# Patient Record
Sex: FEMALE | Race: WHITE | NOT HISPANIC OR LATINO | Employment: OTHER | URBAN - METROPOLITAN AREA
[De-identification: names, ages, dates, MRNs, and addresses within clinical notes are randomized per-mention and may not be internally consistent; named-entity substitution may affect disease eponyms.]

---

## 2017-01-05 ENCOUNTER — HISTORICAL (OUTPATIENT)
Dept: CARDIOLOGY | Facility: CLINIC | Age: 52
End: 2017-01-05

## 2017-02-04 ENCOUNTER — HISTORICAL (OUTPATIENT)
Dept: SLEEP MEDICINE | Facility: HOSPITAL | Age: 52
End: 2017-02-04

## 2017-02-20 ENCOUNTER — HISTORICAL (OUTPATIENT)
Dept: SURGERY | Facility: HOSPITAL | Age: 52
End: 2017-02-20

## 2017-03-15 ENCOUNTER — HISTORICAL (OUTPATIENT)
Dept: ADMINISTRATIVE | Facility: HOSPITAL | Age: 52
End: 2017-03-15

## 2017-03-20 ENCOUNTER — HISTORICAL (OUTPATIENT)
Dept: ADMINISTRATIVE | Facility: HOSPITAL | Age: 52
End: 2017-03-20

## 2017-03-27 ENCOUNTER — HOSPITAL ENCOUNTER (OUTPATIENT)
Dept: MEDSURG UNIT | Facility: HOSPITAL | Age: 52
End: 2017-03-28
Attending: OTOLARYNGOLOGY | Admitting: OTOLARYNGOLOGY

## 2017-04-26 ENCOUNTER — HISTORICAL (OUTPATIENT)
Dept: ADMINISTRATIVE | Facility: HOSPITAL | Age: 52
End: 2017-04-26

## 2017-05-31 ENCOUNTER — HISTORICAL (OUTPATIENT)
Dept: ADMINISTRATIVE | Facility: HOSPITAL | Age: 52
End: 2017-05-31

## 2017-06-16 ENCOUNTER — HISTORICAL (OUTPATIENT)
Dept: ADMINISTRATIVE | Facility: HOSPITAL | Age: 52
End: 2017-06-16

## 2017-06-16 LAB
BUN SERPL-MCNC: 15 MG/DL (ref 7–25)
CALCIUM SERPL-MCNC: 9.8 MG/DL (ref 8.4–10.3)
CHLORIDE SERPL-SCNC: 101 MMOL/L (ref 96–110)
CO2 SERPL-SCNC: 29 MMOL/L (ref 24–32)
CREAT SERPL-MCNC: 0.55 MG/DL (ref 0.7–1.1)
GLUCOSE SERPL-MCNC: 99 MG/DL (ref 65–99)
POTASSIUM SERPL-SCNC: 4.1 MMOL/L (ref 3.6–5.2)
SODIUM SERPL-SCNC: 140 MMOL/L (ref 135–146)
T4 FREE SERPL-MCNC: 0.8 NG/DL (ref 0.6–1.15)
TSH SERPL-ACNC: 5.4 MIU/L (ref 0.5–5)

## 2017-08-02 ENCOUNTER — HISTORICAL (OUTPATIENT)
Dept: ENDOSCOPY | Facility: HOSPITAL | Age: 52
End: 2017-08-02

## 2017-09-01 ENCOUNTER — HISTORICAL (OUTPATIENT)
Dept: INTERNAL MEDICINE | Facility: CLINIC | Age: 52
End: 2017-09-01

## 2017-09-01 LAB
T4 FREE SERPL-MCNC: 0.85 NG/DL (ref 0.76–1.46)
TSH SERPL-ACNC: 4.57 MIU/L (ref 0.36–3.74)

## 2017-11-01 ENCOUNTER — HISTORICAL (OUTPATIENT)
Dept: RADIOLOGY | Facility: HOSPITAL | Age: 52
End: 2017-11-01

## 2018-01-02 ENCOUNTER — HISTORICAL (OUTPATIENT)
Dept: INTERNAL MEDICINE | Facility: CLINIC | Age: 53
End: 2018-01-02

## 2018-01-02 LAB
T4 FREE SERPL-MCNC: 0.97 NG/DL (ref 0.76–1.46)
TSH SERPL-ACNC: 3.92 MIU/L (ref 0.36–3.74)

## 2018-03-14 ENCOUNTER — HISTORICAL (OUTPATIENT)
Dept: RADIOLOGY | Facility: HOSPITAL | Age: 53
End: 2018-03-14

## 2018-04-20 ENCOUNTER — HISTORICAL (OUTPATIENT)
Dept: ADMINISTRATIVE | Facility: HOSPITAL | Age: 53
End: 2018-04-20

## 2018-04-20 LAB
ALBUMIN SERPL-MCNC: 3.9 GM/DL (ref 3.4–5)
ALBUMIN/GLOB SERPL: 1 RATIO (ref 1–2)
ALP SERPL-CCNC: 86 UNIT/L (ref 45–117)
ALT SERPL-CCNC: 34 UNIT/L (ref 12–78)
AST SERPL-CCNC: 37 UNIT/L (ref 15–37)
BILIRUB SERPL-MCNC: 0.5 MG/DL (ref 0.2–1)
BILIRUBIN DIRECT+TOT PNL SERPL-MCNC: 0.2 MG/DL
BILIRUBIN DIRECT+TOT PNL SERPL-MCNC: 0.3 MG/DL
BUN SERPL-MCNC: 10 MG/DL (ref 7–18)
CALCIUM SERPL-MCNC: 9 MG/DL (ref 8.5–10.1)
CHLORIDE SERPL-SCNC: 105 MMOL/L (ref 98–107)
CHOLEST SERPL-MCNC: 322 MG/DL
CHOLEST/HDLC SERPL: 5 {RATIO} (ref 0–4.4)
CO2 SERPL-SCNC: 30 MMOL/L (ref 21–32)
CREAT SERPL-MCNC: 0.6 MG/DL (ref 0.6–1.3)
GLOBULIN SER-MCNC: 3.7 GM/ML (ref 2.3–3.5)
GLUCOSE SERPL-MCNC: 106 MG/DL (ref 74–106)
HDLC SERPL-MCNC: 65 MG/DL
LDLC SERPL CALC-MCNC: ABNORMAL MG/DL (ref 0–130)
POTASSIUM SERPL-SCNC: 4.6 MMOL/L (ref 3.5–5.1)
PROT SERPL-MCNC: 7.6 GM/DL (ref 6.4–8.2)
SODIUM SERPL-SCNC: 138 MMOL/L (ref 136–145)
TRIGL SERPL-MCNC: 640 MG/DL
VLDLC SERPL CALC-MCNC: ABNORMAL MG/DL

## 2018-05-08 ENCOUNTER — HISTORICAL (OUTPATIENT)
Dept: RADIOLOGY | Facility: HOSPITAL | Age: 53
End: 2018-05-08

## 2018-07-02 ENCOUNTER — HISTORICAL (OUTPATIENT)
Dept: ADMINISTRATIVE | Facility: HOSPITAL | Age: 53
End: 2018-07-02

## 2018-07-02 LAB
T4 FREE SERPL-MCNC: 0.83 NG/DL (ref 0.76–1.46)
TSH SERPL-ACNC: 4.88 MIU/L (ref 0.36–3.74)

## 2018-09-12 ENCOUNTER — HISTORICAL (OUTPATIENT)
Dept: RADIOLOGY | Facility: HOSPITAL | Age: 53
End: 2018-09-12

## 2018-12-07 ENCOUNTER — HISTORICAL (OUTPATIENT)
Dept: CARDIOLOGY | Facility: CLINIC | Age: 53
End: 2018-12-07

## 2018-12-07 LAB
ALBUMIN SERPL-MCNC: 3.5 GM/DL (ref 3.4–5)
ALBUMIN/GLOB SERPL: 1 RATIO (ref 1–2)
ALP SERPL-CCNC: 80 UNIT/L (ref 45–117)
ALT SERPL-CCNC: 37 UNIT/L (ref 12–78)
AST SERPL-CCNC: 30 UNIT/L (ref 15–37)
BILIRUB SERPL-MCNC: 0.3 MG/DL (ref 0.2–1)
BILIRUBIN DIRECT+TOT PNL SERPL-MCNC: 0.1 MG/DL
BILIRUBIN DIRECT+TOT PNL SERPL-MCNC: 0.2 MG/DL
BUN SERPL-MCNC: 10 MG/DL (ref 7–18)
CALCIUM SERPL-MCNC: 8.6 MG/DL (ref 8.5–10.1)
CHLORIDE SERPL-SCNC: 104 MMOL/L (ref 98–107)
CHOLEST SERPL-MCNC: 312 MG/DL
CHOLEST/HDLC SERPL: 3.9 {RATIO} (ref 0–4.4)
CO2 SERPL-SCNC: 30 MMOL/L (ref 21–32)
CREAT SERPL-MCNC: 0.8 MG/DL (ref 0.6–1.3)
GLOBULIN SER-MCNC: 3.9 GM/ML (ref 2.3–3.5)
GLUCOSE SERPL-MCNC: 99 MG/DL (ref 74–106)
HDLC SERPL-MCNC: 80 MG/DL
LDLC SERPL CALC-MCNC: ABNORMAL MG/DL (ref 0–130)
POTASSIUM SERPL-SCNC: 4.7 MMOL/L (ref 3.5–5.1)
PROT SERPL-MCNC: 7.4 GM/DL (ref 6.4–8.2)
SODIUM SERPL-SCNC: 141 MMOL/L (ref 136–145)
TRIGL SERPL-MCNC: 419 MG/DL
VLDLC SERPL CALC-MCNC: ABNORMAL MG/DL

## 2019-01-09 ENCOUNTER — HISTORICAL (OUTPATIENT)
Dept: ADMINISTRATIVE | Facility: HOSPITAL | Age: 54
End: 2019-01-09

## 2019-01-09 LAB
ABS NEUT (OLG): 5.23 X10(3)/MCL (ref 2.1–9.2)
ALBUMIN SERPL-MCNC: 3.9 GM/DL (ref 3.4–5)
ALBUMIN/GLOB SERPL: 1 RATIO (ref 1–2)
ALP SERPL-CCNC: 86 UNIT/L (ref 45–117)
ALT SERPL-CCNC: 32 UNIT/L (ref 12–78)
AST SERPL-CCNC: 25 UNIT/L (ref 15–37)
BASOPHILS # BLD AUTO: 0.06 X10(3)/MCL
BASOPHILS NFR BLD AUTO: 1 %
BILIRUB SERPL-MCNC: 0.3 MG/DL (ref 0.2–1)
BILIRUBIN DIRECT+TOT PNL SERPL-MCNC: <0.1 MG/DL
BILIRUBIN DIRECT+TOT PNL SERPL-MCNC: >0.2 MG/DL
BUN SERPL-MCNC: 9 MG/DL (ref 7–18)
CALCIUM SERPL-MCNC: 9.1 MG/DL (ref 8.5–10.1)
CHLORIDE SERPL-SCNC: 104 MMOL/L (ref 98–107)
CO2 SERPL-SCNC: 31 MMOL/L (ref 21–32)
CREAT SERPL-MCNC: 0.6 MG/DL (ref 0.6–1.3)
EOSINOPHIL # BLD AUTO: 0.23 X10(3)/MCL
EOSINOPHIL NFR BLD AUTO: 3 %
ERYTHROCYTE [DISTWIDTH] IN BLOOD BY AUTOMATED COUNT: 12.7 % (ref 11.5–14.5)
GLOBULIN SER-MCNC: 4.3 GM/ML (ref 2.3–3.5)
GLUCOSE SERPL-MCNC: 103 MG/DL (ref 74–106)
HCT VFR BLD AUTO: 42.9 % (ref 35–46)
HGB BLD-MCNC: 13.9 GM/DL (ref 12–16)
IMM GRANULOCYTES # BLD AUTO: 0.04 10*3/UL
IMM GRANULOCYTES NFR BLD AUTO: 0 %
LYMPHOCYTES # BLD AUTO: 2.49 X10(3)/MCL
LYMPHOCYTES NFR BLD AUTO: 29 % (ref 13–40)
MCH RBC QN AUTO: 31.7 PG (ref 26–34)
MCHC RBC AUTO-ENTMCNC: 32.4 GM/DL (ref 31–37)
MCV RBC AUTO: 97.9 FL (ref 80–100)
MONOCYTES # BLD AUTO: 0.55 X10(3)/MCL
MONOCYTES NFR BLD AUTO: 6 % (ref 4–12)
NEUTROPHILS # BLD AUTO: 5.23 X10(3)/MCL
NEUTROPHILS NFR BLD AUTO: 61 X10(3)/MCL
PLATELET # BLD AUTO: 272 X10(3)/MCL (ref 130–400)
PMV BLD AUTO: 10 FL (ref 7.4–10.4)
POTASSIUM SERPL-SCNC: 4.1 MMOL/L (ref 3.5–5.1)
PROT SERPL-MCNC: 8.2 GM/DL (ref 6.4–8.2)
RBC # BLD AUTO: 4.38 X10(6)/MCL (ref 4–5.2)
SODIUM SERPL-SCNC: 142 MMOL/L (ref 136–145)
WBC # SPEC AUTO: 8.6 X10(3)/MCL (ref 4.5–11)

## 2019-03-25 ENCOUNTER — HISTORICAL (OUTPATIENT)
Dept: RADIOLOGY | Facility: HOSPITAL | Age: 54
End: 2019-03-25

## 2019-05-23 ENCOUNTER — HISTORICAL (OUTPATIENT)
Dept: RADIOLOGY | Facility: HOSPITAL | Age: 54
End: 2019-05-23

## 2019-06-14 ENCOUNTER — HISTORICAL (OUTPATIENT)
Dept: RADIOLOGY | Facility: HOSPITAL | Age: 54
End: 2019-06-14

## 2019-11-21 ENCOUNTER — HISTORICAL (OUTPATIENT)
Dept: RADIOLOGY | Facility: HOSPITAL | Age: 54
End: 2019-11-21

## 2019-11-21 LAB
ALBUMIN SERPL-MCNC: 3.8 GM/DL (ref 3.4–5)
ALBUMIN/GLOB SERPL: 1.1 RATIO (ref 1.1–2)
ALP SERPL-CCNC: 71 UNIT/L (ref 45–117)
ALT SERPL-CCNC: 40 UNIT/L (ref 12–78)
AST SERPL-CCNC: 37 UNIT/L (ref 15–37)
BILIRUB SERPL-MCNC: 0.4 MG/DL (ref 0.2–1)
BILIRUBIN DIRECT+TOT PNL SERPL-MCNC: <0.1 MG/DL (ref 0–0.2)
BILIRUBIN DIRECT+TOT PNL SERPL-MCNC: ABNORMAL MG/DL
BUN SERPL-MCNC: 12 MG/DL (ref 7–18)
CALCIUM SERPL-MCNC: 9.2 MG/DL (ref 8.5–10.1)
CHLORIDE SERPL-SCNC: 109 MMOL/L (ref 98–107)
CHOLEST SERPL-MCNC: 305 MG/DL
CHOLEST/HDLC SERPL: 3.8 {RATIO} (ref 0–4.4)
CO2 SERPL-SCNC: 31 MMOL/L (ref 21–32)
CREAT SERPL-MCNC: 0.7 MG/DL (ref 0.6–1.3)
GLOBULIN SER-MCNC: 3.6 GM/ML (ref 2.3–3.5)
GLUCOSE SERPL-MCNC: 112 MG/DL (ref 74–106)
HDLC SERPL-MCNC: 80 MG/DL (ref 40–59)
LDLC SERPL CALC-MCNC: 164 MG/DL
POTASSIUM SERPL-SCNC: 5 MMOL/L (ref 3.5–5.1)
PROT SERPL-MCNC: 7.4 GM/DL (ref 6.4–8.2)
SODIUM SERPL-SCNC: 143 MMOL/L (ref 136–145)
TRIGL SERPL-MCNC: 306 MG/DL
VLDLC SERPL CALC-MCNC: 61 MG/DL

## 2020-01-15 ENCOUNTER — HISTORICAL (OUTPATIENT)
Dept: INTERNAL MEDICINE | Facility: CLINIC | Age: 55
End: 2020-01-15

## 2020-01-15 LAB
ABS NEUT (OLG): 4.04 X10(3)/MCL (ref 2.1–9.2)
ALBUMIN SERPL-MCNC: 3.4 GM/DL (ref 3.4–5)
ALBUMIN/GLOB SERPL: 0.9 RATIO (ref 1.1–2)
ALP SERPL-CCNC: 75 UNIT/L (ref 45–117)
ALT SERPL-CCNC: 46 UNIT/L (ref 12–78)
AST SERPL-CCNC: 38 UNIT/L (ref 15–37)
BASOPHILS # BLD AUTO: 0 X10(3)/MCL (ref 0–0.2)
BASOPHILS NFR BLD AUTO: 1 %
BILIRUB SERPL-MCNC: 0.3 MG/DL (ref 0.2–1)
BILIRUBIN DIRECT+TOT PNL SERPL-MCNC: <0.1 MG/DL (ref 0–0.2)
BILIRUBIN DIRECT+TOT PNL SERPL-MCNC: >0.2 MG/DL
BUN SERPL-MCNC: 7 MG/DL (ref 7–18)
CALCIUM SERPL-MCNC: 8.7 MG/DL (ref 8.5–10.1)
CHLORIDE SERPL-SCNC: 105 MMOL/L (ref 98–107)
CHOLEST SERPL-MCNC: 301 MG/DL
CHOLEST/HDLC SERPL: 3.4 {RATIO} (ref 0–4.4)
CO2 SERPL-SCNC: 30 MMOL/L (ref 21–32)
CREAT SERPL-MCNC: 0.6 MG/DL (ref 0.6–1.3)
EOSINOPHIL # BLD AUTO: 0.2 X10(3)/MCL (ref 0–0.9)
EOSINOPHIL NFR BLD AUTO: 3 %
ERYTHROCYTE [DISTWIDTH] IN BLOOD BY AUTOMATED COUNT: 13.3 % (ref 11.5–14.5)
GLOBULIN SER-MCNC: 3.7 GM/ML (ref 2.3–3.5)
GLUCOSE SERPL-MCNC: 101 MG/DL (ref 74–106)
HCT VFR BLD AUTO: 42.9 % (ref 35–46)
HDLC SERPL-MCNC: 89 MG/DL (ref 40–59)
HGB BLD-MCNC: 13.8 GM/DL (ref 12–16)
IMM GRANULOCYTES # BLD AUTO: 0.04 10*3/UL
IMM GRANULOCYTES NFR BLD AUTO: 1 %
LDLC SERPL CALC-MCNC: 135 MG/DL
LYMPHOCYTES # BLD AUTO: 2.2 X10(3)/MCL (ref 0.6–4.6)
LYMPHOCYTES NFR BLD AUTO: 32 %
MCH RBC QN AUTO: 32 PG (ref 26–34)
MCHC RBC AUTO-ENTMCNC: 32.2 GM/DL (ref 31–37)
MCV RBC AUTO: 99.5 FL (ref 80–100)
MONOCYTES # BLD AUTO: 0.5 X10(3)/MCL (ref 0.1–1.3)
MONOCYTES NFR BLD AUTO: 7 %
NEUTROPHILS # BLD AUTO: 4.04 X10(3)/MCL (ref 2.1–9.2)
NEUTROPHILS NFR BLD AUTO: 58 %
PLATELET # BLD AUTO: 253 X10(3)/MCL (ref 130–400)
PMV BLD AUTO: 9.9 FL (ref 7.4–10.4)
POTASSIUM SERPL-SCNC: 3.8 MMOL/L (ref 3.5–5.1)
PROT SERPL-MCNC: 7.1 GM/DL (ref 6.4–8.2)
RBC # BLD AUTO: 4.31 X10(6)/MCL (ref 4–5.2)
SODIUM SERPL-SCNC: 141 MMOL/L (ref 136–145)
T4 FREE SERPL-MCNC: 0.84 NG/DL (ref 0.76–1.46)
TRIGL SERPL-MCNC: 383 MG/DL
TSH SERPL-ACNC: 5.17 MIU/L (ref 0.36–3.74)
VLDLC SERPL CALC-MCNC: 77 MG/DL
WBC # SPEC AUTO: 7 X10(3)/MCL (ref 4.5–11)

## 2020-07-20 ENCOUNTER — HISTORICAL (OUTPATIENT)
Dept: RADIOLOGY | Facility: HOSPITAL | Age: 55
End: 2020-07-20

## 2020-07-20 LAB
ABS NEUT (OLG): 5.52 X10(3)/MCL (ref 2.1–9.2)
ALBUMIN SERPL-MCNC: 3.6 GM/DL (ref 3.4–5)
ALBUMIN/GLOB SERPL: 0.9 RATIO (ref 1.1–2)
ALP SERPL-CCNC: 97 UNIT/L (ref 45–117)
ALT SERPL-CCNC: 40 UNIT/L (ref 12–78)
AST SERPL-CCNC: 31 UNIT/L (ref 15–37)
BASOPHILS # BLD AUTO: 0 X10(3)/MCL (ref 0–0.2)
BASOPHILS NFR BLD AUTO: 0 %
BILIRUB SERPL-MCNC: 0.4 MG/DL (ref 0.2–1)
BILIRUBIN DIRECT+TOT PNL SERPL-MCNC: <0.1 MG/DL (ref 0–0.2)
BILIRUBIN DIRECT+TOT PNL SERPL-MCNC: ABNORMAL MG/DL
BUN SERPL-MCNC: 12 MG/DL (ref 7–18)
CALCIUM SERPL-MCNC: 8.7 MG/DL (ref 8.5–10.1)
CHLORIDE SERPL-SCNC: 107 MMOL/L (ref 98–107)
CHOLEST SERPL-MCNC: 323 MG/DL
CHOLEST/HDLC SERPL: 4 {RATIO} (ref 0–4.4)
CO2 SERPL-SCNC: 29 MMOL/L (ref 21–32)
CREAT SERPL-MCNC: 0.7 MG/DL (ref 0.6–1.3)
EOSINOPHIL # BLD AUTO: 0.3 X10(3)/MCL (ref 0–0.9)
EOSINOPHIL NFR BLD AUTO: 3 %
ERYTHROCYTE [DISTWIDTH] IN BLOOD BY AUTOMATED COUNT: 12.9 % (ref 11.5–14.5)
GLOBULIN SER-MCNC: 3.9 GM/ML (ref 2.3–3.5)
GLUCOSE SERPL-MCNC: 112 MG/DL (ref 74–106)
HCT VFR BLD AUTO: 42.9 % (ref 35–46)
HDLC SERPL-MCNC: 81 MG/DL (ref 40–59)
HGB BLD-MCNC: 14 GM/DL (ref 12–16)
IMM GRANULOCYTES # BLD AUTO: 0.05 10*3/UL
IMM GRANULOCYTES NFR BLD AUTO: 0 %
LDLC SERPL CALC-MCNC: ABNORMAL MG/DL
LYMPHOCYTES # BLD AUTO: 2.9 X10(3)/MCL (ref 0.6–4.6)
LYMPHOCYTES NFR BLD AUTO: 31 %
MCH RBC QN AUTO: 32.5 PG (ref 26–34)
MCHC RBC AUTO-ENTMCNC: 32.6 GM/DL (ref 31–37)
MCV RBC AUTO: 99.5 FL (ref 80–100)
MONOCYTES # BLD AUTO: 0.7 X10(3)/MCL (ref 0.1–1.3)
MONOCYTES NFR BLD AUTO: 7 %
NEUTROPHILS # BLD AUTO: 5.52 X10(3)/MCL (ref 2.1–9.2)
NEUTROPHILS NFR BLD AUTO: 58 %
PLATELET # BLD AUTO: 292 X10(3)/MCL (ref 130–400)
PMV BLD AUTO: 10.4 FL (ref 7.4–10.4)
POTASSIUM SERPL-SCNC: 3.7 MMOL/L (ref 3.5–5.1)
PROT SERPL-MCNC: 7.5 GM/DL (ref 6.4–8.2)
RBC # BLD AUTO: 4.31 X10(6)/MCL (ref 4–5.2)
SODIUM SERPL-SCNC: 141 MMOL/L (ref 136–145)
T4 FREE SERPL-MCNC: 1.01 NG/DL (ref 0.76–1.46)
TRIGL SERPL-MCNC: 421 MG/DL
TSH SERPL-ACNC: 9.18 MIU/L (ref 0.36–3.74)
VLDLC SERPL CALC-MCNC: ABNORMAL MG/DL
WBC # SPEC AUTO: 9.5 X10(3)/MCL (ref 4.5–11)

## 2020-09-23 ENCOUNTER — HISTORICAL (OUTPATIENT)
Dept: RADIOLOGY | Facility: HOSPITAL | Age: 55
End: 2020-09-23

## 2021-01-22 ENCOUNTER — HISTORICAL (OUTPATIENT)
Dept: ADMINISTRATIVE | Facility: HOSPITAL | Age: 56
End: 2021-01-22

## 2021-01-22 LAB
T3FREE SERPL-MCNC: 2.84 PG/ML (ref 1.71–3.71)
T4 FREE SERPL-MCNC: 0.76 NG/DL (ref 0.7–1.48)
TSH SERPL-ACNC: 3.6 UIU/ML (ref 0.35–4.94)

## 2021-03-10 ENCOUNTER — HISTORICAL (OUTPATIENT)
Dept: RADIOLOGY | Facility: HOSPITAL | Age: 56
End: 2021-03-10

## 2021-03-14 LAB — FINAL CULTURE: NORMAL

## 2021-03-18 ENCOUNTER — HISTORICAL (OUTPATIENT)
Dept: ADMINISTRATIVE | Facility: HOSPITAL | Age: 56
End: 2021-03-18

## 2021-03-18 LAB
ABS NEUT (OLG): 6.33 X10(3)/MCL (ref 2.1–9.2)
ALBUMIN SERPL-MCNC: 3.7 GM/DL (ref 3.5–5)
ALBUMIN/GLOB SERPL: 1.1 RATIO (ref 1.1–2)
ALP SERPL-CCNC: 85 UNIT/L (ref 40–150)
ALT SERPL-CCNC: 25 UNIT/L (ref 0–55)
AST SERPL-CCNC: 26 UNIT/L (ref 5–34)
BASOPHILS # BLD AUTO: 0 X10(3)/MCL (ref 0–0.2)
BASOPHILS NFR BLD AUTO: 0 %
BILIRUB SERPL-MCNC: 0.3 MG/DL
BILIRUBIN DIRECT+TOT PNL SERPL-MCNC: 0.1 MG/DL (ref 0–0.5)
BILIRUBIN DIRECT+TOT PNL SERPL-MCNC: 0.2 MG/DL (ref 0–0.8)
BUN SERPL-MCNC: 14.3 MG/DL (ref 9.8–20.1)
CALCIUM SERPL-MCNC: 9.3 MG/DL (ref 8.4–10.2)
CHLORIDE SERPL-SCNC: 106 MMOL/L (ref 98–107)
CO2 SERPL-SCNC: 28 MMOL/L (ref 22–29)
CREAT SERPL-MCNC: 0.71 MG/DL (ref 0.55–1.02)
CRP SERPL-MCNC: 1.02 MG/DL
EOSINOPHIL # BLD AUTO: 0.2 X10(3)/MCL (ref 0–0.9)
EOSINOPHIL NFR BLD AUTO: 2 %
ERYTHROCYTE [DISTWIDTH] IN BLOOD BY AUTOMATED COUNT: 13.1 % (ref 11.5–14.5)
GLOBULIN SER-MCNC: 3.4 GM/DL (ref 2.4–3.5)
GLUCOSE SERPL-MCNC: 125 MG/DL (ref 74–100)
HCT VFR BLD AUTO: 42.7 % (ref 35–46)
HGB BLD-MCNC: 14.1 GM/DL (ref 12–16)
IMM GRANULOCYTES # BLD AUTO: 0.04 10*3/UL
IMM GRANULOCYTES NFR BLD AUTO: 0 %
LIPASE SERPL-CCNC: 41 U/L
LYMPHOCYTES # BLD AUTO: 2.5 X10(3)/MCL (ref 0.6–4.6)
LYMPHOCYTES NFR BLD AUTO: 25 %
MCH RBC QN AUTO: 32.3 PG (ref 26–34)
MCHC RBC AUTO-ENTMCNC: 33 GM/DL (ref 31–37)
MCV RBC AUTO: 97.7 FL (ref 80–100)
MONOCYTES # BLD AUTO: 0.8 X10(3)/MCL (ref 0.1–1.3)
MONOCYTES NFR BLD AUTO: 8 %
NEUTROPHILS # BLD AUTO: 6.33 X10(3)/MCL (ref 2.1–9.2)
NEUTROPHILS NFR BLD AUTO: 65 %
PLATELET # BLD AUTO: 306 X10(3)/MCL (ref 130–400)
PMV BLD AUTO: 10.5 FL (ref 7.4–10.4)
POTASSIUM SERPL-SCNC: 3.9 MMOL/L (ref 3.5–5.1)
PROT SERPL-MCNC: 7.1 GM/DL (ref 6.4–8.3)
RBC # BLD AUTO: 4.37 X10(6)/MCL (ref 4–5.2)
SODIUM SERPL-SCNC: 143 MMOL/L (ref 136–145)
WBC # SPEC AUTO: 9.8 X10(3)/MCL (ref 4.5–11)

## 2021-04-07 ENCOUNTER — HISTORICAL (OUTPATIENT)
Dept: RADIOLOGY | Facility: HOSPITAL | Age: 56
End: 2021-04-07

## 2021-05-06 ENCOUNTER — HISTORICAL (OUTPATIENT)
Dept: RADIOLOGY | Facility: HOSPITAL | Age: 56
End: 2021-05-06

## 2021-10-26 ENCOUNTER — HISTORICAL (OUTPATIENT)
Dept: RADIOLOGY | Facility: HOSPITAL | Age: 56
End: 2021-10-26

## 2021-11-15 ENCOUNTER — HISTORICAL (OUTPATIENT)
Dept: GASTROENTEROLOGY | Facility: CLINIC | Age: 56
End: 2021-11-15

## 2021-11-15 LAB — SARS-COV-2 AG RESP QL IA.RAPID: NEGATIVE

## 2021-11-17 ENCOUNTER — HISTORICAL (OUTPATIENT)
Dept: ENDOSCOPY | Facility: HOSPITAL | Age: 56
End: 2021-11-17

## 2021-11-17 LAB — CRC RECOMMENDATION EXT: NORMAL

## 2022-02-04 ENCOUNTER — HISTORICAL (OUTPATIENT)
Dept: CARDIOLOGY | Facility: CLINIC | Age: 57
End: 2022-02-04

## 2022-02-04 LAB
ABS NEUT (OLG): 5.88 (ref 2.1–9.2)
ALBUMIN SERPL-MCNC: 3.7 G/DL (ref 3.5–5)
ALBUMIN/GLOB SERPL: 1.1 {RATIO} (ref 1.1–2)
ALP SERPL-CCNC: 80 U/L (ref 40–150)
ALT SERPL-CCNC: 37 U/L (ref 0–55)
APPEARANCE, UA: CLEAR
AST SERPL-CCNC: 39 U/L (ref 5–34)
BACTERIA SPEC CULT: NORMAL
BASOPHILS # BLD AUTO: 0 10*3/UL (ref 0–0.2)
BASOPHILS NFR BLD AUTO: 0 %
BILIRUB SERPL-MCNC: 0.4 MG/DL
BILIRUB UR QL STRIP: NEGATIVE
BILIRUBIN DIRECT+TOT PNL SERPL-MCNC: 0.1 (ref 0–0.5)
BILIRUBIN DIRECT+TOT PNL SERPL-MCNC: 0.3 (ref 0–0.8)
BUN SERPL-MCNC: 12.6 MG/DL (ref 9.8–20.1)
CALCIUM SERPL-MCNC: 9.7 MG/DL (ref 8.7–10.5)
CHLORIDE SERPL-SCNC: 103 MMOL/L (ref 98–107)
CHOLEST SERPL-MCNC: 346 MG/DL
CHOLEST/HDLC SERPL: 4 {RATIO} (ref 0–5)
CO2 SERPL-SCNC: 29 MMOL/L (ref 22–29)
COLOR UR: NORMAL
CREAT SERPL-MCNC: 0.67 MG/DL (ref 0.55–1.02)
EOSINOPHIL # BLD AUTO: 0.2 10*3/UL (ref 0–0.9)
EOSINOPHIL NFR BLD AUTO: 2 %
ERYTHROCYTE [DISTWIDTH] IN BLOOD BY AUTOMATED COUNT: 13.9 % (ref 11.5–14.5)
FLAG2 (OHS): 70
FLAG3 (OHS): 80
FLAGS (OHS): 70
GLOBULIN SER-MCNC: 3.5 G/DL (ref 2.4–3.5)
GLUCOSE (UA): NORMAL
GLUCOSE SERPL-MCNC: 114 MG/DL (ref 74–100)
HCT VFR BLD AUTO: 46.1 % (ref 35–46)
HDLC SERPL-MCNC: 96 MG/DL (ref 35–60)
HEMOLYSIS INTERF INDEX SERPL-ACNC: 12
HGB BLD-MCNC: 15 G/DL (ref 12–16)
HGB UR QL STRIP: NEGATIVE
HYALINE CASTS #/AREA URNS LPF: NORMAL /[LPF]
ICTERIC INTERF INDEX SERPL-ACNC: 0
IMM GRANULOCYTES # BLD AUTO: 0.06 10*3/UL
IMM GRANULOCYTES NFR BLD AUTO: 1 %
KETONES UR QL STRIP: NEGATIVE
LDLC SERPL CALC-MCNC: 174 MG/DL (ref 50–140)
LEUKOCYTE ESTERASE UR QL STRIP: NEGATIVE
LIPEMIC INTERF INDEX SERPL-ACNC: 33
LOW EVENT # SUSPECT FLAG (OHS): 70
LYMPHOCYTES # BLD AUTO: 2.8 10*3/UL (ref 0.6–4.6)
LYMPHOCYTES NFR BLD AUTO: 29 %
MANUAL DIFF? (OHS): NO
MCH RBC QN AUTO: 31.9 PG (ref 26–34)
MCHC RBC AUTO-ENTMCNC: 32.5 G/DL (ref 31–37)
MCV RBC AUTO: 98.1 FL (ref 80–100)
MO BLASTS SUSPECT FLAG (OHS): 30
MONOCYTES # BLD AUTO: 0.8 10*3/UL (ref 0.1–1.3)
MONOCYTES NFR BLD AUTO: 8 %
MUCOUS THREADS URNS QL MICRO: NORMAL
NEUTROPHILS # BLD AUTO: 5.88 10*3/UL (ref 2.1–9.2)
NEUTROPHILS NFR BLD AUTO: 60 %
NITRITE UR QL STRIP: NEGATIVE
NRBC BLD AUTO-RTO: 0 % (ref 0–0.2)
PH UR STRIP: 5.5 [PH] (ref 4.5–8)
PLATELET # BLD AUTO: 292 10*3/UL (ref 130–400)
PMV BLD AUTO: 10.2 FL (ref 7.4–10.4)
POTASSIUM SERPL-SCNC: 4.5 MMOL/L (ref 3.5–5.1)
PROT SERPL-MCNC: 7.2 G/DL (ref 6.4–8.3)
PROT UR QL STRIP: NEGATIVE
RBC # BLD AUTO: 4.7 10*6/UL (ref 4–5.2)
RBC #/AREA URNS HPF: NORMAL /[HPF] (ref 0–5)
SODIUM SERPL-SCNC: 143 MMOL/L (ref 136–145)
SP GR UR STRIP: 1.01 (ref 1–1.03)
SQUAMOUS EPITHELIAL, UA: NORMAL
TRIGL SERPL-MCNC: 382 MG/DL (ref 37–140)
UROBILINOGEN UR STRIP-ACNC: NORMAL
VLDLC SERPL CALC-MCNC: 76 MG/DL
WBC # SPEC AUTO: 9.7 10*3/UL (ref 4.5–11)
WBC #/AREA URNS HPF: NORMAL /[HPF] (ref 0–5)

## 2022-03-14 ENCOUNTER — HISTORICAL (OUTPATIENT)
Dept: CARDIOLOGY | Facility: HOSPITAL | Age: 57
End: 2022-03-14

## 2022-03-14 LAB
ALBUMIN SERPL-MCNC: 3.6 G/DL (ref 3.5–5)
ALBUMIN/GLOB SERPL: 1.1 {RATIO} (ref 1.1–2)
ALP SERPL-CCNC: 69 U/L (ref 40–150)
ALT SERPL-CCNC: 32 U/L (ref 0–55)
AST SERPL-CCNC: 32 U/L (ref 5–34)
BILIRUB SERPL-MCNC: 0.4 MG/DL
BILIRUBIN DIRECT+TOT PNL SERPL-MCNC: 0.1 (ref 0–0.5)
BILIRUBIN DIRECT+TOT PNL SERPL-MCNC: 0.3 (ref 0–0.8)
BUN SERPL-MCNC: 10.2 MG/DL (ref 9.8–20.1)
CALCIUM SERPL-MCNC: 9.4 MG/DL (ref 8.7–10.5)
CHLORIDE SERPL-SCNC: 104 MMOL/L (ref 98–107)
CHOLEST SERPL-MCNC: 316 MG/DL
CHOLEST/HDLC SERPL: 4 {RATIO} (ref 0–5)
CO2 SERPL-SCNC: 29 MMOL/L (ref 22–29)
CREAT SERPL-MCNC: 0.68 MG/DL (ref 0.55–1.02)
GLOBULIN SER-MCNC: 3.4 G/DL (ref 2.4–3.5)
GLUCOSE SERPL-MCNC: 104 MG/DL (ref 74–100)
HDLC SERPL-MCNC: 87 MG/DL (ref 35–60)
HEMOLYSIS INTERF INDEX SERPL-ACNC: 8
ICTERIC INTERF INDEX SERPL-ACNC: 0
LDLC SERPL CALC-MCNC: 174 MG/DL (ref 50–140)
LIPEMIC INTERF INDEX SERPL-ACNC: 10
POTASSIUM SERPL-SCNC: 3.8 MMOL/L (ref 3.5–5.1)
PROT SERPL-MCNC: 7 G/DL (ref 6.4–8.3)
SODIUM SERPL-SCNC: 142 MMOL/L (ref 136–145)
TRIGL SERPL-MCNC: 276 MG/DL (ref 37–140)
VLDLC SERPL CALC-MCNC: 55 MG/DL

## 2022-03-28 ENCOUNTER — HISTORICAL (OUTPATIENT)
Dept: RADIOLOGY | Facility: HOSPITAL | Age: 57
End: 2022-03-28

## 2022-04-11 ENCOUNTER — HISTORICAL (OUTPATIENT)
Dept: ADMINISTRATIVE | Facility: HOSPITAL | Age: 57
End: 2022-04-11
Payer: MEDICAID

## 2022-04-14 ENCOUNTER — HISTORICAL (OUTPATIENT)
Dept: RADIOLOGY | Facility: HOSPITAL | Age: 57
End: 2022-04-14
Payer: MEDICAID

## 2022-04-29 VITALS
OXYGEN SATURATION: 97 % | SYSTOLIC BLOOD PRESSURE: 132 MMHG | DIASTOLIC BLOOD PRESSURE: 83 MMHG | BODY MASS INDEX: 25.64 KG/M2 | HEIGHT: 67 IN | WEIGHT: 163.38 LBS

## 2022-04-30 NOTE — OP NOTE
DATE OF SURGERY:    08/02/2017    SURGEON:  Chidi Murray MD    ATTENDING PHYSICIAN:  Dr. Chidi Murray MD    RESIDENT SURGEON:  Dodie Jeffries MD.    PROCEDURE PERFORMED:  Colonoscopy with removal of polyps by hot biopsy and snare.    COMPLICATIONS:  None.    BLOOD LOSS:  Less than 10 cc.    FINDINGS:  Three cecal polyps with the largest measuring 1.5 cm.  Biopsies were obtained of the 2 smaller polyps, and the 1.5 cm polyp was removed in its entirety by snare; polyp at 43 cm in the descending colon removed by hot biopsy polypectomy; polyp at 30 cm in sigmoid colon, sessile in appearance, removed by hot biopsy polypectomy.    INDICATION FOR PROCEDURE:  The patient is a 52-year-old female who reports a history of colon polyps on a prior colonoscopy greater than 10 years ago.  She was referred today by the internal medicine clinic for colonoscopy.    PROCEDURE IN DETAIL:  Brief history was obtained in the preop holding area.  Risks and benefits of colonoscopy were discussed, and informed consent was obtained.  The patient was taken to the GI suite and placed in the left lateral decubitus position.  MAC anesthesia was induced by the anesthesia team.  A digital rectal exam was performed, which was within normal limits.  A well-lubricated colonoscope was then inserted into the patient's rectum and advanced under direct visualization to the cecum.  The cecum was identified visually and with anatomic landmarks.  A photograph was obtained of the appendiceal orifice.  Retracting the scope back, noted 3 polyps within the region of the cecum.  The first 2 polyps were less than 1 cm in size and were removed by hot forceps polypectomy.  The 3rd polyp was approximately 1.5 cm, and this was removed in its entirety by snare.  The scope was then reintroduced into the rectum and advanced again to the point of the 3 prior biopsy sites.  Photographs were obtained of all sites, showing them to be hemostatic.  The scope was then  retracted through the remainder of the colon, examining the colonic mucosa in its entirety for any abnormalities in color, texture, irregularity, nodules, polyps, or presence of other lesion.  Reaching 43 cm, we identified another pedunculated-appearing polyp which was also removed by hot biopsy polypectomy.  Continuing to retract through the colon, we encountered our last polyp at 30 cm.  This was a sessile-appearing lesion which was also removed by hot forceps polypectomy.  Reaching the rectum, the scope was retroflexed to allow better visualization of the rectal anal verge.  No pathology was noted in this region.  The scope was then removed from the patient's anus and the procedure terminated.  The patient was awakened from anesthesia and transferred to Recovery in stable condition.    RECOMMENDATIONS:    1. Repeat colonoscopy in 3 years.    2. The patient should return to the internal medicine clinic to review path results.  Further recommendations should be made at that time based on pathology results         ______________________________  Dodie Jeffires MD    ______________________________  Chidi Murray MD AM/KEVYN  DD:  08/02/2017  Time:  02:47PM  DT:  08/02/2017  Time:  03:23PM  Job #:  [id number]

## 2022-04-30 NOTE — PROGRESS NOTES
Patient:   Cheryl Bauer            MRN: [id number]            FIN: [id number]               Age:   55 years     Sex:  Female     :  1965   Associated Diagnoses:   None   Author:   Binh ANDERSON, Flora      Labs reviewed: Will discuss with patient during follow up appointment on  2020 at 8:40am.

## 2022-05-05 NOTE — HISTORICAL [facility] CERNER
This is a historical note converted from Taryn. Formatting and pictures may have been removed.  Please reference Taryn for original formatting and attached multimedia. Chief Complaint  Here for EGD and colonoscopy  History of Present Illness  57 y/o WF, pMHx HTN, HLD, thyroid disease,?muscular dystrophy, presents today for EGD and colonoscopy.? Patient was evaluated in GI clinic in March 2021?for epigastric abdominal pain and change in bowel habits.? Today, she reports?nearly constant epigastric abdominal?discomfort?unrelieved with omeprazole.? She is unable to identify any alleviating or exacerbating factors. ?She also reports?frequent changes in?bowel pattern?fluctuating from?diarrhea?5-6 times per day?followed by constipation for 2 to 3 days. ?She has?intermittent BRBPR?on toilet tissue and in the toilet bowl with last episode approximately 1 week ago.  ?   No fever, chills, nausea, vomiting, dysphagia, early satiety, melena, weight loss, fecal?incontinence, fecal urgency.  ?   Colonoscopy dated?8/2/2017 per Dr. Murray: Tubulovillous adenomatous polyp at the cecum with no evidence of malignancy,?tubular adenomatous polyp at 30 cm with no evidence of malignancy,?tubular adenomatous polyp at 43 cm with no evidence of malignancy.??Three cecal polyps?were noted with the largest measuring 1.5 cm. ?Biopsies were obtained of the 2 smaller polyps,?and the 1.5 cm polyp?was removed in its entirety?by snare. ?She was recommended repeat colonoscopy in 3 years.?  ?   She denies regular NSAID use or use of blood thinners. ?She denies tobacco use and drinks one beer daily. ?She denies a family history of IBD, colon polyps, or colon cancer.  Review of Systems  Comprehensive Review of Systems performed with no exceptions other than as noted in HPI.  Physical Exam  Vitals & Measurements  T:?37? ?C (Oral)? HR:?71(Monitored)? RR:?20? BP:?122/84? SpO2:?96%? WT:?73.4?kg? BMI:?25.4?  General:?well-developed well-nourished WF in  NAD  Eye: PERRLA, EOMI, clear conjunctiva  HENT:? oropharynx without erythema/exudate, oropharynx and nasal mucosal surfaces moist  Neck:? no thyromegaly or lymphadenopathy, trachea midline  Respiratory:?symmetrical chest expansion and respiratory effort, clear to auscultation bilaterally  Cardiovascular:?regular rate and rhythm without murmurs, gallops or rubs  Gastrointestinal:?soft, non-tender, non-distended, normoactive bowel sounds?without masses to palpation  Integumentary: no rashes or skin lesions present  Neurologic: cranial nerves intact, awake, alert, and oriented  Psych: good insight, appropriate mood, normal affect  Assessment/Plan  55 y/o WF, pMHx HTN, HLD, thyroid disease,?muscular dystrophy, presents today for EGD and colonoscopy.  ?   Risks, benefits, and alternatives of the procedure discussed.?  Will proceed with endoscopic procedure as scheduled.   Problem List/Past Medical History  Ongoing  Alcohol use  Bowens disease  Cardiac arrhythmia  Hypertension  Hypertriglyceridemia  LBBB (left bundle branch block)  Muscular dystrophy  Tibia fracture  Historical  No qualifying data  Procedure/Surgical History  Biopsy Gastrointestinal (08/02/2017)  Colonoscopy (08/02/2017)  Colonoscopy, flexible; with removal of tumor(s), polyp(s), or other lesion(s) by hot biopsy forceps (08/02/2017)  Colonoscopy, flexible; with removal of tumor(s), polyp(s), or other lesion(s) by snare technique (08/02/2017)  Excision of Cecum, Via Natural or Artificial Opening Endoscopic, Diagnostic (08/02/2017)  Excision of Descending Colon, Via Natural or Artificial Opening Endoscopic, Diagnostic (08/02/2017)  Excision of Sigmoid Colon, Via Natural or Artificial Opening Endoscopic, Diagnostic (08/02/2017)  Polypectomy (08/02/2017)  Resection of Right Thyroid Gland Lobe, Open Approach (03/27/2017)  Thyroidectomy (Right) (03/27/2017)  Total thyroid lobectomy, unilateral; with or without isthmusectomy (03/27/2017)  Application of short  leg splint (calf to foot) (03/03/2017)  Immobilization of Right Lower Leg using Splint (03/03/2017)  Drainage of Right Thyroid Gland Lobe, Percutaneous Approach, Diagnostic (02/20/2017)  Fine needle aspiration; with imaging guidance (02/20/2017)  Catheter placement in coronary artery(s) for coronary angiography, including intraprocedural injection(s) for coronary angiography, imaging supervision and interpretation; with right and left heart catheterization including intrapr. (02/11/2015)  Combined right and left heart cardiac catheterization (02/11/2015)  Coronary arteriography using a single catheter (02/11/2015)  Right/Left Heart Cath w/Angio including Injections (None) (02/11/2015)  claribel phaco  Bladder suspension  Calcium deposit on scalp removed  D&C x 4  Endometriosis sx  Hysterectomy  Knee surgery (right)  Nose surgery  Shoulder surgery (left)  Tonsillectomy and adenoidectomy; age 12 or over 30-DEC-2014 09:06:53<$>   Medications  Inpatient  buffered lidocaine 2% - 0.5 ml syringe, 10 mg= 0.5 mL, Subcutaneous, As Directed  IVF Lactated Ringers LR Infusion 1,000 mL, 1000 mL, IV  Home  lisinopril 5 mg oral tablet, 5 mg= 1 tab(s), Oral, Daily, 2 refills  mexiletine 200 mg oral capsule, 200 mg= 1 cap(s), Oral, q8hr  omega-3 polyunsaturated fatty acids 1200 mg oral capsule, 2400 mg= 2 cap(s), Oral, BID, 6 refills  omeprazole 40 mg oral DR capsule, See Instructions  paroxetine 10 mg oral tablet, See Instructions  Allergies  Dilantin-125?(LIGHT SENSIVITY, RASH)  Social History  Abuse/Neglect  No, 11/17/2021  Alcohol - High Risk, 02/10/2015  Current, Beer, Daily, 01/15/2020  Employment/School  Unemployed, 01/15/2020  Exercise  Exercise type: denies., 01/15/2020  Home/Environment  Lives with Spouse. Living situation: Home/Independent. Wheelchair, pt states she has to use a vest for her lungs, 01/15/2020  Nutrition/Health  Regular, Good, 01/15/2020  Sexual  Gender Identity Identifies as female.,  2019  Spiritual/Cultural  Quaker, 10/24/2019  Substance Use - Denies Substance Abuse, 02/10/2015  Never, 01/15/2020  Tobacco - Denies Tobacco Use, 02/10/2015  Former smoker, quit more than 30 days ago, N/A, 2021  Family History  CABG - Coronary artery bypass graft: Father.  CVA (cerebral infarction): Father.  : Father.  Diabetes mellitus type 2: Father.  Healthy adult: Mother.  Hypertension.: Father.  Immunizations  Vaccine Date Status Comments   zoster vaccine, inactivated 2021 Given    zoster vaccine, inactivated 2021 Given    tetanus/diphtheria/pertussis, acel(Tdap) 2018 Given    influenza virus vaccine, inactivated 10/02/2017 Given vaccine info sheets 2015       She reports chronic constant epigastric pain for the past year.? No inciting or relieving factors.? Eating does not change pain.? She has had alternating diarrhea and constipation in the past.? More recently she has been having more loose stools daily, anywhere to 2 to 5-6 episodes a day.? No rectal bleeding or melena. Weight is stable.? She is on omeprazole.? She had a colonoscopy with results as mentioned above.

## 2022-09-30 DIAGNOSIS — R06.02 SHORTNESS OF BREATH: Primary | ICD-10-CM

## 2022-10-11 DIAGNOSIS — Z87.891 PERSONAL HISTORY OF TOBACCO USE, PRESENTING HAZARDS TO HEALTH: Primary | ICD-10-CM

## 2022-10-18 ENCOUNTER — HOSPITAL ENCOUNTER (OUTPATIENT)
Dept: RADIOLOGY | Facility: HOSPITAL | Age: 57
Discharge: HOME | End: 2022-10-18
Attending: NURSE PRACTITIONER
Payer: MEDICAID

## 2022-10-18 ENCOUNTER — HOSPITAL ENCOUNTER (OUTPATIENT)
Dept: PULMONOLOGY | Facility: HOSPITAL | Age: 57
Discharge: HOME | End: 2022-10-18
Attending: INTERNAL MEDICINE
Payer: MEDICAID

## 2022-10-18 DIAGNOSIS — R06.02 SHORTNESS OF BREATH: ICD-10-CM

## 2022-10-18 DIAGNOSIS — Z87.891 PERSONAL HISTORY OF TOBACCO USE, PRESENTING HAZARDS TO HEALTH: ICD-10-CM

## 2022-10-18 LAB
DLCO SINGLE BREATH LLN: 19.46
DLCO SINGLE BREATH PRE REF: 60.5 %
DLCO SINGLE BREATH REF: 25.19
DLCOC SBVA LLN: 3.28
DLCOC SBVA REF: 4.63
DLCOC SINGLE BREATH LLN: 19.46
DLCOC SINGLE BREATH REF: 25.19
DLCOVA LLN: 3.28
DLCOVA PRE REF: 101.1 %
DLCOVA PRE: 4.68 ML/(MIN*MMHG*L) (ref 3.28–5.98)
DLCOVA REF: 4.63
ERV LLN: -16449.11
ERV PRE REF: 94.6 %
ERV REF: 0.89
FEF 25 75 CHG: 24.9 %
FEF 25 75 LLN: 1.36
FEF 25 75 POST REF: 72.6 %
FEF 25 75 PRE REF: 58.1 %
FEF 25 75 REF: 2.58
FET100 CHG: 5 %
FEV1 CHG: 12.2 %
FEV1 FVC CHG: 2.7 %
FEV1 FVC LLN: 68
FEV1 FVC POST REF: 101.6 %
FEV1 FVC PRE REF: 98.9 %
FEV1 FVC REF: 79
FEV1 LLN: 2.18
FEV1 POST REF: 62.2 %
FEV1 PRE REF: 55.4 %
FEV1 REF: 2.85
FRCPLETH LLN: 2.05
FRCPLETH PREREF: 89.7 %
FRCPLETH REF: 2.87
FVC CHG: 9.3 %
FVC LLN: 2.78
FVC POST REF: 60.8 %
FVC PRE REF: 55.6 %
FVC REF: 3.62
IVC PRE: 2.18 L (ref 2.78–4.51)
IVC SINGLE BREATH LLN: 2.78
IVC SINGLE BREATH PRE REF: 60.3 %
IVC SINGLE BREATH REF: 3.62
MVV LLN: 93
MVV PRE REF: 55 %
MVV REF: 109
PEF CHG: 24.5 %
PEF LLN: 5.09
PEF POST REF: 63.3 %
PEF PRE REF: 50.8 %
PEF REF: 6.97
POST FEF 25 75: 1.87 L/S (ref 1.36–4.18)
POST FET 100: 7.57 SEC
POST FEV1 FVC: 80.56 % (ref 67.71–89.19)
POST FEV1: 1.77 L (ref 2.18–3.5)
POST FVC: 2.2 L (ref 2.78–4.51)
POST PEF: 4.41 L/S (ref 5.09–8.84)
PRE DLCO: 15.24 ML/(MIN*MMHG) (ref 19.46–30.93)
PRE ERV: 0.84 L (ref -16449.11–16450.89)
PRE FEF 25 75: 1.5 L/S (ref 1.36–4.18)
PRE FET 100: 7.21 SEC
PRE FEV1 FVC: 78.48 % (ref 67.71–89.19)
PRE FEV1: 1.58 L (ref 2.18–3.5)
PRE FRC PL: 2.57 L (ref 2.05–3.69)
PRE FVC: 2.02 L (ref 2.78–4.51)
PRE MVV: 59.89 L/MIN (ref 92.55–125.22)
PRE PEF: 3.54 L/S (ref 5.09–8.84)
PRE RV: 1.73 L (ref 1.4–2.55)
PRE TLC: 3.91 L (ref 4.45–6.43)
RAW LLN: 3.06
RAW PRE REF: 77.8 %
RAW PRE: 2.38 CMH2O*S/L (ref 3.06–3.06)
RAW REF: 3.06
RV LLN: 1.4
RV PRE REF: 87.4 %
RV REF: 1.98
RVTLC LLN: 28
RVTLC PRE REF: 116.3 %
RVTLC PRE: 44.18 % (ref 28.41–47.59)
RVTLC REF: 38
TLC LLN: 4.45
TLC PRE REF: 71.9 %
TLC REF: 5.44
VA PRE: 3.26 L (ref 5.29–5.29)
VA SINGLE BREATH LLN: 5.29
VA SINGLE BREATH PRE REF: 61.5 %
VA SINGLE BREATH REF: 5.29
VC LLN: 2.78
VC PRE REF: 60.2 %
VC PRE: 2.18 L (ref 2.78–4.51)
VC REF: 3.62

## 2022-10-18 PROCEDURE — 71271 CT THORAX LUNG CANCER SCR C-: CPT | Mod: TC

## 2022-10-18 PROCEDURE — 94640 AIRWAY INHALATION TREATMENT: CPT | Mod: 59

## 2022-10-18 PROCEDURE — 94729 DIFFUSING CAPACITY: CPT

## 2022-10-18 PROCEDURE — 94726 PLETHYSMOGRAPHY LUNG VOLUMES: CPT

## 2022-10-18 PROCEDURE — 94060 EVALUATION OF WHEEZING: CPT

## 2022-10-18 RX ORDER — IPRATROPIUM BROMIDE 0.5 MG/2.5ML
0.5 SOLUTION RESPIRATORY (INHALATION)
Status: COMPLETED | OUTPATIENT
Start: 2022-10-18 | End: 2022-10-18

## 2022-10-18 RX ORDER — ALBUTEROL SULFATE 0.83 MG/ML
2.5 SOLUTION RESPIRATORY (INHALATION)
Status: COMPLETED | OUTPATIENT
Start: 2022-10-18 | End: 2022-10-18

## 2022-10-18 RX ADMIN — IPRATROPIUM BROMIDE 0.5 MG: 0.5 SOLUTION RESPIRATORY (INHALATION) at 09:10

## 2022-10-18 RX ADMIN — ALBUTEROL SULFATE 2.5 MG: 0.83 SOLUTION RESPIRATORY (INHALATION) at 09:10

## 2022-11-11 DIAGNOSIS — K21.9 GASTROESOPHAGEAL REFLUX DISEASE, UNSPECIFIED WHETHER ESOPHAGITIS PRESENT: Primary | ICD-10-CM

## 2022-11-14 RX ORDER — OMEPRAZOLE 40 MG/1
40 CAPSULE, DELAYED RELEASE ORAL DAILY
Qty: 30 CAPSULE | Refills: 5 | Status: SHIPPED | OUTPATIENT
Start: 2022-11-14 | End: 2023-05-10

## 2022-11-21 ENCOUNTER — OFFICE VISIT (OUTPATIENT)
Dept: FAMILY MEDICINE | Facility: CLINIC | Age: 57
End: 2022-11-21
Payer: MEDICAID

## 2022-11-21 VITALS
BODY MASS INDEX: 26.2 KG/M2 | SYSTOLIC BLOOD PRESSURE: 118 MMHG | HEART RATE: 88 BPM | RESPIRATION RATE: 18 BRPM | WEIGHT: 163 LBS | TEMPERATURE: 98 F | OXYGEN SATURATION: 96 % | HEIGHT: 66 IN | DIASTOLIC BLOOD PRESSURE: 82 MMHG

## 2022-11-21 DIAGNOSIS — G71.11 MYOTONIC DYSTROPHY: ICD-10-CM

## 2022-11-21 DIAGNOSIS — Z12.31 VISIT FOR SCREENING MAMMOGRAM: ICD-10-CM

## 2022-11-21 DIAGNOSIS — E78.5 HYPERLIPIDEMIA, UNSPECIFIED HYPERLIPIDEMIA TYPE: ICD-10-CM

## 2022-11-21 DIAGNOSIS — I44.7 LEFT BUNDLE BRANCH BLOCK: ICD-10-CM

## 2022-11-21 DIAGNOSIS — E04.2 MULTINODULAR GOITER: ICD-10-CM

## 2022-11-21 DIAGNOSIS — Z23 IMMUNIZATION DUE: Primary | ICD-10-CM

## 2022-11-21 PROCEDURE — 99204 PR OFFICE/OUTPT VISIT, NEW, LEVL IV, 45-59 MIN: ICD-10-PCS | Mod: S$PBB,,, | Performed by: FAMILY MEDICINE

## 2022-11-21 PROCEDURE — 90471 IMMUNIZATION ADMIN: CPT | Mod: PBBFAC

## 2022-11-21 PROCEDURE — 3008F PR BODY MASS INDEX (BMI) DOCUMENTED: ICD-10-PCS | Mod: CPTII,,, | Performed by: FAMILY MEDICINE

## 2022-11-21 PROCEDURE — 4010F ACE/ARB THERAPY RXD/TAKEN: CPT | Mod: CPTII,,, | Performed by: FAMILY MEDICINE

## 2022-11-21 PROCEDURE — 99214 OFFICE O/P EST MOD 30 MIN: CPT | Mod: PBBFAC | Performed by: FAMILY MEDICINE

## 2022-11-21 PROCEDURE — 3079F PR MOST RECENT DIASTOLIC BLOOD PRESSURE 80-89 MM HG: ICD-10-PCS | Mod: CPTII,,, | Performed by: FAMILY MEDICINE

## 2022-11-21 PROCEDURE — 3008F BODY MASS INDEX DOCD: CPT | Mod: CPTII,,, | Performed by: FAMILY MEDICINE

## 2022-11-21 PROCEDURE — 3079F DIAST BP 80-89 MM HG: CPT | Mod: CPTII,,, | Performed by: FAMILY MEDICINE

## 2022-11-21 PROCEDURE — 4010F PR ACE/ARB THEARPY RXD/TAKEN: ICD-10-PCS | Mod: CPTII,,, | Performed by: FAMILY MEDICINE

## 2022-11-21 PROCEDURE — 99204 OFFICE O/P NEW MOD 45 MIN: CPT | Mod: S$PBB,,, | Performed by: FAMILY MEDICINE

## 2022-11-21 PROCEDURE — 3074F SYST BP LT 130 MM HG: CPT | Mod: CPTII,,, | Performed by: FAMILY MEDICINE

## 2022-11-21 PROCEDURE — 3074F PR MOST RECENT SYSTOLIC BLOOD PRESSURE < 130 MM HG: ICD-10-PCS | Mod: CPTII,,, | Performed by: FAMILY MEDICINE

## 2022-11-21 PROCEDURE — 1159F MED LIST DOCD IN RCRD: CPT | Mod: CPTII,,, | Performed by: FAMILY MEDICINE

## 2022-11-21 PROCEDURE — 1159F PR MEDICATION LIST DOCUMENTED IN MEDICAL RECORD: ICD-10-PCS | Mod: CPTII,,, | Performed by: FAMILY MEDICINE

## 2022-11-21 RX ORDER — MEXILETINE HYDROCHLORIDE 200 MG/1
200 CAPSULE ORAL EVERY 8 HOURS
COMMUNITY
Start: 2022-11-10

## 2022-11-21 RX ORDER — LISINOPRIL 5 MG/1
5 TABLET ORAL DAILY
COMMUNITY
Start: 2022-08-13 | End: 2022-11-21 | Stop reason: SDUPTHER

## 2022-11-21 RX ORDER — PAROXETINE 10 MG/1
10 TABLET, FILM COATED ORAL DAILY
COMMUNITY
Start: 2022-10-13 | End: 2022-11-21 | Stop reason: SDUPTHER

## 2022-11-21 RX ORDER — UMECLIDINIUM BROMIDE AND VILANTEROL TRIFENATATE 62.5; 25 UG/1; UG/1
1 POWDER RESPIRATORY (INHALATION) DAILY
COMMUNITY
Start: 2022-11-19

## 2022-11-21 RX ORDER — LISINOPRIL 5 MG/1
5 TABLET ORAL DAILY
Qty: 90 TABLET | Refills: 3 | Status: SHIPPED | OUTPATIENT
Start: 2022-11-21 | End: 2023-09-21 | Stop reason: SDUPTHER

## 2022-11-21 RX ORDER — TRAMADOL HYDROCHLORIDE 50 MG/1
50 TABLET ORAL EVERY 6 HOURS PRN
COMMUNITY
Start: 2022-10-27

## 2022-11-21 RX ORDER — ALBUTEROL SULFATE 90 UG/1
1 AEROSOL, METERED RESPIRATORY (INHALATION) EVERY 4 HOURS PRN
COMMUNITY
Start: 2022-10-19

## 2022-11-21 RX ORDER — PAROXETINE 10 MG/1
10 TABLET, FILM COATED ORAL DAILY
Qty: 90 TABLET | Refills: 3 | Status: SHIPPED | OUTPATIENT
Start: 2022-11-21 | End: 2023-01-17 | Stop reason: SDUPTHER

## 2022-11-21 NOTE — PROGRESS NOTES
Cheryl Bauer  11/21/2022  [id number]                  Chief Complaint:  Chief Complaint   Patient presents with    Establish Care    Medication Refill       History of Present Illness:  57-year-old  female with PMH of myotonic dystrophy, multinodular goiter, left bundle branch block, hypertension, hyperlipidemia presents to clinic  Needs refills  Unsure of when next cardiology appt is  Sees pulmonary in [place] and neurology in [place]      History:  Past Medical History:   Diagnosis Date    Hypertension      History reviewed. No pertinent surgical history.  History reviewed. No pertinent family history.  Social History     Socioeconomic History    Marital status:    Tobacco Use    Smoking status: Former     Types: Cigarettes    Smokeless tobacco: Never   Substance and Sexual Activity    Alcohol use: Yes    Drug use: Never    Sexual activity: Yes     Patient Active Problem List   Diagnosis    Shortness of breath     Review of patient's allergies indicates:   Allergen Reactions    Dilantin [phenytoin sodium extended] Hives         Medications:  Current Outpatient Medications on File Prior to Visit   Medication Sig Dispense Refill    albuterol (PROVENTIL/VENTOLIN HFA) 90 mcg/actuation inhaler Inhale 1 puff into the lungs every 4 (four) hours as needed.      ANORO ELLIPTA 62.5-25 mcg/actuation DsDv Inhale 1 puff into the lungs once daily.      mexiletine (MEXITIL) 200 MG Cap Take 200 mg by mouth every 8 (eight) hours.      omeprazole (PRILOSEC) 40 MG capsule Take 1 capsule (40 mg total) by mouth once daily. 30 capsule 5    traMADoL (ULTRAM) 50 mg tablet Take 50 mg by mouth every 6 (six) hours as needed.      [DISCONTINUED] lisinopriL (PRINIVIL,ZESTRIL) 5 MG tablet Take 5 mg by mouth once daily.      [DISCONTINUED] paroxetine (PAXIL) 10 MG tablet Take 10 mg by mouth once daily.       No current facility-administered medications on file prior to visit.       Medications have been reviewed and  reconciled with patient at this visit.    Adverse reactions to current medications reviewed with patient..      Exam:  Wt Readings from Last 3 Encounters:   11/21/22 73.9 kg (163 lb)   10/08/21 74.1 kg (163 lb 5.8 oz)     Temp Readings from Last 3 Encounters:   11/21/22 97.7 °F (36.5 °C) (Oral)     BP Readings from Last 3 Encounters:   11/21/22 118/82   10/08/21 132/83     Pulse Readings from Last 3 Encounters:   11/21/22 88     Body mass index is 26.31 kg/m².      ROS:  See HPI for details        All Other ROS: Negative except as stated in HPI.    PE:  General: Alert and oriented, No acute distress. In wheelchair  Head: Normocephalic, Atraumatic.  Eye: Pupils are equal, round and reactive to light, Extraocular movements are intact, Sclera non-icteric.  Ears/Nose/Throat: Normal, Mucosa moist, Left TM obscured by cerumen  Neck/Thyroid: Supple, Non-tender, No carotid bruit, No palpable thyromegaly or thyroid nodule, No lymphadenopathy, No JVD, Full range of motion.  Respiratory: Clear to auscultation bilaterally; No wheezes, rales or rhonchi,Non-labored respirations, Symmetrical chest wall expansion.  Cardiovascular: Regular rate and rhythm, S1/S2 normal, No murmurs, rubs or gallops.  Gastrointestinal: Soft, Non-tender, Non-distended, Normal bowel sounds, No palpable organomegaly.  Musculoskeletal: Normal range of motion.  Integumentary: Warm, Dry, Intact, No suspicious lesions or rashes.  Extremities: No clubbing, cyanosis, left foot in ace bandage  Psychiatric: Normal interaction, Coherent speech, Euthymic mood, Appropriate affect    Laboratory Reviewed ({Yes)  Lab Results   Component Value Date    WBC 9.7 02/04/2022    HGB 15.0 02/04/2022    HCT 46.1 02/04/2022     02/04/2022    CHOL 316 03/14/2022    TRIG 276 03/14/2022    HDL 87 03/14/2022    ALT 32 03/14/2022    AST 32 03/14/2022     03/14/2022    K 3.8 03/14/2022    CREATININE 0.68 03/14/2022    BUN 10.2 03/14/2022    CO2 29 03/14/2022    TSH  3.5969 01/22/2021       Cheryl was seen today for establish care and medication refill.    Diagnoses and all orders for this visit:    Immunization due  -     Influenza - Quadrivalent *Preferred* (6 months+) (PF)    Visit for screening mammogram  -     Mammo Digital Screening Bilat w/ Vladimir; Future    Myotonic dystrophy  -     TSH; Future  -     T4, Free; Future  -     CBC Auto Differential; Future  -     Comprehensive Metabolic Panel; Future  -     Hemoglobin A1C; Future  -     Lipid Panel; Future  -     TSH  -     T4, Free  -     CBC Auto Differential  -     Comprehensive Metabolic Panel  -     Hemoglobin A1C  -     Lipid Panel    Multinodular goiter  -     TSH; Future  -     T4, Free; Future  -     CBC Auto Differential; Future  -     Comprehensive Metabolic Panel; Future  -     Hemoglobin A1C; Future  -     Lipid Panel; Future  -     TSH  -     T4, Free  -     CBC Auto Differential  -     Comprehensive Metabolic Panel  -     Hemoglobin A1C  -     Lipid Panel    Left bundle branch block  -     TSH; Future  -     T4, Free; Future  -     CBC Auto Differential; Future  -     Comprehensive Metabolic Panel; Future  -     Hemoglobin A1C; Future  -     Lipid Panel; Future  -     TSH  -     T4, Free  -     CBC Auto Differential  -     Comprehensive Metabolic Panel  -     Hemoglobin A1C  -     Lipid Panel    Hyperlipidemia, unspecified hyperlipidemia type  -     TSH; Future  -     T4, Free; Future  -     CBC Auto Differential; Future  -     Comprehensive Metabolic Panel; Future  -     Hemoglobin A1C; Future  -     Lipid Panel; Future  -     TSH  -     T4, Free  -     CBC Auto Differential  -     Comprehensive Metabolic Panel  -     Hemoglobin A1C  -     Lipid Panel    Other orders  -     lisinopriL (PRINIVIL,ZESTRIL) 5 MG tablet; Take 1 tablet (5 mg total) by mouth once daily.  -     paroxetine (PAXIL) 10 MG tablet; Take 1 tablet (10 mg total) by mouth once daily.              Care Plan/Goals: Reviewed    Goals    None          Follow up: No follow-ups on file.

## 2022-11-28 ENCOUNTER — PATIENT OUTREACH (OUTPATIENT)
Dept: GASTROENTEROLOGY | Facility: CLINIC | Age: 57
End: 2022-11-28
Payer: MEDICAID

## 2022-11-30 ENCOUNTER — HOSPITAL ENCOUNTER (OUTPATIENT)
Dept: RADIOLOGY | Facility: HOSPITAL | Age: 57
Discharge: HOME | End: 2022-11-30
Attending: FAMILY MEDICINE
Payer: MEDICAID

## 2022-11-30 DIAGNOSIS — Z12.31 VISIT FOR SCREENING MAMMOGRAM: ICD-10-CM

## 2022-11-30 PROCEDURE — 77067 SCR MAMMO BI INCL CAD: CPT | Mod: TC

## 2022-11-30 PROCEDURE — 77063 BREAST TOMOSYNTHESIS BI: CPT | Mod: 26,,, | Performed by: STUDENT IN AN ORGANIZED HEALTH CARE EDUCATION/TRAINING PROGRAM

## 2022-11-30 PROCEDURE — 77067 MAMMO DIGITAL SCREENING BILAT WITH TOMO: ICD-10-PCS | Mod: 26,,, | Performed by: STUDENT IN AN ORGANIZED HEALTH CARE EDUCATION/TRAINING PROGRAM

## 2022-11-30 PROCEDURE — 77063 MAMMO DIGITAL SCREENING BILAT WITH TOMO: ICD-10-PCS | Mod: 26,,, | Performed by: STUDENT IN AN ORGANIZED HEALTH CARE EDUCATION/TRAINING PROGRAM

## 2022-11-30 PROCEDURE — 77067 SCR MAMMO BI INCL CAD: CPT | Mod: 26,,, | Performed by: STUDENT IN AN ORGANIZED HEALTH CARE EDUCATION/TRAINING PROGRAM

## 2022-12-05 ENCOUNTER — OFFICE VISIT (OUTPATIENT)
Dept: CARDIOLOGY | Facility: CLINIC | Age: 57
End: 2022-12-05
Payer: MEDICAID

## 2022-12-05 VITALS
SYSTOLIC BLOOD PRESSURE: 103 MMHG | RESPIRATION RATE: 18 BRPM | HEART RATE: 85 BPM | TEMPERATURE: 99 F | HEIGHT: 67 IN | OXYGEN SATURATION: 96 % | WEIGHT: 158.19 LBS | BODY MASS INDEX: 24.83 KG/M2 | DIASTOLIC BLOOD PRESSURE: 73 MMHG

## 2022-12-05 DIAGNOSIS — I10 HTN (HYPERTENSION), BENIGN: ICD-10-CM

## 2022-12-05 DIAGNOSIS — E78.2 MIXED HYPERLIPIDEMIA: ICD-10-CM

## 2022-12-05 DIAGNOSIS — G71.11 MYOTONIC DYSTROPHY: ICD-10-CM

## 2022-12-05 DIAGNOSIS — I44.7 LEFT BUNDLE BRANCH BLOCK: Primary | ICD-10-CM

## 2022-12-05 PROCEDURE — 1159F MED LIST DOCD IN RCRD: CPT | Mod: CPTII,,, | Performed by: NURSE PRACTITIONER

## 2022-12-05 PROCEDURE — 4010F ACE/ARB THERAPY RXD/TAKEN: CPT | Mod: CPTII,,, | Performed by: NURSE PRACTITIONER

## 2022-12-05 PROCEDURE — 3078F PR MOST RECENT DIASTOLIC BLOOD PRESSURE < 80 MM HG: ICD-10-PCS | Mod: CPTII,,, | Performed by: NURSE PRACTITIONER

## 2022-12-05 PROCEDURE — 1159F PR MEDICATION LIST DOCUMENTED IN MEDICAL RECORD: ICD-10-PCS | Mod: CPTII,,, | Performed by: NURSE PRACTITIONER

## 2022-12-05 PROCEDURE — 99214 OFFICE O/P EST MOD 30 MIN: CPT | Mod: PBBFAC | Performed by: NURSE PRACTITIONER

## 2022-12-05 PROCEDURE — 3074F SYST BP LT 130 MM HG: CPT | Mod: CPTII,,, | Performed by: NURSE PRACTITIONER

## 2022-12-05 PROCEDURE — 99214 PR OFFICE/OUTPT VISIT, EST, LEVL IV, 30-39 MIN: ICD-10-PCS | Mod: S$PBB,,, | Performed by: NURSE PRACTITIONER

## 2022-12-05 PROCEDURE — 3078F DIAST BP <80 MM HG: CPT | Mod: CPTII,,, | Performed by: NURSE PRACTITIONER

## 2022-12-05 PROCEDURE — 93005 ELECTROCARDIOGRAM TRACING: CPT

## 2022-12-05 PROCEDURE — 4010F PR ACE/ARB THEARPY RXD/TAKEN: ICD-10-PCS | Mod: CPTII,,, | Performed by: NURSE PRACTITIONER

## 2022-12-05 PROCEDURE — 99214 OFFICE O/P EST MOD 30 MIN: CPT | Mod: S$PBB,,, | Performed by: NURSE PRACTITIONER

## 2022-12-05 PROCEDURE — 1160F PR REVIEW ALL MEDS BY PRESCRIBER/CLIN PHARMACIST DOCUMENTED: ICD-10-PCS | Mod: CPTII,,, | Performed by: NURSE PRACTITIONER

## 2022-12-05 PROCEDURE — 3008F BODY MASS INDEX DOCD: CPT | Mod: CPTII,,, | Performed by: NURSE PRACTITIONER

## 2022-12-05 PROCEDURE — 3074F PR MOST RECENT SYSTOLIC BLOOD PRESSURE < 130 MM HG: ICD-10-PCS | Mod: CPTII,,, | Performed by: NURSE PRACTITIONER

## 2022-12-05 PROCEDURE — 3008F PR BODY MASS INDEX (BMI) DOCUMENTED: ICD-10-PCS | Mod: CPTII,,, | Performed by: NURSE PRACTITIONER

## 2022-12-05 PROCEDURE — 1160F RVW MEDS BY RX/DR IN RCRD: CPT | Mod: CPTII,,, | Performed by: NURSE PRACTITIONER

## 2022-12-05 NOTE — PATIENT INSTRUCTIONS
EKG  Echocardiogram within one month per neurologist recommendations  Lexiscan in March 2023  Follow up in cardiology clinic in 6 months or sooner if needed   Follow up with PCP as directed

## 2022-12-05 NOTE — PROGRESS NOTES
CHIEF COMPLAINT:   Chief Complaint   Patient presents with    Follow-up     Patient here for 6 mth f/u w ECHO results. Pt c/o dyspnea on exertion or at rest. Pt denies cp, palpitations, dizziness, and swelling. Questions.                    Review of patient's allergies indicates:   Allergen Reactions    Dilantin [phenytoin sodium extended] Hives                                          HPI:  Cheryl Bauer 57 y.o. female  female with PMH of myotonic dystrophy, multinodular goiter, left bundle branch block, hypertension, hyperlipidemia, and colon polyps who presents to cardiology clinic for routine follow-up. Patient is followed in cardiology due to her history of myotonic dystrophy for further evaluation of possible cardiac complications.  Latest echocardiogram completed October 2021 revealed an ejection fraction of 59% (see report below).  Latest ischemic evaluation includes a normal myocardial perfusion study with no evidence of ischemia or scar on 3.28.22.  During her last clinic visit, the patient endorsed a syncopal episode.  She had a subsequent 30 day event monitor that she wore for approximately 2 weeks (April 2022) that was essentially benign. It revealed the patient was in Sinus rhythm with average HR 84 bpm, minimum 60 bpm, and max 137 bpm.  There were no significant pauses, VT, SVT or AFib.  4. Reported episodes the patient was noted to be in sinus.    Today the patient endorses stable shortness of breath with exertion that she attributes to her COPD.  Of note, the patient reports that she sees a pulmonologist who prescribed inhalers which has improved her symptoms.  She also endorses that she wears home oxygen at 2 L nasal cannula at night.  She denies any chest pain, orthopnea, PND, peripheral edema, palpitations, lightheadedness, dizziness or syncope.  She remains compliant with current medications.  She continues to follow-up with her neurologist and myotonic dystrophy physician in  [place] who recommends EKG and Echocardiogram every 6 months, along with an annual stress test.        Previous diagnostic testing  ECHO -EF- 59%, PASP is estimated to be normal, no significant valvular abnormalities, in comparison to previous echocardiogram performed on 10/26/2021 there were no significant changes. (4.14.22)  Echocardiogram 10/2021 EF 50-55%, mildly elevated PASP  Echocardiogram from 3/10/2021 with EF 50-55%  30-day event monitor from 2020 with no significant arrhythmias.  IDALIA testing in 2019 which revealed no evidence of significant arterial insufficiency.   Stress Echocardiogram in May 2019 which was negative for inducible ischemia.     Denies smoking. Quit 12 years prior. Used to smoke 1 pack/day x 20 years.  Drinks 3 natural light beers per day  Denies drug use                                                                                                                                                                                                                                                                                                                                                                                                                                                                                               Patient Active Problem List   Diagnosis    Shortness of breath     Past Surgical History:   Procedure Laterality Date    addenectomy      CARPAL TUNNEL RELEASE Right     HYSTERECTOMY      THYROIDECTOMY Right     TONSILLECTOMY      TUBAL LIGATION       Social History     Socioeconomic History    Marital status:    Tobacco Use    Smoking status: Former     Types: Cigarettes     Quit date: 2009     Years since quittin.9    Smokeless tobacco: Never   Substance and Sexual Activity    Alcohol use: Yes     Alcohol/week: 1.0 standard drink     Types: 1 Cans of beer per week     Comment: daily    Drug use: Never    Sexual activity: Yes       "  Family History   Problem Relation Age of Onset    Hypertension Father     Heart failure Father     Heart disease Father     Heart attack Father          Current Outpatient Medications:     albuterol (PROVENTIL/VENTOLIN HFA) 90 mcg/actuation inhaler, Inhale 1 puff into the lungs every 4 (four) hours as needed., Disp: , Rfl:     ANORO ELLIPTA 62.5-25 mcg/actuation DsDv, Inhale 1 puff into the lungs once daily., Disp: , Rfl:     lisinopriL (PRINIVIL,ZESTRIL) 5 MG tablet, Take 1 tablet (5 mg total) by mouth once daily., Disp: 90 tablet, Rfl: 3    mexiletine (MEXITIL) 200 MG Cap, Take 200 mg by mouth every 8 (eight) hours., Disp: , Rfl:     omeprazole (PRILOSEC) 40 MG capsule, Take 1 capsule (40 mg total) by mouth once daily., Disp: 30 capsule, Rfl: 5    paroxetine (PAXIL) 10 MG tablet, Take 1 tablet (10 mg total) by mouth once daily., Disp: 90 tablet, Rfl: 3    traMADoL (ULTRAM) 50 mg tablet, Take 50 mg by mouth every 6 (six) hours as needed., Disp: , Rfl:      ROS:                                                                                                                                                                             Review of Systems   Constitutional: Negative.    HENT: Negative.     Eyes: Negative.    Respiratory: Negative.  Negative for shortness of breath.    Cardiovascular: Negative.    Gastrointestinal: Negative.    Genitourinary: Negative.    Musculoskeletal: Negative.    Skin: Negative.    Neurological: Negative.    Endo/Heme/Allergies: Negative.    Psychiatric/Behavioral: Negative.        Blood pressure 103/73, pulse 85, temperature 98.5 °F (36.9 °C), temperature source Oral, resp. rate 18, height 5' 7" (1.702 m), weight 71.8 kg (158 lb 3.2 oz), SpO2 96 %.   PE:  Physical Exam  HENT:      Head: Normocephalic.      Nose: Nose normal.   Eyes:      Pupils: Pupils are equal, round, and reactive to light.   Cardiovascular:      Rate and Rhythm: Normal rate and regular rhythm.   Pulmonary:      " Effort: Pulmonary effort is normal.   Abdominal:      General: Abdomen is flat. Bowel sounds are normal.      Palpations: Abdomen is soft.   Musculoskeletal:         General: Normal range of motion.      Cervical back: Normal range of motion.   Skin:     General: Skin is warm.   Neurological:      General: No focal deficit present.      Mental Status: She is alert.   Psychiatric:         Mood and Affect: Mood normal.              ASSESSMENT/PLAN:  Left bundle branch block in the setting of muscular dystrophy  - Denies CP  - normal myocardial perfusion study with no evidence of ischemia or scar (3.28.22)  - Lexiscan stress test- negative for ischemia ( 5.6.21)  - Echocardiogram with EF 50-55% and mildly elevated PASP (10.21)  - EKG today- Normal sinus rhythm, left bundle branch block, rate -79, QTc -448  - 30-day event monitor from December 2020 with no significant arrhythmias.  - Patient neurologist recommends EKG/Echo every 6 months and stress test annually  - EKG -  LBBB with QRS 150ms, no changes compared to prior  - Repeat Echo to be done within one month and lexiscan in March 2023    COPD  - Reports stable DOVE that improves with inhalers.  - reports wear oxygen 2 L nasal cannula at night   - continue management per P pulmonology    Hypertension  - at goal today  - Continue lisinopril 5 mg daily  - Counseled on low salt diet     Hyperlipidemia  - Statin and zetia contraindicated due to muscular dystrophy  - can consider [id number]  - Continue omega-3    Muscular dystrophy  - Continue management per neurologist      EKG  Echocardiogram within one month per neurologist recommendations  Lexiscan in March 2023  Follow up in cardiology clinic in 6 months or sooner if needed   Follow up with PCP as directed

## 2023-01-17 NOTE — TELEPHONE ENCOUNTER
----- Message from Shereen Cortes sent at 1/17/2023 10:12 AM CST -----  Regarding: Refill  Provider: Dr Alejandra Javier  Preferred Pharmacy: [facility]  Last Visit:  Next Visit: 3/21/2023  Patient's Contact Number:    1. Name of Medication: Paroxetine  Dosage: 10 mg tab  Comments:    2. Name of Medication:  Dosage:  Comments:    3. Name of Medication:  Dosage:  Comments    4. Name of Medication:  Dosage:  Comments:    5. Name of Medication:  Dosage:  Comments:

## 2023-01-20 RX ORDER — PAROXETINE 10 MG/1
10 TABLET, FILM COATED ORAL DAILY
Qty: 60 TABLET | Refills: 0 | Status: SHIPPED | OUTPATIENT
Start: 2023-01-20 | End: 2023-09-21 | Stop reason: SDUPTHER

## 2023-03-14 ENCOUNTER — HOSPITAL ENCOUNTER (OUTPATIENT)
Dept: RADIOLOGY | Facility: HOSPITAL | Age: 58
Discharge: HOME | End: 2023-03-14
Attending: NURSE PRACTITIONER
Payer: MEDICAID

## 2023-03-14 ENCOUNTER — HOSPITAL ENCOUNTER (OUTPATIENT)
Dept: CARDIOLOGY | Facility: HOSPITAL | Age: 58
Discharge: HOME | End: 2023-03-14
Attending: NURSE PRACTITIONER
Payer: MEDICAID

## 2023-03-14 VITALS
HEART RATE: 69 BPM | BODY MASS INDEX: 24.85 KG/M2 | WEIGHT: 158.31 LBS | DIASTOLIC BLOOD PRESSURE: 79 MMHG | HEIGHT: 67 IN | RESPIRATION RATE: 20 BRPM | SYSTOLIC BLOOD PRESSURE: 140 MMHG

## 2023-03-14 DIAGNOSIS — G71.11 MYOTONIC DYSTROPHY: ICD-10-CM

## 2023-03-14 DIAGNOSIS — I44.7 LEFT BUNDLE BRANCH BLOCK: ICD-10-CM

## 2023-03-14 LAB
CV STRESS BASE HR: 69 BPM
DIASTOLIC BLOOD PRESSURE: 79 MMHG
NUC REST EJECTION FRACTION: 76
NUC STRESS EJECTION FRACTION: 75 %
OHS CV CPX 85 PERCENT MAX PREDICTED HEART RATE MALE: 132
OHS CV CPX ESTIMATED METS: 1
OHS CV CPX MAX PREDICTED HEART RATE: 156
OHS CV CPX PATIENT IS FEMALE: 1
OHS CV CPX PATIENT IS MALE: 0
OHS CV CPX PEAK DIASTOLIC BLOOD PRESSURE: 79 MMHG
OHS CV CPX PEAK HEAR RATE: 91 BPM
OHS CV CPX PEAK RATE PRESSURE PRODUCT: NORMAL
OHS CV CPX PEAK SYSTOLIC BLOOD PRESSURE: 140 MMHG
OHS CV CPX PERCENT MAX PREDICTED HEART RATE ACHIEVED: 58
OHS CV CPX RATE PRESSURE PRODUCT PRESENTING: 9660
STRESS ECHO POST EXERCISE DUR MIN: 0 MINUTES
STRESS ECHO POST EXERCISE DUR SEC: 59 SECONDS
SYSTOLIC BLOOD PRESSURE: 140 MMHG

## 2023-03-14 PROCEDURE — A9502 TC99M TETROFOSMIN: HCPCS

## 2023-03-14 PROCEDURE — 63600175 PHARM REV CODE 636 W HCPCS

## 2023-03-14 PROCEDURE — 78452 HT MUSCLE IMAGE SPECT MULT: CPT

## 2023-03-14 RX ORDER — REGADENOSON 0.08 MG/ML
INJECTION, SOLUTION INTRAVENOUS
Status: COMPLETED
Start: 2023-03-14 | End: 2023-03-14

## 2023-03-14 RX ADMIN — REGADENOSON 0.4 MG: 0.08 INJECTION, SOLUTION INTRAVENOUS at 07:03

## 2023-03-14 NOTE — NURSING NOTE
Patient came in for a nuclear walking stress test, but is physically incapable of walking because she is in a wheelchair and has a Dx of Muscular Dystrophy. Test was changed to Lexiscan at this time.

## 2023-03-21 ENCOUNTER — OFFICE VISIT (OUTPATIENT)
Dept: FAMILY MEDICINE | Facility: CLINIC | Age: 58
End: 2023-03-21
Payer: MEDICAID

## 2023-03-21 VITALS
HEART RATE: 83 BPM | HEIGHT: 67 IN | OXYGEN SATURATION: 96 % | BODY MASS INDEX: 25.43 KG/M2 | DIASTOLIC BLOOD PRESSURE: 72 MMHG | RESPIRATION RATE: 18 BRPM | WEIGHT: 162 LBS | SYSTOLIC BLOOD PRESSURE: 104 MMHG | TEMPERATURE: 98 F

## 2023-03-21 DIAGNOSIS — I10 HTN (HYPERTENSION), BENIGN: Primary | ICD-10-CM

## 2023-03-21 DIAGNOSIS — E78.5 HYPERLIPIDEMIA, UNSPECIFIED HYPERLIPIDEMIA TYPE: ICD-10-CM

## 2023-03-21 DIAGNOSIS — G71.11 MYOTONIC DYSTROPHY: ICD-10-CM

## 2023-03-21 PROCEDURE — 4010F PR ACE/ARB THEARPY RXD/TAKEN: ICD-10-PCS | Mod: CPTII,,, | Performed by: FAMILY MEDICINE

## 2023-03-21 PROCEDURE — 1159F PR MEDICATION LIST DOCUMENTED IN MEDICAL RECORD: ICD-10-PCS | Mod: CPTII,,, | Performed by: FAMILY MEDICINE

## 2023-03-21 PROCEDURE — 1159F MED LIST DOCD IN RCRD: CPT | Mod: CPTII,,, | Performed by: FAMILY MEDICINE

## 2023-03-21 PROCEDURE — 3078F DIAST BP <80 MM HG: CPT | Mod: CPTII,,, | Performed by: FAMILY MEDICINE

## 2023-03-21 PROCEDURE — 99214 OFFICE O/P EST MOD 30 MIN: CPT | Mod: PBBFAC | Performed by: FAMILY MEDICINE

## 2023-03-21 PROCEDURE — 3074F PR MOST RECENT SYSTOLIC BLOOD PRESSURE < 130 MM HG: ICD-10-PCS | Mod: CPTII,,, | Performed by: FAMILY MEDICINE

## 2023-03-21 PROCEDURE — 99213 OFFICE O/P EST LOW 20 MIN: CPT | Mod: S$PBB,,, | Performed by: FAMILY MEDICINE

## 2023-03-21 PROCEDURE — 3074F SYST BP LT 130 MM HG: CPT | Mod: CPTII,,, | Performed by: FAMILY MEDICINE

## 2023-03-21 PROCEDURE — 3008F BODY MASS INDEX DOCD: CPT | Mod: CPTII,,, | Performed by: FAMILY MEDICINE

## 2023-03-21 PROCEDURE — 4010F ACE/ARB THERAPY RXD/TAKEN: CPT | Mod: CPTII,,, | Performed by: FAMILY MEDICINE

## 2023-03-21 PROCEDURE — 3008F PR BODY MASS INDEX (BMI) DOCUMENTED: ICD-10-PCS | Mod: CPTII,,, | Performed by: FAMILY MEDICINE

## 2023-03-21 PROCEDURE — 3078F PR MOST RECENT DIASTOLIC BLOOD PRESSURE < 80 MM HG: ICD-10-PCS | Mod: CPTII,,, | Performed by: FAMILY MEDICINE

## 2023-03-21 PROCEDURE — 99213 PR OFFICE/OUTPT VISIT, EST, LEVL III, 20-29 MIN: ICD-10-PCS | Mod: S$PBB,,, | Performed by: FAMILY MEDICINE

## 2023-03-21 NOTE — PROGRESS NOTES
"Patient Name: Cheryl Bauer   : 1965  MRN: [id number]     SUBJECTIVE:  Cheryl Bauer is a 58 y.o. female here for Follow-up  .    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  Feels well, no complaints.  Follows with cardiology. Saw them in Dec and had stress test a week ago. Normal.  Sees pulmonary in [place] and neurology in [place]. Neurology recommends EKG and Echocardiogram every 6 months, along with an annual stress test.  Bp well controlled on lisinopril 5 mg. GERD well controlled on omeprazole 40 mg qd.  Patient on paxil for hot flashes. It does help.  Colonoscopy - repeat in 3 years.   Mammogram normal.      ALLERGIES:   Review of patient's allergies indicates:   Allergen Reactions    Dilantin [phenytoin sodium extended] Hives         ROS:  Review of Systems   Constitutional:  Negative for chills, fever and weight loss.   HENT:  Negative for congestion, ear pain and sore throat.    Eyes:  Negative for blurred vision.   Respiratory:  Negative for cough, shortness of breath and wheezing.    Cardiovascular:  Negative for chest pain, palpitations, leg swelling and PND.   Gastrointestinal:  Negative for abdominal pain, nausea and vomiting.   Genitourinary:  Negative for dysuria and hematuria.   Skin:  Negative for rash.   Neurological:  Positive for weakness. Negative for dizziness and headaches.   Psychiatric/Behavioral:  Negative for depression and substance abuse. The patient is not nervous/anxious.        OBJECTIVE:  Vital signs  Vitals:    23 0821   BP: 104/72   Pulse: 83   Resp: 18   Temp: 98 °F (36.7 °C)   TempSrc: Oral   SpO2: 96%   Weight: 73.5 kg (162 lb)   Height: 5' 7" (1.702 m)      Body mass index is 25.37 kg/m².    PHYSICAL EXAM:   Physical Exam  Vitals reviewed.   Constitutional:       General: She is not in acute distress.     Appearance: " Normal appearance. She is not ill-appearing.   HENT:      Head: Normocephalic and atraumatic.      Right Ear: External ear normal.      Left Ear: External ear normal.      Nose: Nose normal. No rhinorrhea.      Mouth/Throat:      Mouth: Mucous membranes are moist.   Eyes:      General: No scleral icterus.        Right eye: No discharge.         Left eye: No discharge.      Conjunctiva/sclera: Conjunctivae normal.      Pupils: Pupils are equal, round, and reactive to light.   Cardiovascular:      Rate and Rhythm: Normal rate and regular rhythm.      Heart sounds: No murmur heard.  Pulmonary:      Effort: Pulmonary effort is normal. No respiratory distress.      Breath sounds: No wheezing, rhonchi or rales.   Abdominal:      General: Bowel sounds are normal. There is no distension.      Palpations: Abdomen is soft.      Tenderness: There is no abdominal tenderness.   Musculoskeletal:      Cervical back: Normal range of motion and neck supple. No rigidity or tenderness.      Right lower leg: No edema.      Left lower leg: No edema.      Comments: Sitting in a wheelchair   Skin:     General: Skin is warm.      Coloration: Skin is not pale.      Findings: No rash.   Neurological:      Mental Status: She is alert and oriented to person, place, and time. Mental status is at baseline.   Psychiatric:         Mood and Affect: Mood normal.         Behavior: Behavior normal.        ASSESSMENT/PLAN:  Cheryl was seen today for follow-up.    Diagnoses and all orders for this visit:    HTN (hypertension), benign  -     CBC Auto Differential; Future  -     Comprehensive Metabolic Panel; Future  -     Hemoglobin A1C; Future  -     TSH; Future    Hyperlipidemia, unspecified hyperlipidemia type  -     Lipid Panel; Future    Myotonic dystrophy       Compliant with medications.  Stable.  Continue same.  Check labs.  Continue to follow his specialists.  Updated with preventive care.  Declining hepatitis-C screening.        Previous  medical history/lab work/radiology reviewed and considered during medical management decisions.   Medication list reviewed and medication reconciliation performed.  Patient was provided  and care about his/her current diagnosis (es) and medications including risk/benefit and side effects/adverse events, over the counter medication uses/doses, home self-care and contact precautions,  and red flags and indications for when to seek immediate medical attention.   Patient was advised to continue compliance with current medication list and medical recommendations.  Recommended/ Advised continued compliance with recommended eating habits/ diets for medical conditions and exercise 150 minutes/ week (if possible) for medical condition (s).        RESULTS:  Recent Results (from the past 1008 hour(s))   Nuclear Stress - Cardiology Interpreted    Collection Time: 03/14/23  8:34 AM   Result Value Ref Range    85% Max Predicted      Max Predicted      [facility] CV CPX PATIENT IS MALE 0.0     [facility] CV CPX PATIENT IS FEMALE 1.0     Systolic blood pressure 140 mmHg    Diastolic blood pressure 79 mmHg    HR at rest 69 bpm    Exercise duration (min) 0 minutes    Exercise duration (sec) 59 seconds    Peak Systolic  mmHg    Peak Diatolic BP 79 mmHg    Peak HR 91 bpm    Estimated METs 1     % Max HR Achieved 58     RPP 9,660     Peak RPP 12,740     Nuc Rest EF 76     Nuc Stress EF 75 %         Follow Up:   In Six-month    [unfilled]    This note was created with the assistance of a voice recognition software or phone dictation. There may be transcription errors as a result of using this technology however minimal. Effort has been made to assure accuracy of transcription but any obvious errors or omissions should be clarified with the author of the document

## 2023-03-22 PROBLEM — E78.5 HYPERLIPIDEMIA: Status: ACTIVE | Noted: 2022-12-05

## 2023-05-04 DIAGNOSIS — G71.11 MYOTONIC DYSTROPHY: Primary | ICD-10-CM

## 2023-05-06 DIAGNOSIS — K21.9 GASTROESOPHAGEAL REFLUX DISEASE, UNSPECIFIED WHETHER ESOPHAGITIS PRESENT: ICD-10-CM

## 2023-05-10 RX ORDER — OMEPRAZOLE 40 MG/1
CAPSULE, DELAYED RELEASE ORAL
Qty: 30 CAPSULE | Refills: 0 | Status: SHIPPED | OUTPATIENT
Start: 2023-05-10 | End: 2023-06-07

## 2023-06-06 DIAGNOSIS — K21.9 GASTROESOPHAGEAL REFLUX DISEASE, UNSPECIFIED WHETHER ESOPHAGITIS PRESENT: ICD-10-CM

## 2023-06-07 RX ORDER — OMEPRAZOLE 40 MG/1
CAPSULE, DELAYED RELEASE ORAL
Qty: 30 CAPSULE | Refills: 0 | Status: SHIPPED | OUTPATIENT
Start: 2023-06-07 | End: 2023-07-13 | Stop reason: SDUPTHER

## 2023-06-07 NOTE — TELEPHONE ENCOUNTER
Please see the attached refill request. Please let me know if pt needs clinic visit, last seen 3/18/2021.

## 2023-06-14 ENCOUNTER — HOSPITAL ENCOUNTER (OUTPATIENT)
Dept: CARDIOLOGY | Facility: HOSPITAL | Age: 58
Discharge: HOME | End: 2023-06-14
Attending: INTERNAL MEDICINE
Payer: MEDICAID

## 2023-06-14 VITALS
DIASTOLIC BLOOD PRESSURE: 70 MMHG | SYSTOLIC BLOOD PRESSURE: 119 MMHG | WEIGHT: 162 LBS | HEIGHT: 67 IN | BODY MASS INDEX: 25.43 KG/M2

## 2023-06-14 DIAGNOSIS — G71.11 MYOTONIC DYSTROPHY: ICD-10-CM

## 2023-06-14 LAB
AV INDEX (PROSTH): 0.56
AV MEAN GRADIENT: 3 MMHG
AV PEAK GRADIENT: 6 MMHG
AV VALVE AREA: 2.54 CM2
AV VELOCITY RATIO: 0.81
BSA FOR ECHO PROCEDURE: 1.86 M2
CV ECHO LV RWT: 0.46 CM
DOP CALC AO PEAK VEL: 1.23 M/S
DOP CALC AO VTI: 22.8 CM
DOP CALC LVOT AREA: 4.5 CM2
DOP CALC LVOT DIAMETER: 2.4 CM
DOP CALC LVOT PEAK VEL: 1 M/S
DOP CALC LVOT STROKE VOLUME: 57.88 CM3
DOP CALC MV VTI: 16 CM
DOP CALCLVOT PEAK VEL VTI: 12.8 CM
E WAVE DECELERATION TIME: 131.8 MSEC
E/A RATIO: 1.14
E/E' RATIO: 9.85 M/S
ECHO LV POSTERIOR WALL: 1.14 CM (ref 0.6–1.1)
EJECTION FRACTION: 60 %
FRACTIONAL SHORTENING: 29 % (ref 28–44)
INTERVENTRICULAR SEPTUM: 1.39 CM (ref 0.6–1.1)
LEFT ATRIUM SIZE: 3.05 CM
LEFT INTERNAL DIMENSION IN SYSTOLE: 3.53 CM (ref 2.1–4)
LEFT VENTRICLE DIASTOLIC VOLUME INDEX: 63.65 ML/M2
LEFT VENTRICLE DIASTOLIC VOLUME: 117.76 ML
LEFT VENTRICLE MASS INDEX: 136 G/M2
LEFT VENTRICLE SYSTOLIC VOLUME INDEX: 28 ML/M2
LEFT VENTRICLE SYSTOLIC VOLUME: 51.82 ML
LEFT VENTRICULAR INTERNAL DIMENSION IN DIASTOLE: 4.99 CM (ref 3.5–6)
LEFT VENTRICULAR MASS: 251.04 G
LV LATERAL E/E' RATIO: 8 M/S
LV SEPTAL E/E' RATIO: 12.8 M/S
LVOT MG: 2.15 MMHG
LVOT MV: 0.67 CM/S
MV MEAN GRADIENT: 1 MMHG
MV PEAK A VEL: 0.56 M/S
MV PEAK E VEL: 0.64 M/S
MV PEAK GRADIENT: 3 MMHG
MV STENOSIS PRESSURE HALF TIME: 38.22 MS
MV VALVE AREA BY CONTINUITY EQUATION: 3.62 CM2
MV VALVE AREA P 1/2 METHOD: 5.76 CM2
OHS LV EJECTION FRACTION SIMPSONS BIPLANE MOD: 6 %
PISA TR MAX VEL: 1.85 M/S
RA PRESSURE: 3 MMHG
RIGHT VENTRICULAR END-DIASTOLIC DIMENSION: 2.63 CM
TDI LATERAL: 0.08 M/S
TDI SEPTAL: 0.05 M/S
TDI: 0.07 M/S
TR MAX PG: 14 MMHG
TV REST PULMONARY ARTERY PRESSURE: 17 MMHG

## 2023-06-14 PROCEDURE — 93306 TTE W/DOPPLER COMPLETE: CPT

## 2023-07-13 ENCOUNTER — OFFICE VISIT (OUTPATIENT)
Dept: GASTROENTEROLOGY | Facility: CLINIC | Age: 58
End: 2023-07-13
Payer: MEDICAID

## 2023-07-13 VITALS
DIASTOLIC BLOOD PRESSURE: 75 MMHG | OXYGEN SATURATION: 95 % | SYSTOLIC BLOOD PRESSURE: 109 MMHG | WEIGHT: 163.38 LBS | HEIGHT: 67 IN | BODY MASS INDEX: 25.64 KG/M2 | RESPIRATION RATE: 16 BRPM | HEART RATE: 94 BPM | TEMPERATURE: 98 F

## 2023-07-13 DIAGNOSIS — R19.8 IRREGULAR BOWEL HABITS: ICD-10-CM

## 2023-07-13 DIAGNOSIS — R10.13 EPIGASTRIC ABDOMINAL PAIN: Primary | ICD-10-CM

## 2023-07-13 DIAGNOSIS — R68.81 EARLY SATIETY: ICD-10-CM

## 2023-07-13 DIAGNOSIS — Z86.010 PERSONAL HISTORY OF COLONIC POLYPS: ICD-10-CM

## 2023-07-13 PROBLEM — Z86.0100 PERSONAL HISTORY OF COLONIC POLYPS: Status: ACTIVE | Noted: 2023-07-13

## 2023-07-13 PROCEDURE — 3008F PR BODY MASS INDEX (BMI) DOCUMENTED: ICD-10-PCS | Mod: CPTII,,, | Performed by: NURSE PRACTITIONER

## 2023-07-13 PROCEDURE — 99214 PR OFFICE/OUTPT VISIT, EST, LEVL IV, 30-39 MIN: ICD-10-PCS | Mod: S$PBB,,, | Performed by: NURSE PRACTITIONER

## 2023-07-13 PROCEDURE — 99214 OFFICE O/P EST MOD 30 MIN: CPT | Mod: PBBFAC | Performed by: NURSE PRACTITIONER

## 2023-07-13 PROCEDURE — 3074F PR MOST RECENT SYSTOLIC BLOOD PRESSURE < 130 MM HG: ICD-10-PCS | Mod: CPTII,,, | Performed by: NURSE PRACTITIONER

## 2023-07-13 PROCEDURE — 4010F ACE/ARB THERAPY RXD/TAKEN: CPT | Mod: CPTII,,, | Performed by: NURSE PRACTITIONER

## 2023-07-13 PROCEDURE — 3078F DIAST BP <80 MM HG: CPT | Mod: CPTII,,, | Performed by: NURSE PRACTITIONER

## 2023-07-13 PROCEDURE — 3074F SYST BP LT 130 MM HG: CPT | Mod: CPTII,,, | Performed by: NURSE PRACTITIONER

## 2023-07-13 PROCEDURE — 3008F BODY MASS INDEX DOCD: CPT | Mod: CPTII,,, | Performed by: NURSE PRACTITIONER

## 2023-07-13 PROCEDURE — 99214 OFFICE O/P EST MOD 30 MIN: CPT | Mod: S$PBB,,, | Performed by: NURSE PRACTITIONER

## 2023-07-13 PROCEDURE — 4010F PR ACE/ARB THEARPY RXD/TAKEN: ICD-10-PCS | Mod: CPTII,,, | Performed by: NURSE PRACTITIONER

## 2023-07-13 PROCEDURE — 3078F PR MOST RECENT DIASTOLIC BLOOD PRESSURE < 80 MM HG: ICD-10-PCS | Mod: CPTII,,, | Performed by: NURSE PRACTITIONER

## 2023-07-13 PROCEDURE — 1160F PR REVIEW ALL MEDS BY PRESCRIBER/CLIN PHARMACIST DOCUMENTED: ICD-10-PCS | Mod: CPTII,,, | Performed by: NURSE PRACTITIONER

## 2023-07-13 PROCEDURE — 1160F RVW MEDS BY RX/DR IN RCRD: CPT | Mod: CPTII,,, | Performed by: NURSE PRACTITIONER

## 2023-07-13 PROCEDURE — 1159F MED LIST DOCD IN RCRD: CPT | Mod: CPTII,,, | Performed by: NURSE PRACTITIONER

## 2023-07-13 PROCEDURE — 1159F PR MEDICATION LIST DOCUMENTED IN MEDICAL RECORD: ICD-10-PCS | Mod: CPTII,,, | Performed by: NURSE PRACTITIONER

## 2023-07-13 RX ORDER — DICYCLOMINE HYDROCHLORIDE 10 MG/1
10 CAPSULE ORAL
Qty: 120 CAPSULE | Refills: 0 | Status: SHIPPED | OUTPATIENT
Start: 2023-07-13 | End: 2023-08-01

## 2023-07-13 RX ORDER — OMEPRAZOLE 40 MG/1
40 CAPSULE, DELAYED RELEASE ORAL DAILY
Qty: 30 CAPSULE | Refills: 5 | Status: SHIPPED | OUTPATIENT
Start: 2023-07-13 | End: 2024-01-11

## 2023-07-13 NOTE — ASSESSMENT & PLAN NOTE
Colonoscopy revealed hemorrhoids found on perianal exam, examined portion of ileum normal, multiple fragments of adenomatous polyps x5 in the ascending colon with polypectomy and no evidence of malignancy, multiple fragments of adenomatous polyps x2 in the transverse colon with polypectomy and no evidence of malignancy, multiple fragments of adenomatous polyps x2 in the descending colon with polypectomy and no evidence of malignancy, and adenomatous polyps x3 in the sigmoid colon with polypectomy and no evidence of malignancy.  She was recommended repeat colonoscopy in 3 years.

## 2023-07-13 NOTE — PROGRESS NOTES
Subjective:       Patient ID: Cheryl Bauer is a 58 y.o. female.    Chief Complaint: Abdominal Pain (Started with epigastric pain in February, now having pain over entire abdomen) and Nausea    This 58-year-old  female with a history of hypertension, hypertriglyceridemia, multinodular goiter s/p right thyroidectomy and isthmus, myotonic muscular dystrophy, left bundle branch block, and colon polyps presents accompanied by her significant other for a follow-up visit.  She was initially seen March 18, 2021 and referred for alternating bowel habits at that time.  She reported intermittent epigastric abdominal pain for the past 1-1/2 months described as aching, cramping, and nonradiating.  The pain was worsened with eating and she was unable to identify specific food triggers.  She denied relieving factors.  She had occasional acid reflux and pyrosis and denied taking anything for symptomatic relief.  Her appetite was good and her weight was stable.  She denied nocturnal symptoms, fever, chills, nausea, vomiting, odynophagia, dysphagia, or early satiety.  She reported fluctuating bowel habits between loose to watery stools 5-6 times per day for 2-3 consecutive days followed by constipation with frequent straining and occasional bright red rectal bleeding with bowel movements noted on the tissue after wiping for 3 to 5 days before the diarrhea started again.  She denied melena, fecal urgency, fecal incontinence, tenesmus, or pain with defecation.  She denied taking anything for treatment of constipation or diarrhea.    Laboratory results March 9, 2021 revealed fecal calprotectin 29, pancreatic elastase 371, and negative stool culture, ova and parasite, Giardia, fecal leukocyte, rotavirus, and Cryptosporidium.      Abdominal ultrasound April 7, 2021 revealed diffuse hepatic steatosis and normal gallbladder.      She underwent EGD and colonoscopy November 17, 2021.  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  Pathology revealed mild chronic gastritis with no dysplasia identified and no Helicobacter pylori organisms identified.     Colonoscopy revealed hemorrhoids found on perianal exam, examined portion of ileum normal, multiple fragments of adenomatous polyps x5 in the ascending colon with polypectomy and no evidence of malignancy, multiple fragments of adenomatous polyps x2 in the transverse colon with polypectomy and no evidence of malignancy, multiple fragments of adenomatous polyps x2 in the descending colon with polypectomy and no evidence of malignancy, and adenomatous polyps x3 in the sigmoid colon with polypectomy and no evidence of malignancy.  She was recommended repeat colonoscopy in 3 years.    Today, she presents accompanied by her significant other and reports minimal change in symptoms from initial office visit in March 2021.  She feels as though epigastric abdominal pain has worsened over the past few months and is described aching, cramping, and radiating down above her umbilical region.  She is unable to identify specific triggers or relieving factors.  She denies appetite changes, unintentional weight loss, fever, chills, nausea, vomiting, hematemesis, odynophagia, dysphagia, acid reflux, or pyrosis.  She has occasional early satiety.  Bowel habits continue to fluctuate between loose stools and formed to soft stools 3-5 times daily.  She denies taking anything for symptomatic relief as she will get constipated.  She denies melena, hematochezia, fecal urgency, fecal incontinence, or pain with defecation.    She underwent colonoscopy August 2, 2017 with findings of tubulovillous adenomatous polyp at the cecum with no evidence of malignancy, tubular adenomatous polyp at 30 cm with no evidence of malignancy, tubular adenomatous polyp at 43 cm with no evidence  of malignancy.  Three cecal polyps were noted with the largest measuring 1.5 cm.  Biopsies were obtained of the 2 smaller polyps, and the 1.5 cm polyp was removed in its entirety by snare.  She was recommended repeat colonoscopy in 3 years.  She denies regular NSAID use or use of blood thinners.  She denies tobacco use and drinks 5-6 beers per day.  She is a former smoker.  She denies a family history of IBD, colon polyps, or colon cancer.    Review of patient's allergies indicates:   Allergen Reactions    Dilantin [phenytoin sodium extended] Hives     Past Medical History:   Diagnosis Date    Hypertension     Muscular dystrophy      Past Surgical History:   Procedure Laterality Date    CARPAL TUNNEL RELEASE Left     HYSTERECTOMY      THYROIDECTOMY Right     TONSILLECTOMY AND ADENOIDECTOMY      TUBAL LIGATION       Family History:   family history includes Diabetes in her father; Heart attack (age of onset: 68) in her father; Heart disease in her father; Heart failure in her father; Hypertension in her father.    Social History:    reports that she quit smoking about 14 years ago. Her smoking use included cigarettes. She has never used smokeless tobacco. She reports current alcohol use of about 1.0 standard drink per week. She reports that she does not use drugs.    Review of Systems  Negative except as noted in the HPI.      Objective:      Physical Exam  Constitutional:       Appearance: Normal appearance.      Comments: Seated in wheelchair   HENT:      Head: Normocephalic.      Mouth/Throat:      Mouth: Mucous membranes are moist.   Eyes:      Extraocular Movements: Extraocular movements intact.      Conjunctiva/sclera: Conjunctivae normal.      Pupils: Pupils are equal, round, and reactive to light.   Cardiovascular:      Rate and Rhythm: Normal rate and regular rhythm.      Pulses: Normal pulses.      Heart sounds: Normal heart sounds.   Pulmonary:      Effort: Pulmonary effort is normal.      Breath sounds:  Normal breath sounds.   Abdominal:      General: Bowel sounds are normal.      Palpations: Abdomen is soft.      Comments: Tenderness noted to epigastric abdominal region with deep palpation, no rebound or guarding   Musculoskeletal:         General: Normal range of motion.      Cervical back: Normal range of motion and neck supple.   Skin:     General: Skin is warm and dry.   Neurological:      General: No focal deficit present.      Mental Status: She is alert and oriented to person, place, and time.   Psychiatric:         Mood and Affect: Mood normal.         Behavior: Behavior normal.         Thought Content: Thought content normal.         Judgment: Judgment normal.       Home Medications:     Current Outpatient Medications   Medication Sig    albuterol (PROVENTIL/VENTOLIN HFA) 90 mcg/actuation inhaler Inhale 1 puff into the lungs every 4 (four) hours as needed.    ANORO ELLIPTA 62.5-25 mcg/actuation DsDv Inhale 1 puff into the lungs once daily.    lisinopriL (PRINIVIL,ZESTRIL) 5 MG tablet Take 1 tablet (5 mg total) by mouth once daily.    mexiletine (MEXITIL) 200 MG Cap Take 200 mg by mouth every 8 (eight) hours.    omeprazole (PRILOSEC) 40 MG capsule Take 1 capsule by mouth once daily    paroxetine (PAXIL) 10 MG tablet Take 1 tablet (10 mg total) by mouth once daily.    traMADoL (ULTRAM) 50 mg tablet Take 50 mg by mouth every 6 (six) hours as needed.     Imaging Results:     Narrative & Impression  Abdominal ultrasound     INDICATION: Abdominal pain     COMPARISON: None     FINDINGS:     Visualized pancreas, abdominal aorta and IVC are normal.     Liver measures 14.9cm with decreased echogenicity. No focal hepatic  mass or intrahepatic ductal dilatation. Normal directional flow is in  the main portal vein.     Gallbladder is normal without stones, wall thickening or  pericholecystic fluid. Common bile duct measures 0.4cm.     Right kidney measures 8.8 x 4.6 x 4.8cm. Left kidney measures 11.3 x  5.0 x 4.2cm.  No stones or hydronephrosis. Renal echogenicity is  normal.     Spleen measures 8.8cm in length.      IMPRESSION:  1. Diffuse hepatic steatosis  2. Normal gallbladder         Electronically Signed By: Fam Hurt MD  Date/Time Signed: 04/07/2021 09:00    Assessment/Plan:     Problem List Items Addressed This Visit          GI    Epigastric abdominal pain - Primary     Laboratory results March 9, 2021 revealed fecal calprotectin 29, pancreatic elastase 371, and negative stool culture, ova and parasite, Giardia, fecal leukocyte, rotavirus, and Cryptosporidium.    Abdominal ultrasound April 7, 2021 revealed diffuse hepatic steatosis and normal gallbladder.    She underwent EGD and colonoscopy November 17, 2021.  EGD revealed ectopic gastric mucosa at the cricopharyngeus, esophagogastric landmarks identified, erythematous mucosa in the pre-pyloric region of the stomach, and normal examined duodenum.  Pathology revealed mild chronic gastritis with no dysplasia identified and no Helicobacter pylori organisms identified.   Colonoscopy revealed hemorrhoids found on perianal exam, examined portion of ileum normal, multiple fragments of adenomatous polyps x5 in the ascending colon with polypectomy and no evidence of malignancy, multiple fragments of adenomatous polyps x2 in the transverse colon with polypectomy and no evidence of malignancy, multiple fragments of adenomatous polyps x2 in the descending colon with polypectomy and no evidence of malignancy, and adenomatous polyps x3 in the sigmoid colon with polypectomy and no evidence of malignancy.  She was recommended repeat colonoscopy in 3 years.  GERD lifestyle modifications  Reflux precautions  Avoid NSAID use  EGD  Recommend alcohol cessation   Continue omeprazole 40 mg daily   Bentyl 10 mg as needed for abdominal pain   H pylori stool antigen   CBC, CMP, lipase   Call with updates         Relevant Medications    omeprazole (PRILOSEC) 40 MG capsule     dicyclomine (BENTYL) 10 MG capsule    Other Relevant Orders    CBC Auto Differential    Comprehensive Metabolic Panel    Lipase    Case Request Endoscopy: EGD (ESOPHAGOGASTRODUODENOSCOPY) (Completed)    Helicobacter Pylori Antigen Fecal EIA    Irregular bowel habits     See above  Recommend soluble fiber supplementation          Relevant Orders    CBC Auto Differential    Comprehensive Metabolic Panel    Lipase    Case Request Endoscopy: EGD (ESOPHAGOGASTRODUODENOSCOPY) (Completed)    Helicobacter Pylori Antigen Fecal EIA    Personal history of colonic polyps     Colonoscopy revealed hemorrhoids found on perianal exam, examined portion of ileum normal, multiple fragments of adenomatous polyps x5 in the ascending colon with polypectomy and no evidence of malignancy, multiple fragments of adenomatous polyps x2 in the transverse colon with polypectomy and no evidence of malignancy, multiple fragments of adenomatous polyps x2 in the descending colon with polypectomy and no evidence of malignancy, and adenomatous polyps x3 in the sigmoid colon with polypectomy and no evidence of malignancy.  She was recommended repeat colonoscopy in 3 years.            Other    Early satiety     See above         Relevant Medications    omeprazole (PRILOSEC) 40 MG capsule    dicyclomine (BENTYL) 10 MG capsule    Other Relevant Orders    CBC Auto Differential    Comprehensive Metabolic Panel    Lipase    Case Request Endoscopy: EGD (ESOPHAGOGASTRODUODENOSCOPY) (Completed)    Helicobacter Pylori Antigen Fecal EIA

## 2023-07-13 NOTE — ASSESSMENT & PLAN NOTE
Laboratory results March 9, 2021 revealed fecal calprotectin 29, pancreatic elastase 371, and negative stool culture, ova and parasite, Giardia, fecal leukocyte, rotavirus, and Cryptosporidium.    Abdominal ultrasound April 7, 2021 revealed diffuse hepatic steatosis and normal gallbladder.    She underwent EGD and colonoscopy November 17, 2021.  EGD revealed ectopic gastric mucosa at the cricopharyngeus, esophagogastric landmarks identified, erythematous mucosa in the pre-pyloric region of the stomach, and normal examined duodenum.  Pathology revealed mild chronic gastritis with no dysplasia identified and no Helicobacter pylori organisms identified.   Colonoscopy revealed hemorrhoids found on perianal exam, examined portion of ileum normal, multiple fragments of adenomatous polyps x5 in the ascending colon with polypectomy and no evidence of malignancy, multiple fragments of adenomatous polyps x2 in the transverse colon with polypectomy and no evidence of malignancy, multiple fragments of adenomatous polyps x2 in the descending colon with polypectomy and no evidence of malignancy, and adenomatous polyps x3 in the sigmoid colon with polypectomy and no evidence of malignancy.  She was recommended repeat colonoscopy in 3 years.  GERD lifestyle modifications  Reflux precautions  Avoid NSAID use  EGD  Recommend alcohol cessation   Continue omeprazole 40 mg daily   Bentyl 10 mg as needed for abdominal pain   H pylori stool antigen   CBC, CMP, lipase   Call with updates

## 2023-07-14 ENCOUNTER — TELEPHONE (OUTPATIENT)
Dept: GASTROENTEROLOGY | Facility: CLINIC | Age: 58
End: 2023-07-14
Payer: MEDICAID

## 2023-07-14 NOTE — TELEPHONE ENCOUNTER
Call to pt, left msg for return call.    ----- Message from LATRELL Currie sent at 7/13/2023 11:11 AM CDT -----  Please let her know that all her lab work looks okay and H pylori stool antigen is negative.  Thanks

## 2023-07-29 DIAGNOSIS — R68.81 EARLY SATIETY: ICD-10-CM

## 2023-07-29 DIAGNOSIS — R10.13 EPIGASTRIC ABDOMINAL PAIN: ICD-10-CM

## 2023-08-01 RX ORDER — DICYCLOMINE HYDROCHLORIDE 10 MG/1
CAPSULE ORAL
Qty: 120 CAPSULE | Refills: 0 | Status: SHIPPED | OUTPATIENT
Start: 2023-08-01 | End: 2023-08-22

## 2023-08-07 NOTE — PROGRESS NOTES
CHIEF COMPLAINT:   Chief Complaint   Patient presents with    F/U 6 monht visit     Patient c/o having occassionally tightness in chest                   Review of patient's allergies indicates:   Allergen Reactions    Dilantin [phenytoin sodium extended] Hives                                          HPI:  Cheryl Bauer 58 y.o. female  female with PMH of myotonic dystrophy, multinodular goiter, left bundle branch block, hypertension, hyperlipidemia, COPD, (wears home oxygen 2 LNC qhs), and colon polyps who presents to clinic for results of testing and ongoing care. Patient is followed in cardiology clinic due to her history of myotonic dystrophy.  Patient follows a neurologist/myotonic dystrophy physician in [place], Dr. Haines, who recommends EKG and Echocardiogram every 6 months along with an annual stress test.  Latest nuclear Stress test on 3.14.23 revealed no evidence of myocardial ischemia or infarction.  Echocardiogram obtained on 6.14.23 revealed preserved EF of 60%, normal diastolic function.     Patient presents today with occasional episodes of transient chest tightness that she states occurs at random.  She also endorses stable shortness of breath with exertion that has not progressed or worsened since last seen.  However, she attributes this to her COPD.  She also endorses occasional palpitations but denies any increase in frequency or severity.  She states her ADLs remain unchanged, the patient utilizes a walker at home and a wheelchair for longer distances.  She reports compliance with her current medications and is tolerating without issue.      Previous diagnostic testing  ECHO EF- 60%, normal left ventricular diastolic function and no significant valvular abnormalities (6.14.23)  Normal myocardial perfusion scan.  There is no evidence of myocardial ischemia or infarction (3.14.23)  Normal myocardial perfusion study with no evidence of ischemia or scar (3.28.22)  ECHO -EF-  59%, PASP is estimated to be normal, no significant valvular abnormalities, in comparison to previous echocardiogram performed on 10/26/2021 there were no significant changes. (4.14.22)  30 day event monitor - Sinus rhythm with average HR 84 bpm, minimum 60 bpm, and max 137 bpm.  There were no significant pauses, VT, SVT or AFib.  4. Reported episodes the patient was noted to be in sinus. (April 2022)  Echocardiogram 10/2021 EF 50-55%, mildly elevated PASP  Echocardiogram from 3/10/2021 with EF 50-55%  30-day event monitor from December 2020 with no significant arrhythmias.  IDALIA testing in November 2019 which revealed no evidence of significant arterial insufficiency.   Stress Echocardiogram in May 2019 which was negative for inducible ischemia.     Denies smoking. Quit 12 years prior. Used to smoke 1 pack/day x 20 years.  Drinks 3 natural light beers per day  Denies drug use                                                                                                                                                                                                                                                                                                                                                                                                                                                                                               Patient Active Problem List   Diagnosis    Shortness of breath    Left bundle branch block    HTN (hypertension), benign    Hyperlipidemia    Epigastric abdominal pain    Early satiety    Irregular bowel habits    Personal history of colonic polyps     Past Surgical History:   Procedure Laterality Date    CARPAL TUNNEL RELEASE Left     HYSTERECTOMY      THYROIDECTOMY Right     TONSILLECTOMY AND ADENOIDECTOMY      TUBAL LIGATION       Social History     Socioeconomic History    Marital status:    Tobacco Use    Smoking status: Former     Current packs/day: 0.00      Types: Cigarettes     Quit date: 2009     Years since quittin.6    Smokeless tobacco: Never    Tobacco comments:     Quit 15 years ago   Substance and Sexual Activity    Alcohol use: Yes     Alcohol/week: 1.0 standard drink of alcohol     Types: 1 Cans of beer per week     Comment: daily    Drug use: Never    Sexual activity: Yes        Family History   Problem Relation Age of Onset    Hypertension Father     Heart failure Father     Heart disease Father     Heart attack Father 68    Diabetes Father          Current Outpatient Medications:     albuterol (PROVENTIL/VENTOLIN HFA) 90 mcg/actuation inhaler, Inhale 1 puff into the lungs every 4 (four) hours as needed., Disp: , Rfl:     ANORO ELLIPTA 62.5-25 mcg/actuation DsDv, Inhale 1 puff into the lungs once daily., Disp: , Rfl:     dicyclomine (BENTYL) 10 MG capsule, TAKE 1 CAPSULE BY MOUTH 4 TIMES DAILY BEFORE MEAL(S) AND NIGHTLY, Disp: 120 capsule, Rfl: 0    lisinopriL (PRINIVIL,ZESTRIL) 5 MG tablet, Take 1 tablet (5 mg total) by mouth once daily., Disp: 90 tablet, Rfl: 3    mexiletine (MEXITIL) 200 MG Cap, Take 200 mg by mouth every 8 (eight) hours., Disp: , Rfl:     omega-3 fatty acids/fish oil (FISH OIL-OMEGA-3 FATTY ACIDS) 300-1,000 mg capsule, Take 3 capsules by mouth 2 (two) times a day., Disp: , Rfl:     omeprazole (PRILOSEC) 40 MG capsule, Take 1 capsule (40 mg total) by mouth once daily., Disp: 30 capsule, Rfl: 5    paroxetine (PAXIL) 10 MG tablet, Take 1 tablet (10 mg total) by mouth once daily., Disp: 60 tablet, Rfl: 0    traMADoL (ULTRAM) 50 mg tablet, Take 50 mg by mouth every 6 (six) hours as needed., Disp: , Rfl:      ROS:                                                                                                                                                                             Review of Systems   Constitutional: Negative.    HENT: Negative.     Eyes: Negative.    Respiratory:  Positive for shortness of breath.    Cardiovascular:  " Positive for chest pain.   Gastrointestinal: Negative.    Genitourinary: Negative.    Musculoskeletal: Negative.    Skin: Negative.    Neurological: Negative.    Endo/Heme/Allergies: Negative.    Psychiatric/Behavioral: Negative.          Blood pressure 118/84, pulse 85, temperature 98.4 °F (36.9 °C), temperature source Oral, resp. rate 20, height 5' 7" (1.702 m), weight 73.5 kg (162 lb 0.6 oz), SpO2 (!) 94 %.   PE:  Physical Exam  HENT:      Head: Normocephalic.      Nose: Nose normal.   Eyes:      Pupils: Pupils are equal, round, and reactive to light.   Cardiovascular:      Rate and Rhythm: Normal rate and regular rhythm.   Pulmonary:      Effort: Pulmonary effort is normal.   Abdominal:      General: Abdomen is flat. Bowel sounds are normal.      Palpations: Abdomen is soft.   Musculoskeletal:         General: Normal range of motion.      Cervical back: Normal range of motion.   Skin:     General: Skin is warm.   Neurological:      General: No focal deficit present.      Mental Status: She is alert.   Psychiatric:         Mood and Affect: Mood normal.                ASSESSMENT/PLAN:  Left bundle branch block in the setting of muscular dystrophy  - Reports occasional anterior chest tightness that occurs at random  - Latest Normal myocardial perfusion study with no evidence of ischemia or scar (3.14.23)  - Latest Normal myocardial perfusion study with no evidence of ischemia or scar (3.28.22)  - Lexiscan stress test- negative for ischemia ( 5.6.21)  - EF - 60% per ECHO 6.14.23  - Recommended EKG/Echo every 6 months and stress test annually per patient's neurologist Dr. Haines  - Echocardiogram stress test due in December 2023. Nuclear Stress Test Due in March 2024    COPD (wear oxygen 2 L nasal cannula at night)  - Reports stable DOVE that improves with inhalers.  - continue management per P pulmonology    Hypertension  - BP at goal  - Continue lisinopril 5 mg daily  - Counseled on low salt diet "     Hyperlipidemia  - Statin and zetia contraindicated due to muscular dystrophy  - Continue omega-3  - Management per PCP    Muscular dystrophy  - Management per neurologist in Dr. Yancy Mercedes    EKG   Echocardiogram in December 2023   Nuclear Stress Test Due in March 2024  Follow up in cardiology clinic in 7 months or sooner if needed   Follow up with PCP as directed

## 2023-08-14 ENCOUNTER — OFFICE VISIT (OUTPATIENT)
Dept: CARDIOLOGY | Facility: CLINIC | Age: 58
End: 2023-08-14
Payer: MEDICAID

## 2023-08-14 VITALS
WEIGHT: 162.06 LBS | BODY MASS INDEX: 25.44 KG/M2 | TEMPERATURE: 98 F | SYSTOLIC BLOOD PRESSURE: 118 MMHG | DIASTOLIC BLOOD PRESSURE: 84 MMHG | HEART RATE: 85 BPM | OXYGEN SATURATION: 94 % | RESPIRATION RATE: 20 BRPM | HEIGHT: 67 IN

## 2023-08-14 DIAGNOSIS — G71.11 MYOTONIC DYSTROPHY: ICD-10-CM

## 2023-08-14 DIAGNOSIS — R07.89 OTHER CHEST PAIN: Primary | ICD-10-CM

## 2023-08-14 DIAGNOSIS — R06.02 SHORTNESS OF BREATH: ICD-10-CM

## 2023-08-14 DIAGNOSIS — I10 HTN (HYPERTENSION), BENIGN: ICD-10-CM

## 2023-08-14 DIAGNOSIS — E78.2 MIXED HYPERLIPIDEMIA: ICD-10-CM

## 2023-08-14 PROCEDURE — 1159F PR MEDICATION LIST DOCUMENTED IN MEDICAL RECORD: ICD-10-PCS | Mod: CPTII,,, | Performed by: NURSE PRACTITIONER

## 2023-08-14 PROCEDURE — 3079F DIAST BP 80-89 MM HG: CPT | Mod: CPTII,,, | Performed by: NURSE PRACTITIONER

## 2023-08-14 PROCEDURE — 1160F RVW MEDS BY RX/DR IN RCRD: CPT | Mod: CPTII,,, | Performed by: NURSE PRACTITIONER

## 2023-08-14 PROCEDURE — 3008F BODY MASS INDEX DOCD: CPT | Mod: CPTII,,, | Performed by: NURSE PRACTITIONER

## 2023-08-14 PROCEDURE — 99214 PR OFFICE/OUTPT VISIT, EST, LEVL IV, 30-39 MIN: ICD-10-PCS | Mod: S$PBB,,, | Performed by: NURSE PRACTITIONER

## 2023-08-14 PROCEDURE — 3074F SYST BP LT 130 MM HG: CPT | Mod: CPTII,,, | Performed by: NURSE PRACTITIONER

## 2023-08-14 PROCEDURE — 4010F ACE/ARB THERAPY RXD/TAKEN: CPT | Mod: CPTII,,, | Performed by: NURSE PRACTITIONER

## 2023-08-14 PROCEDURE — 93005 ELECTROCARDIOGRAM TRACING: CPT

## 2023-08-14 PROCEDURE — 3079F PR MOST RECENT DIASTOLIC BLOOD PRESSURE 80-89 MM HG: ICD-10-PCS | Mod: CPTII,,, | Performed by: NURSE PRACTITIONER

## 2023-08-14 PROCEDURE — 4010F PR ACE/ARB THEARPY RXD/TAKEN: ICD-10-PCS | Mod: CPTII,,, | Performed by: NURSE PRACTITIONER

## 2023-08-14 PROCEDURE — 99214 OFFICE O/P EST MOD 30 MIN: CPT | Mod: S$PBB,,, | Performed by: NURSE PRACTITIONER

## 2023-08-14 PROCEDURE — 1159F MED LIST DOCD IN RCRD: CPT | Mod: CPTII,,, | Performed by: NURSE PRACTITIONER

## 2023-08-14 PROCEDURE — 1160F PR REVIEW ALL MEDS BY PRESCRIBER/CLIN PHARMACIST DOCUMENTED: ICD-10-PCS | Mod: CPTII,,, | Performed by: NURSE PRACTITIONER

## 2023-08-14 PROCEDURE — 3008F PR BODY MASS INDEX (BMI) DOCUMENTED: ICD-10-PCS | Mod: CPTII,,, | Performed by: NURSE PRACTITIONER

## 2023-08-14 PROCEDURE — 99214 OFFICE O/P EST MOD 30 MIN: CPT | Mod: PBBFAC | Performed by: NURSE PRACTITIONER

## 2023-08-14 PROCEDURE — 3074F PR MOST RECENT SYSTOLIC BLOOD PRESSURE < 130 MM HG: ICD-10-PCS | Mod: CPTII,,, | Performed by: NURSE PRACTITIONER

## 2023-08-14 RX ORDER — AMOXICILLIN 500 MG
3 CAPSULE ORAL 2 TIMES DAILY
COMMUNITY

## 2023-08-14 NOTE — PATIENT INSTRUCTIONS
EKG  Echocardiogram in December 2023  Nuclear Stress Test Due in March 2024  Follow up in cardiology clinic in 7 months or sooner if needed   Follow up with PCP as directed

## 2023-08-22 DIAGNOSIS — R10.13 EPIGASTRIC ABDOMINAL PAIN: ICD-10-CM

## 2023-08-22 DIAGNOSIS — R68.81 EARLY SATIETY: ICD-10-CM

## 2023-08-22 RX ORDER — DICYCLOMINE HYDROCHLORIDE 10 MG/1
CAPSULE ORAL
Qty: 120 CAPSULE | Refills: 0 | Status: ON HOLD | OUTPATIENT
Start: 2023-08-22 | End: 2023-11-01 | Stop reason: ALTCHOICE

## 2023-09-21 ENCOUNTER — OFFICE VISIT (OUTPATIENT)
Dept: FAMILY MEDICINE | Facility: CLINIC | Age: 58
End: 2023-09-21
Payer: MEDICAID

## 2023-09-21 VITALS
HEIGHT: 67 IN | OXYGEN SATURATION: 99 % | TEMPERATURE: 98 F | BODY MASS INDEX: 25.27 KG/M2 | SYSTOLIC BLOOD PRESSURE: 114 MMHG | WEIGHT: 161 LBS | DIASTOLIC BLOOD PRESSURE: 80 MMHG | RESPIRATION RATE: 18 BRPM | HEART RATE: 88 BPM

## 2023-09-21 DIAGNOSIS — Z12.31 ENCOUNTER FOR SCREENING MAMMOGRAM FOR MALIGNANT NEOPLASM OF BREAST: ICD-10-CM

## 2023-09-21 DIAGNOSIS — R23.2 HOT FLASHES: ICD-10-CM

## 2023-09-21 DIAGNOSIS — I10 HTN (HYPERTENSION), BENIGN: Primary | ICD-10-CM

## 2023-09-21 DIAGNOSIS — Z23 IMMUNIZATION DUE: ICD-10-CM

## 2023-09-21 DIAGNOSIS — L98.9 LESION OF SUBCUTANEOUS TISSUE: ICD-10-CM

## 2023-09-21 PROCEDURE — 3008F PR BODY MASS INDEX (BMI) DOCUMENTED: ICD-10-PCS | Mod: CPTII,,, | Performed by: FAMILY MEDICINE

## 2023-09-21 PROCEDURE — 3079F DIAST BP 80-89 MM HG: CPT | Mod: CPTII,,, | Performed by: FAMILY MEDICINE

## 2023-09-21 PROCEDURE — 4010F PR ACE/ARB THEARPY RXD/TAKEN: ICD-10-PCS | Mod: CPTII,,, | Performed by: FAMILY MEDICINE

## 2023-09-21 PROCEDURE — 3079F PR MOST RECENT DIASTOLIC BLOOD PRESSURE 80-89 MM HG: ICD-10-PCS | Mod: CPTII,,, | Performed by: FAMILY MEDICINE

## 2023-09-21 PROCEDURE — 90686 IIV4 VACC NO PRSV 0.5 ML IM: CPT | Mod: PBBFAC

## 2023-09-21 PROCEDURE — 1160F RVW MEDS BY RX/DR IN RCRD: CPT | Mod: CPTII,,, | Performed by: FAMILY MEDICINE

## 2023-09-21 PROCEDURE — 1159F PR MEDICATION LIST DOCUMENTED IN MEDICAL RECORD: ICD-10-PCS | Mod: CPTII,,, | Performed by: FAMILY MEDICINE

## 2023-09-21 PROCEDURE — 90471 IMMUNIZATION ADMIN: CPT | Mod: PBBFAC

## 2023-09-21 PROCEDURE — 99214 OFFICE O/P EST MOD 30 MIN: CPT | Mod: PBBFAC | Performed by: FAMILY MEDICINE

## 2023-09-21 PROCEDURE — 4010F ACE/ARB THERAPY RXD/TAKEN: CPT | Mod: CPTII,,, | Performed by: FAMILY MEDICINE

## 2023-09-21 PROCEDURE — 3008F BODY MASS INDEX DOCD: CPT | Mod: CPTII,,, | Performed by: FAMILY MEDICINE

## 2023-09-21 PROCEDURE — 1159F MED LIST DOCD IN RCRD: CPT | Mod: CPTII,,, | Performed by: FAMILY MEDICINE

## 2023-09-21 PROCEDURE — 99214 PR OFFICE/OUTPT VISIT, EST, LEVL IV, 30-39 MIN: ICD-10-PCS | Mod: S$PBB,,, | Performed by: FAMILY MEDICINE

## 2023-09-21 PROCEDURE — 1160F PR REVIEW ALL MEDS BY PRESCRIBER/CLIN PHARMACIST DOCUMENTED: ICD-10-PCS | Mod: CPTII,,, | Performed by: FAMILY MEDICINE

## 2023-09-21 PROCEDURE — 3074F PR MOST RECENT SYSTOLIC BLOOD PRESSURE < 130 MM HG: ICD-10-PCS | Mod: CPTII,,, | Performed by: FAMILY MEDICINE

## 2023-09-21 PROCEDURE — 3074F SYST BP LT 130 MM HG: CPT | Mod: CPTII,,, | Performed by: FAMILY MEDICINE

## 2023-09-21 PROCEDURE — 99214 OFFICE O/P EST MOD 30 MIN: CPT | Mod: S$PBB,,, | Performed by: FAMILY MEDICINE

## 2023-09-21 RX ORDER — PAROXETINE 10 MG/1
10 TABLET, FILM COATED ORAL DAILY
Qty: 90 TABLET | Refills: 1 | Status: SHIPPED | OUTPATIENT
Start: 2023-09-21

## 2023-09-21 RX ORDER — LISINOPRIL 5 MG/1
5 TABLET ORAL DAILY
Qty: 90 TABLET | Refills: 1 | Status: SHIPPED | OUTPATIENT
Start: 2023-09-21

## 2023-09-21 RX ADMIN — INFLUENZA VIRUS VACCINE 0.5 ML: 15; 15; 15; 15 SUSPENSION INTRAMUSCULAR at 09:09

## 2023-09-21 NOTE — PROGRESS NOTES
"Patient Name: Cheryl Bauer   : 1965  MRN: [id number]     SUBJECTIVE:  Cheryl Bauer is a 58 y.o. female here for Follow-up (Patient has bump on the left side of her lip. She states that she puts warm compresses on it but it keeps getting bigger. )  .    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  Feels well and stable with no complaints other than she wanted to mention again the skin bump she has had for the past year or so above upper lip.  She was told from previous PCP a year ago to apply warm compresses, and she is been doing that with no changes.  She has actually noticed increased size and a bit tender with no skin changes such as erythema or warmth.    Follows regularly with cardiology. Saw them last month.  Had labs done and lipid panel high, but as also cardiology recommended: Statin and zetia contraindicated due to muscular dystrophy. Continue omega-3"  Sees pulmonary in [place] and neurology in [place]. Neurology recommends EKG and Echocardiogram every 6 months, along with an annual stress test.  Bp well controlled on lisinopril 5 mg.  Patient on paxil for hot flashes. It does help.  Colonoscopy - repeat in 3 years. Follows with GI  Mammogram normal. Due in few months.        ALLERGIES:   Review of patient's allergies indicates:   Allergen Reactions    Dilantin [phenytoin sodium extended] Hives         ROS:  Review of Systems   Constitutional:  Negative for chills, fever and weight loss.   HENT:  Negative for congestion, ear pain and sore throat.    Eyes:  Negative for blurred vision.   Respiratory:  Negative for cough, shortness of breath and wheezing.    Cardiovascular:  Negative for chest pain, palpitations, leg swelling and PND.   Gastrointestinal:  Negative for abdominal pain, nausea and vomiting.   Genitourinary:  Negative for dysuria and hematuria.   Skin:  " "Negative for rash.   Neurological:  Positive for weakness (generalized, due to muscular dystrophy). Negative for dizziness and headaches.   Psychiatric/Behavioral:  Negative for depression. The patient is not nervous/anxious.          OBJECTIVE:  Vital signs  Vitals:    09/21/23 0850   BP: 114/80   Pulse: 88   Resp: 18   Temp: 97.9 °F (36.6 °C)   TempSrc: Oral   SpO2: 99%   Weight: 73 kg (161 lb)   Height: 5' 7" (1.702 m)        Body mass index is 25.22 kg/m².    PHYSICAL EXAM:   Physical Exam  Vitals reviewed.   Constitutional:       General: She is not in acute distress.     Appearance: Normal appearance. She is not ill-appearing.   HENT:      Head: Normocephalic and atraumatic.      Right Ear: External ear normal.      Left Ear: External ear normal.      Nose: Nose normal. No rhinorrhea.      Mouth/Throat:      Mouth: Mucous membranes are moist.   Eyes:      General: No scleral icterus.        Right eye: No discharge.         Left eye: No discharge.      Conjunctiva/sclera: Conjunctivae normal.      Pupils: Pupils are equal, round, and reactive to light.   Cardiovascular:      Rate and Rhythm: Normal rate and regular rhythm.   Pulmonary:      Effort: Pulmonary effort is normal. No respiratory distress.      Breath sounds: No wheezing, rhonchi or rales.   Abdominal:      General: Bowel sounds are normal. There is no distension.      Palpations: Abdomen is soft.      Tenderness: There is no abdominal tenderness.   Musculoskeletal:      Cervical back: Normal range of motion and neck supple. No rigidity or tenderness.      Right lower leg: No edema.      Left lower leg: No edema.      Comments: Sitting in a wheelchair   Skin:     General: Skin is warm.      Coloration: Skin is not pale.      Findings: Lesion (left sided, above upper lip a small subcutantous mobile mass noted, tender. no skin changes such as erythema, warmth or any open wounds, no purulence, no blisters) present.   Neurological:      Mental Status: " She is alert and oriented to person, place, and time. Mental status is at baseline.   Psychiatric:         Mood and Affect: Mood normal.         Behavior: Behavior normal.          ASSESSMENT/PLAN:  Cheryl was seen today for follow-up.    Diagnoses and all orders for this visit:    HTN (hypertension), benign  -     lisinopriL (PRINIVIL,ZESTRIL) 5 MG tablet; Take 1 tablet (5 mg total) by mouth once daily.    Lesion of subcutaneous tissue  -     US Extremity Non Vascular Limited Left; Future    Hot flashes  -     paroxetine (PAXIL) 10 MG tablet; Take 1 tablet (10 mg total) by mouth once daily.    Immunization due  -     influenza (QUADRIVALENT PF) vaccine 0.5 mL    Encounter for screening mammogram for malignant neoplasm of breast  -     Mammo Digital Screening Bilat w/ Vladimir; Future      PLAN  -well-controlled hypertension.  Continue with lisinopril 5 mg daily.  -regarding the skin bump, it is increasing in size and tender, there for at least a year.  Further workup with ultrasound of the area and depending on the results will refer for removal.  -hot flashes well-controlled on Paxil 10 mg daily.  Continue with it.  -flu shot given.  Mammogram ordered.  Continue to follow up with specialists.  Will need to have medical records from pulmonology and Neurology.      Previous medical history/lab work/radiology reviewed and considered during medical management decisions.   Medication list reviewed and medication reconciliation performed.  Patient was provided  and care about his/her current diagnosis (es) and medications including risk/benefit and side effects/adverse events, over the counter medication uses/doses, home self-care and contact precautions,  and red flags and indications for when to seek immediate medical attention.   Patient was advised to continue compliance with current medication list and medical recommendations.  Recommended/ Advised continued compliance with recommended eating habits/ diets for  medical conditions and exercise 150 minutes/ week (if possible) for medical condition (s).        RESULTS:  No results found for this or any previous visit (from the past 1008 hour(s)).        Follow Up:  In 6 months.    [unfilled]    This note was created with the assistance of a voice recognition software or phone dictation. There may be transcription errors as a result of using this technology however minimal. Effort has been made to assure accuracy of transcription but any obvious errors or omissions should be clarified with the author of the document

## 2023-10-30 ENCOUNTER — ANESTHESIA EVENT (OUTPATIENT)
Dept: ENDOSCOPY | Facility: HOSPITAL | Age: 58
End: 2023-10-30
Payer: MEDICAID

## 2023-10-30 NOTE — ANESTHESIA PREPROCEDURE EVALUATION
10/30/2023  Cheryl Bauer is a 58 y.o., female for EGD    History of abdominal pain , HTN, HLD, Right thyroidectomy, myotonic muscular dystrophy (walker & wheelchair use) , COPD (Home Oxygen 2 LPM qhs), LBBB & CLN polyps        Active Ambulatory Problems     Diagnosis Date Noted    Shortness of breath 10/18/2022    Left bundle branch block 12/05/2022    HTN (hypertension), benign 12/05/2022    Hyperlipidemia 12/05/2022    Epigastric abdominal pain 07/13/2023    Early satiety 07/13/2023    Irregular bowel habits 07/13/2023    Personal history of colonic polyps 07/13/2023     Resolved Ambulatory Problems     Diagnosis Date Noted    No Resolved Ambulatory Problems     Past Medical History:   Diagnosis Date    COPD (chronic obstructive pulmonary disease)     Hypertension     Muscular dystrophy        Past Surgical History:   Procedure Laterality Date    CARPAL TUNNEL RELEASE Left     HYSTERECTOMY      KNEE SURGERY Left     THYROIDECTOMY Right     TONSILLECTOMY AND ADENOIDECTOMY      TUBAL LIGATION           No current facility-administered medications on file prior to encounter.     Current Outpatient Medications on File Prior to Encounter   Medication Sig Dispense Refill    albuterol (PROVENTIL/VENTOLIN HFA) 90 mcg/actuation inhaler Inhale 1 puff into the lungs every 4 (four) hours as needed.      ANORO ELLIPTA 62.5-25 mcg/actuation DsDv Inhale 1 puff into the lungs once daily.      mexiletine (MEXITIL) 200 MG Cap Take 200 mg by mouth every 8 (eight) hours.      omeprazole (PRILOSEC) 40 MG capsule Take 1 capsule (40 mg total) by mouth once daily. 30 capsule 5    traMADoL (ULTRAM) 50 mg tablet Take 50 mg by mouth every 6 (six) hours as needed.          nuclear Stress test on 3.14.23 revealed no evidence of myocardial ischemia or infarction    JUNE 2023   The left ventricle is normal in  size with mild concentric hypertrophy and normal systolic function.   The estimated ejection fraction is 60%.   Normal left ventricular diastolic function.   Normal right ventricular size with normal right ventricular systolic function.   Normal central venous pressure (3 mmHg).   The estimated PA systolic pressure is 17 mmHg.           Lab Results   Component Value Date    WBC 8.79 07/13/2023    HGB 14.8 07/13/2023    HCT 45.3 07/13/2023     07/13/2023    CHOL 316 03/14/2022    TRIG 276 03/14/2022    HDL 87 03/14/2022    ALT 24 07/13/2023    AST 27 07/13/2023     07/13/2023    K 4.5 07/13/2023    CREATININE 0.75 07/13/2023    BUN 12.2 07/13/2023    CO2 27 07/13/2023    TSH 3.5969 01/22/2021           Pre-op Assessment          Review of Systems         Anesthesia Plan  Type of Anesthesia, risks & benefits discussed:    Anesthesia Type: Gen Natural Airway  Intra-op Monitoring Plan: Standard ASA Monitors  Post Op Pain Control Plan: IV/PO Opioids PRN  (medical reason for not using multimodal pain management)  Induction:  IV  Informed Consent: Informed consent signed with the Patient and all parties understand the risks and agree with anesthesia plan.  All questions answered. Patient consented to blood products? No  ASA Score: 4  Day of Surgery Review of History & Physical: H&P Update referred to the surgeon/provider.    Ready For Surgery From Anesthesia Perspective.     .

## 2023-11-01 ENCOUNTER — ANESTHESIA (OUTPATIENT)
Dept: ENDOSCOPY | Facility: HOSPITAL | Age: 58
End: 2023-11-01
Payer: MEDICAID

## 2023-11-01 ENCOUNTER — HOSPITAL ENCOUNTER (OUTPATIENT)
Facility: HOSPITAL | Age: 58
Discharge: HOME | End: 2023-11-01
Attending: INTERNAL MEDICINE | Admitting: INTERNAL MEDICINE
Payer: MEDICAID

## 2023-11-01 VITALS
RESPIRATION RATE: 18 BRPM | OXYGEN SATURATION: 97 % | SYSTOLIC BLOOD PRESSURE: 107 MMHG | HEART RATE: 75 BPM | DIASTOLIC BLOOD PRESSURE: 82 MMHG | TEMPERATURE: 98 F

## 2023-11-01 DIAGNOSIS — R19.8 IRREGULAR BOWEL HABITS: ICD-10-CM

## 2023-11-01 DIAGNOSIS — R10.13 EPIGASTRIC ABDOMINAL PAIN: ICD-10-CM

## 2023-11-01 DIAGNOSIS — R68.81 EARLY SATIETY: ICD-10-CM

## 2023-11-01 PROCEDURE — 37000008 HC ANESTHESIA 1ST 15 MINUTES: Performed by: INTERNAL MEDICINE

## 2023-11-01 PROCEDURE — D9220A PRA ANESTHESIA: Mod: ,,, | Performed by: NURSE ANESTHETIST, CERTIFIED REGISTERED

## 2023-11-01 PROCEDURE — D9220A PRA ANESTHESIA: ICD-10-PCS | Mod: ,,, | Performed by: NURSE ANESTHETIST, CERTIFIED REGISTERED

## 2023-11-01 PROCEDURE — 43239 EGD BIOPSY SINGLE/MULTIPLE: CPT | Performed by: INTERNAL MEDICINE

## 2023-11-01 PROCEDURE — 25000003 PHARM REV CODE 250: Performed by: NURSE ANESTHETIST, CERTIFIED REGISTERED

## 2023-11-01 PROCEDURE — 88312 SPECIAL STAINS GROUP 1: CPT | Mod: TC | Performed by: INTERNAL MEDICINE

## 2023-11-01 PROCEDURE — 27201423 OPTIME MED/SURG SUP & DEVICES STERILE SUPPLY: Performed by: INTERNAL MEDICINE

## 2023-11-01 PROCEDURE — 63600175 PHARM REV CODE 636 W HCPCS: Performed by: SPECIALIST

## 2023-11-01 PROCEDURE — 88305 TISSUE EXAM BY PATHOLOGIST: CPT | Mod: TC | Performed by: INTERNAL MEDICINE

## 2023-11-01 RX ORDER — DEXMEDETOMIDINE HYDROCHLORIDE 100 UG/ML
INJECTION, SOLUTION INTRAVENOUS
Status: DISCONTINUED | OUTPATIENT
Start: 2023-11-01 | End: 2023-11-01

## 2023-11-01 RX ORDER — PROPOFOL 10 MG/ML
VIAL (ML) INTRAVENOUS
Status: DISCONTINUED | OUTPATIENT
Start: 2023-11-01 | End: 2023-11-01

## 2023-11-01 RX ORDER — LIDOCAINE HYDROCHLORIDE 20 MG/ML
INJECTION INTRAVENOUS
Status: DISCONTINUED | OUTPATIENT
Start: 2023-11-01 | End: 2023-11-01

## 2023-11-01 RX ORDER — SODIUM CHLORIDE, SODIUM LACTATE, POTASSIUM CHLORIDE, CALCIUM CHLORIDE 600; 310; 30; 20 MG/100ML; MG/100ML; MG/100ML; MG/100ML
INJECTION, SOLUTION INTRAVENOUS CONTINUOUS
Status: DISCONTINUED | OUTPATIENT
Start: 2023-11-01 | End: 2023-11-01 | Stop reason: HOSPADM

## 2023-11-01 RX ADMIN — DEXMEDETOMIDINE 10 MCG: 200 INJECTION, SOLUTION INTRAVENOUS at 10:11

## 2023-11-01 RX ADMIN — SODIUM CHLORIDE, POTASSIUM CHLORIDE, SODIUM LACTATE AND CALCIUM CHLORIDE: 600; 310; 30; 20 INJECTION, SOLUTION INTRAVENOUS at 09:11

## 2023-11-01 RX ADMIN — Medication 50 MG: at 10:11

## 2023-11-01 RX ADMIN — LIDOCAINE HYDROCHLORIDE 50 MG: 20 INJECTION INTRAVENOUS at 10:11

## 2023-11-01 NOTE — H&P
EGD History and Physical    Patient Name: Cheryl Bauer  MRN: [id number]  : 1965  Date of Procedure:  2023  Referring Physician: Karma Wong FNP  Primary Physician: Poonam Javier MD  Procedure Physician: Lacy Miles MD, MPH     Procedure - EGD  ASA - per anesthesia  Mallampati - per anesthesia  History of Anesthesia problems - no  Family history Anesthesia problems -  no   Plan of anesthesia - General    Diagnosis:  epigastric pain  Chief Complaint: Same as above    HPI: Patient is an 58 y.o. female is here for the above.     Ms. Bauer is a 58 year old CF with hypertension, multinodular goiter s/p right thyroidectomy and isthmus, myotonic muscular dystrophy, and colon polyps here for an EGD for epigastric pain.     She was initially seen 2021 and referred for alternating bowel habits at that time.  She reported intermittent epigastric abdominal pain for the past 1-1/2 months described as aching, cramping, and nonradiating.  The pain was worsened with eating and she was unable to identify specific food triggers.  She denied relieving factors.   She reported fluctuating bowel habits between loose to watery stools 5-6 times per day for 2-3 consecutive days followed by constipation with frequent straining and occasional bright red rectal bleeding with bowel movements noted on the tissue after wiping for 3 to 5 days before the diarrhea started again.      2021 revealed fecal calprotectin 29, pancreatic elastase 371, and negative stool culture, ova and parasite, Giardia, fecal leukocyte, rotavirus, and Cryptosporidium.       Abdominal ultrasound 2021 revealed diffuse hepatic steatosis and normal gallbladder.       She underwent EGD and colonoscopy 2021.  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  Pathology revealed mild chronic gastritis with  no dysplasia identified and no Helicobacter pylori organisms identified.      Colonoscopy revealed hemorrhoids found on perianal exam, examined portion of ileum normal, 12 adenomatous polyps  She was recommended repeat colonoscopy in 3 years.    She presented in follow-up for continued symptoms.  She reports constant epigastric pain not exacerbated by eating or drinking and relieved with cold foods such as milkshake.  She denies significant nausea or vomiting.  No heartburn, reflux, or dysphagia on pantoprazole 40 mg daily.  Bowel movements continue to be mainly loose 3-5 times a day (unchanged).  She denies any melena or rectal bleeding.         She underwent colonoscopy August 2, 2017 with findings of tubulovillous adenomatous polyp at the cecum with no evidence of malignancy, tubular adenomatous polyp at 30 cm with no evidence of malignancy, tubular adenomatous polyp at 43 cm with no evidence of malignancy.  Three cecal polyps were noted with the largest measuring 1.5 cm.  Biopsies were obtained of the 2 smaller polyps, and the 1.5 cm polyp was removed in its entirety by snare.  She was recommended repeat colonoscopy in 3 years.  She denies regular NSAID use or use of blood thinners.  She denies tobacco use and drinks 5-6 beers per day.  She is a former smoker.  She denies a family history of IBD, colon polyps, or colon cancer.      Last colonoscopy: 2021  Family history: denies  Anticoagulation: none    ROS:  Constitutional: No fevers, chills, No weight loss  CV: No chest pain  Pulm: No cough, No shortness of breath  GI: see HPI    Medical History:   Past Medical History:   Diagnosis Date    COPD (chronic obstructive pulmonary disease)     Hypertension     Muscular dystrophy          Surgical History:   Past Surgical History:   Procedure Laterality Date    CARPAL TUNNEL RELEASE Left     HYSTERECTOMY      KNEE SURGERY Left     THYROIDECTOMY Right     TONSILLECTOMY AND ADENOIDECTOMY      TUBAL LIGATION          Family History:   Family History   Problem Relation Age of Onset    Hypertension Father     Heart failure Father     Heart disease Father     Heart attack Father 68    Diabetes Father    .    Social History:   Social History     Socioeconomic History    Marital status:    Tobacco Use    Smoking status: Former     Current packs/day: 0.00     Types: Cigarettes     Quit date: 2009     Years since quittin.8    Smokeless tobacco: Never    Tobacco comments:     Quit 15 years ago   Substance and Sexual Activity    Alcohol use: Yes     Alcohol/week: 1.0 standard drink of alcohol     Types: 1 Cans of beer per week     Comment: daily    Drug use: Never    Sexual activity: Yes       Review of patient's allergies indicates:   Allergen Reactions    Dilantin [phenytoin sodium extended] Hives       Medications:   Medications Prior to Admission   Medication Sig Dispense Refill Last Dose    ANORO ELLIPTA 62.5-25 mcg/actuation DsDv Inhale 1 puff into the lungs once daily.   2023    lisinopriL (PRINIVIL,ZESTRIL) 5 MG tablet Take 1 tablet (5 mg total) by mouth once daily. 90 tablet 1 10/31/2023    mexiletine (MEXITIL) 200 MG Cap Take 200 mg by mouth every 8 (eight) hours.   10/31/2023    omega-3 fatty acids/fish oil (FISH OIL-OMEGA-3 FATTY ACIDS) 300-1,000 mg capsule Take 3 capsules by mouth 2 (two) times a day.   10/31/2023    omeprazole (PRILOSEC) 40 MG capsule Take 1 capsule (40 mg total) by mouth once daily. 30 capsule 5 10/31/2023    paroxetine (PAXIL) 10 MG tablet Take 1 tablet (10 mg total) by mouth once daily. 90 tablet 1 10/31/2023    traMADoL (ULTRAM) 50 mg tablet Take 50 mg by mouth every 6 (six) hours as needed.   Past Week    albuterol (PROVENTIL/VENTOLIN HFA) 90 mcg/actuation inhaler Inhale 1 puff into the lungs every 4 (four) hours as needed.   More than a month         Physical Exam:    Vital Signs:   Vitals:    23 0843   BP: (!) 151/85   Pulse: 88   Resp: 18   Temp: 97.9 °F (36.6 °C)  "    BP (!) 151/85 (BP Location: Left arm, Patient Position: Lying)   Pulse 88   Temp 97.9 °F (36.6 °C) (Oral)   Resp 18   SpO2 96%   Breastfeeding No     General:          Well appearing in no acute distress  Lungs: Clear to auscultation bilaterally, respirations unlabored  Heart: Regular rate and rhythm, S1 and S2 normal, no obvious murmurs  Abdomen:         Soft, non-tender, bowel sounds normal, no masses, no organomegaly        Labs:  Lab Results   Component Value Date    WBC 8.79 07/13/2023    HGB 14.8 07/13/2023    HCT 45.3 07/13/2023    MCV 97.4 (H) 07/13/2023     07/13/2023     No results found for: "INR", "PT", "PTT"  Lab Results   Component Value Date     07/13/2023    K 4.5 07/13/2023    CO2 27 07/13/2023    BUN 12.2 07/13/2023    CREATININE 0.75 07/13/2023    LABPROT 7.1 07/13/2023    ALBUMIN 3.7 07/13/2023    BILITOT 0.3 07/13/2023    ALKPHOS 81 07/13/2023    ALT 24 07/13/2023    AST 27 07/13/2023       Assessment and Plan:     History reviewed, vital signs satisfactory, cardiopulmonary status satisfactory.  I have explained the sedation options, risks, benefits, and alternatives of this endoscopic procedure to the patient including but not limited to bleeding, inflammation, infection, perforation, and death.  All questions were answered and the patient consented to proceed with procedure as planned.   The patient is deemed an appropriate candidate for the sedation as planned.      Lacy Miles MD, MPH   of Clinical Medicine  Gastroenterology and Hepatology  [facility]    11/1/2023  9:47 AM     "

## 2023-11-01 NOTE — PROVATION PATIENT INSTRUCTIONS
Discharge Summary/Instructions after an Endoscopic Procedure  Patient Name: Cheryl Bauer  Patient MRN: [id number]  Patient YOB: 1965 Wednesday, November 1, 2023  Lacy Miles MD  Dear patient,  As a result of recent federal legislation (The Federal Cures Act), you may   receive lab or pathology results from your procedure in your [facility]   account before your physician is able to contact you. Your physician or   their representative will relay the results to you with their   recommendations at their soonest availability.  Thank you,  RESTRICTIONS:  During your procedure today, you received medications for sedation.  These   medications may affect your judgment, balance and coordination.  Therefore,   for 24 hours, you have the following restrictions:   - DO NOT drive a car, operate machinery, make legal/financial decisions,   sign important papers or drink alcohol.    ACTIVITY:  Today: no heavy lifting, straining or running due to procedural   sedation/anesthesia.  The following day: return to full activity including work.  DIET:  Eat and drink normally unless instructed otherwise.     TREATMENT FOR COMMON SIDE EFFECTS:  - Mild abdominal pain, nausea, belching, bloating or excessive gas:  rest,   eat lightly and use a heating pad.  - Sore Throat: treat with throat lozenges and/or gargle with warm salt   water.  - Because air was used during the procedure, expelling large amounts of air   from your rectum or belching is normal.  - If a bowel prep was taken, you may not have a bowel movement for 1-3 days.    This is normal.  SYMPTOMS TO WATCH FOR AND REPORT TO YOUR PHYSICIAN:  1. Abdominal pain or bloating, other than gas cramps.  2. Chest pain.  3. Back pain.  4. Signs of infection such as: chills or fever occurring within 24 hours   after the procedure.  5. Rectal bleeding, which would show as bright red, maroon, or black stools.   (A tablespoon of blood from the rectum is not serious,  especially if   hemorrhoids are present.)  6. Vomiting.  7. Weakness or dizziness.  GO DIRECTLY TO THE NEAREST EMERGENCY ROOM IF YOU HAVE ANY OF THE FOLLOWING:      Difficulty breathing              Chills and/or fever over 101 F   Persistent vomiting and/or vomiting blood   Severe abdominal pain   Severe chest pain   Black, tarry stools   Bleeding- more than one tablespoon   Any other symptom or condition that you feel may need urgent attention  Your doctor recommends these additional instructions:  If any biopsies were taken, your doctors clinic will contact you in 1 to 2   weeks with any results.  - Patient has a contact number available for emergencies.  The signs and   symptoms of potential delayed complications were discussed with the   patient.  Return to normal activities tomorrow.  Written discharge   instructions were provided to the patient.   - Discharge patient to home.   - Resume previous diet.   - Continue present medications.   - Await pathology results.  For questions, problems or results please call your physician - Lacy Miles MD at Work:  [phone], Work:  [phone].  [facility] , EMERGENCY ROOM PHONE NUMBER: [phone]  IF A COMPLICATION OR EMERGENCY SITUATION ARISES AND YOU ARE UNABLE TO REACH   YOUR PHYSICIAN - GO DIRECTLY TO THE EMERGENCY ROOM.  Lacy Miles MD  11/1/2023 10:32:53 AM  This report has been verified and signed electronically.  Dear patient,  As a result of recent federal legislation (The Federal Cures Act), you may   receive lab or pathology results from your procedure in your [facility]   account before your physician is able to contact you. Your physician or   their representative will relay the results to you with their   recommendations at their soonest availability.  Thank you,  PROVATION

## 2023-11-01 NOTE — TRANSFER OF CARE
Anesthesia Transfer of Care Note    Patient: Cheryl Bauer    Procedure(s) Performed: Procedure(s) (LRB):  EGD, WITH CLOSED BIOPSY (N/A)    Patient location: GI    Anesthesia Type: general    Post pain: adequate analgesia    Post assessment: no apparent anesthetic complications    Post vital signs: stable    Level of consciousness: awake    Nausea/Vomiting: no nausea/vomiting    Complications: none    Transfer of care protocol was followed      Last vitals:

## 2023-11-01 NOTE — ANESTHESIA POSTPROCEDURE EVALUATION
Anesthesia Post Evaluation    Patient: Cheryl Bauer    Procedure(s) Performed: Procedure(s) (LRB):  EGD, WITH CLOSED BIOPSY (N/A)    Final Anesthesia Type: general      Patient location during evaluation: GI PACU  Patient participation: Yes- Able to Participate  Level of consciousness: awake and alert  Pain management: adequate  Airway patency: patent      Anesthetic complications: no      Cardiovascular status: hemodynamically stable  Respiratory status: unassisted, room air and spontaneous ventilation  Hydration status: euvolemic  Follow-up not needed.              No case tracking events are documented in the log.      Pain/Jennifer Score: Jennifer Score: 9 (11/1/2023 10:11 AM)

## 2023-11-02 LAB
DHEA SERPL-MCNC: NORMAL
ESTROGEN SERPL-MCNC: NORMAL PG/ML
INSULIN SERPL-ACNC: NORMAL U[IU]/ML
LAB AP CLINICAL INFORMATION: NORMAL
LAB AP GROSS DESCRIPTION: NORMAL
LAB AP REPORT FOOTNOTES: NORMAL
T3RU NFR SERPL: NORMAL %

## 2023-11-03 ENCOUNTER — TELEPHONE (OUTPATIENT)
Dept: ENDOSCOPY | Facility: HOSPITAL | Age: 58
End: 2023-11-03
Payer: MEDICAID

## 2023-11-03 NOTE — TELEPHONE ENCOUNTER
----- Message from Lacy Miles MD sent at 11/3/2023  8:24 AM CDT -----  Please let patient know that stomach biopsies showed mild inflammation but no evidence for H pylori bacteria (which is a bacteria that can cause inflammation and ulcers in the stomach).  Recommend continuing current medications.   Please call patient with results.

## 2023-12-12 ENCOUNTER — HOSPITAL ENCOUNTER (OUTPATIENT)
Dept: CARDIOLOGY | Facility: HOSPITAL | Age: 58
Discharge: HOME | End: 2023-12-12
Attending: NURSE PRACTITIONER
Payer: MEDICAID

## 2023-12-12 VITALS
DIASTOLIC BLOOD PRESSURE: 81 MMHG | BODY MASS INDEX: 25.27 KG/M2 | SYSTOLIC BLOOD PRESSURE: 122 MMHG | WEIGHT: 161 LBS | HEIGHT: 67 IN

## 2023-12-12 DIAGNOSIS — G71.11 MYOTONIC DYSTROPHY: ICD-10-CM

## 2023-12-12 DIAGNOSIS — R06.02 SHORTNESS OF BREATH: ICD-10-CM

## 2023-12-12 LAB
AV INDEX (PROSTH): 0.56
AV MEAN GRADIENT: 3 MMHG
AV PEAK GRADIENT: 7 MMHG
AV VALVE AREA BY VELOCITY RATIO: 1.72 CM²
AV VALVE AREA: 1.7 CM²
AV VELOCITY RATIO: 0.57
BSA FOR ECHO PROCEDURE: 1.86 M2
CV ECHO LV RWT: 0.52 CM
DOP CALC AO PEAK VEL: 1.28 M/S
DOP CALC AO VTI: 28.8 CM
DOP CALC LVOT AREA: 3 CM2
DOP CALC LVOT DIAMETER: 1.96 CM
DOP CALC LVOT PEAK VEL: 0.73 M/S
DOP CALC LVOT STROKE VOLUME: 48.85 CM3
DOP CALC MV VTI: 11.4 CM
DOP CALCLVOT PEAK VEL VTI: 16.2 CM
E WAVE DECELERATION TIME: 149.71 MSEC
E/A RATIO: 0.98
ECHO LV POSTERIOR WALL: 1.1 CM (ref 0.6–1.1)
FRACTIONAL SHORTENING: 31 % (ref 28–44)
HR MV ECHO: 69 BPM
INTERVENTRICULAR SEPTUM: 0.94 CM (ref 0.6–1.1)
LEFT ATRIUM SIZE: 3.43 CM
LEFT ATRIUM VOLUME INDEX MOD: 9 ML/M2
LEFT ATRIUM VOLUME MOD: 16.52 CM3
LEFT INTERNAL DIMENSION IN SYSTOLE: 2.93 CM (ref 2.1–4)
LEFT VENTRICLE DIASTOLIC VOLUME INDEX: 44.2 ML/M2
LEFT VENTRICLE DIASTOLIC VOLUME: 81.33 ML
LEFT VENTRICLE MASS INDEX: 78 G/M2
LEFT VENTRICLE SYSTOLIC VOLUME INDEX: 17.9 ML/M2
LEFT VENTRICLE SYSTOLIC VOLUME: 32.95 ML
LEFT VENTRICULAR INTERNAL DIMENSION IN DIASTOLE: 4.26 CM (ref 3.5–6)
LEFT VENTRICULAR MASS: 144.32 G
LV LATERAL E/E' RATIO: 5.11 M/S
LVOT MG: 1.22 MMHG
LVOT MV: 0.53 CM/S
MV MEAN GRADIENT: 1 MMHG
MV PEAK A VEL: 0.47 M/S
MV PEAK E VEL: 0.46 M/S
MV PEAK GRADIENT: 1 MMHG
MV VALVE AREA BY CONTINUITY EQUATION: 4.29 CM2
OHS LV EJECTION FRACTION SIMPSONS BIPLANE MOD: 65 %
PISA MRMAX VEL: 4.12 M/S
PISA TR MAX VEL: 2.69 M/S
RA MAJOR: 3.14 CM
RA PRESSURE ESTIMATED: 3 MMHG
RV TB RVSP: 6 MMHG
TDI LATERAL: 0.09 M/S
TR MAX PG: 29 MMHG
TRICUSPID ANNULAR PLANE SYSTOLIC EXCURSION: 1.82 CM
TV REST PULMONARY ARTERY PRESSURE: 32 MMHG
Z-SCORE OF LEFT VENTRICULAR DIMENSION IN END DIASTOLE: -1.81
Z-SCORE OF LEFT VENTRICULAR DIMENSION IN END SYSTOLE: -0.58

## 2023-12-12 PROCEDURE — 93306 TTE W/DOPPLER COMPLETE: CPT

## 2023-12-21 ENCOUNTER — HOSPITAL ENCOUNTER (OUTPATIENT)
Dept: RADIOLOGY | Facility: HOSPITAL | Age: 58
Discharge: HOME | End: 2023-12-21
Attending: FAMILY MEDICINE
Payer: MEDICAID

## 2023-12-21 DIAGNOSIS — Z12.31 ENCOUNTER FOR SCREENING MAMMOGRAM FOR MALIGNANT NEOPLASM OF BREAST: ICD-10-CM

## 2023-12-21 PROCEDURE — 77067 MAMMO DIGITAL SCREENING BILAT WITH TOMO: ICD-10-PCS | Mod: 26,,, | Performed by: STUDENT IN AN ORGANIZED HEALTH CARE EDUCATION/TRAINING PROGRAM

## 2023-12-21 PROCEDURE — 77067 SCR MAMMO BI INCL CAD: CPT | Mod: 26,,, | Performed by: STUDENT IN AN ORGANIZED HEALTH CARE EDUCATION/TRAINING PROGRAM

## 2023-12-21 PROCEDURE — 77063 BREAST TOMOSYNTHESIS BI: CPT | Mod: 26,,, | Performed by: STUDENT IN AN ORGANIZED HEALTH CARE EDUCATION/TRAINING PROGRAM

## 2023-12-21 PROCEDURE — 77067 SCR MAMMO BI INCL CAD: CPT | Mod: TC

## 2023-12-21 PROCEDURE — 77063 MAMMO DIGITAL SCREENING BILAT WITH TOMO: ICD-10-PCS | Mod: 26,,, | Performed by: STUDENT IN AN ORGANIZED HEALTH CARE EDUCATION/TRAINING PROGRAM

## 2024-01-11 DIAGNOSIS — R68.81 EARLY SATIETY: ICD-10-CM

## 2024-01-11 DIAGNOSIS — R10.13 EPIGASTRIC ABDOMINAL PAIN: ICD-10-CM

## 2024-01-11 RX ORDER — OMEPRAZOLE 40 MG/1
40 CAPSULE, DELAYED RELEASE ORAL
Qty: 30 CAPSULE | Refills: 0 | Status: SHIPPED | OUTPATIENT
Start: 2024-01-11 | End: 2024-02-14

## 2024-01-11 NOTE — TELEPHONE ENCOUNTER
Please see the attached refill request.  Patient last appt was 08/2023 but does not have a future appt.

## 2024-02-13 DIAGNOSIS — R68.81 EARLY SATIETY: ICD-10-CM

## 2024-02-13 DIAGNOSIS — R10.13 EPIGASTRIC ABDOMINAL PAIN: ICD-10-CM

## 2024-02-14 RX ORDER — OMEPRAZOLE 40 MG/1
40 CAPSULE, DELAYED RELEASE ORAL
Qty: 30 CAPSULE | Refills: 0 | Status: SHIPPED | OUTPATIENT
Start: 2024-02-14 | End: 2024-03-18

## 2024-02-14 NOTE — TELEPHONE ENCOUNTER
Please see the attached refill request. Patient was last seen 07/13/2023 and does not have an upcoming appt.

## 2024-03-12 ENCOUNTER — HOSPITAL ENCOUNTER (OUTPATIENT)
Dept: RADIOLOGY | Facility: HOSPITAL | Age: 59
Discharge: HOME | End: 2024-03-12
Attending: NURSE PRACTITIONER
Payer: MEDICAID

## 2024-03-12 ENCOUNTER — HOSPITAL ENCOUNTER (OUTPATIENT)
Dept: CARDIOLOGY | Facility: HOSPITAL | Age: 59
Discharge: HOME | End: 2024-03-12
Attending: NURSE PRACTITIONER
Payer: MEDICAID

## 2024-03-12 VITALS
RESPIRATION RATE: 20 BRPM | BODY MASS INDEX: 25.26 KG/M2 | WEIGHT: 160.94 LBS | SYSTOLIC BLOOD PRESSURE: 116 MMHG | HEIGHT: 67 IN | HEART RATE: 75 BPM | DIASTOLIC BLOOD PRESSURE: 78 MMHG

## 2024-03-12 DIAGNOSIS — R07.89 OTHER CHEST PAIN: ICD-10-CM

## 2024-03-12 DIAGNOSIS — G71.11 MYOTONIC DYSTROPHY: ICD-10-CM

## 2024-03-12 LAB
OHS QRS DURATION: 110 MS
OHS QTC CALCULATION: 506 MS

## 2024-03-12 PROCEDURE — 93017 CV STRESS TEST TRACING ONLY: CPT

## 2024-03-12 PROCEDURE — 78452 HT MUSCLE IMAGE SPECT MULT: CPT

## 2024-03-12 PROCEDURE — A9502 TC99M TETROFOSMIN: HCPCS | Performed by: NURSE PRACTITIONER

## 2024-03-12 PROCEDURE — 63600175 PHARM REV CODE 636 W HCPCS: Performed by: NURSE PRACTITIONER

## 2024-03-12 RX ORDER — REGADENOSON 0.08 MG/ML
0.4 INJECTION, SOLUTION INTRAVENOUS
Status: COMPLETED | OUTPATIENT
Start: 2024-03-12 | End: 2024-03-12

## 2024-03-12 RX ADMIN — REGADENOSON 0.4 MG: 0.08 INJECTION, SOLUTION INTRAVENOUS at 07:03

## 2024-03-12 RX ADMIN — TETROFOSMIN 31.9 MILLICURIE: 1.38 INJECTION, POWDER, LYOPHILIZED, FOR SOLUTION INTRAVENOUS at 08:03

## 2024-03-12 RX ADMIN — TETROFOSMIN 10.8 MILLICURIE: 1.38 INJECTION, POWDER, LYOPHILIZED, FOR SOLUTION INTRAVENOUS at 07:03

## 2024-03-13 LAB
CV STRESS BASE HR: 75 BPM
DIASTOLIC BLOOD PRESSURE: 78 MMHG
NUC REST EJECTION FRACTION: 86
NUC STRESS EJECTION FRACTION: 87 %
OHS CV CPX 85 PERCENT MAX PREDICTED HEART RATE MALE: 138
OHS CV CPX ESTIMATED METS: 1
OHS CV CPX MAX PREDICTED HEART RATE: 162
OHS CV CPX PATIENT IS FEMALE: 1
OHS CV CPX PATIENT IS MALE: 0
OHS CV CPX PEAK DIASTOLIC BLOOD PRESSURE: 81 MMHG
OHS CV CPX PEAK HEAR RATE: 104 BPM
OHS CV CPX PEAK RATE PRESSURE PRODUCT: NORMAL
OHS CV CPX PEAK SYSTOLIC BLOOD PRESSURE: 124 MMHG
OHS CV CPX PERCENT MAX PREDICTED HEART RATE ACHIEVED: 67
OHS CV CPX RATE PRESSURE PRODUCT PRESENTING: 8700
STRESS ECHO POST EXERCISE DUR MIN: 0 MINUTES
STRESS ECHO POST EXERCISE DUR SEC: 46 SECONDS
SYSTOLIC BLOOD PRESSURE: 116 MMHG

## 2024-03-17 DIAGNOSIS — R10.13 EPIGASTRIC ABDOMINAL PAIN: ICD-10-CM

## 2024-03-17 DIAGNOSIS — R68.81 EARLY SATIETY: ICD-10-CM

## 2024-03-18 RX ORDER — OMEPRAZOLE 40 MG/1
40 CAPSULE, DELAYED RELEASE ORAL
Qty: 30 CAPSULE | Refills: 1 | Status: SHIPPED | OUTPATIENT
Start: 2024-03-18 | End: 2024-05-27

## 2024-04-26 ENCOUNTER — OFFICE VISIT (OUTPATIENT)
Dept: CARDIOLOGY | Facility: CLINIC | Age: 59
End: 2024-04-26
Payer: MEDICAID

## 2024-04-26 VITALS
WEIGHT: 156.38 LBS | SYSTOLIC BLOOD PRESSURE: 106 MMHG | TEMPERATURE: 98 F | HEART RATE: 76 BPM | DIASTOLIC BLOOD PRESSURE: 67 MMHG | OXYGEN SATURATION: 98 % | BODY MASS INDEX: 24.54 KG/M2 | HEIGHT: 67 IN | RESPIRATION RATE: 20 BRPM

## 2024-04-26 DIAGNOSIS — E78.2 MIXED HYPERLIPIDEMIA: ICD-10-CM

## 2024-04-26 DIAGNOSIS — I44.7 LEFT BUNDLE BRANCH BLOCK: Primary | ICD-10-CM

## 2024-04-26 DIAGNOSIS — R07.89 OTHER CHEST PAIN: ICD-10-CM

## 2024-04-26 DIAGNOSIS — R06.09 DOE (DYSPNEA ON EXERTION): ICD-10-CM

## 2024-04-26 DIAGNOSIS — I10 HTN (HYPERTENSION), BENIGN: ICD-10-CM

## 2024-04-26 PROCEDURE — 99214 OFFICE O/P EST MOD 30 MIN: CPT | Mod: S$PBB,,, | Performed by: NURSE PRACTITIONER

## 2024-04-26 PROCEDURE — 3008F BODY MASS INDEX DOCD: CPT | Mod: CPTII,,, | Performed by: NURSE PRACTITIONER

## 2024-04-26 PROCEDURE — 4010F ACE/ARB THERAPY RXD/TAKEN: CPT | Mod: CPTII,,, | Performed by: NURSE PRACTITIONER

## 2024-04-26 PROCEDURE — 1160F RVW MEDS BY RX/DR IN RCRD: CPT | Mod: CPTII,,, | Performed by: NURSE PRACTITIONER

## 2024-04-26 PROCEDURE — 99214 OFFICE O/P EST MOD 30 MIN: CPT | Mod: PBBFAC | Performed by: NURSE PRACTITIONER

## 2024-04-26 PROCEDURE — 1159F MED LIST DOCD IN RCRD: CPT | Mod: CPTII,,, | Performed by: NURSE PRACTITIONER

## 2024-04-26 PROCEDURE — 3078F DIAST BP <80 MM HG: CPT | Mod: CPTII,,, | Performed by: NURSE PRACTITIONER

## 2024-04-26 PROCEDURE — 3074F SYST BP LT 130 MM HG: CPT | Mod: CPTII,,, | Performed by: NURSE PRACTITIONER

## 2024-04-26 RX ORDER — NITROGLYCERIN 0.4 MG/1
0.4 TABLET SUBLINGUAL EVERY 5 MIN PRN
Qty: 25 TABLET | Refills: 3 | Status: SHIPPED | OUTPATIENT
Start: 2024-04-26 | End: 2025-04-26

## 2024-04-26 NOTE — PATIENT INSTRUCTIONS
SL nitro PRN CP   Echocardiogram. Due in June 2024  Nuclear Stress Test Due in March 2025. Will order at later date   Follow up in cardiology clinic in 6 months or sooner if needed   Follow up with PCP as directed

## 2024-04-26 NOTE — PROGRESS NOTES
CHIEF COMPLAINT:   Chief Complaint   Patient presents with    Follow-up     Patient c/o having occ.aching chest pain with sweating lasting 5-10 minutes.                   Review of patient's allergies indicates:   Allergen Reactions    Dilantin [phenytoin sodium extended] Hives                                          HPI:  Cheryl Bauer 59 y.o. female with PMH of myotonic dystrophy, multinodular goiter, left bundle branch block, hypertension, hyperlipidemia, COPD, (wears home oxygen 2 LNC qhs), colon polyps and former tobacco use who presents to cardiology clinic for results of testing and ongoing care. Patient is followed in cardiology clinic due to her history of myotonic dystrophy.  She follows a neurologist/myotonic dystrophy physician in [place], Dr. Haines, who recommends a EKG and Echocardiogram every 6 months along with an annual Nuclear Stress Test.  Latest Nuclear Stress test on 3.14.24 was a Normal myocardial perfusion scan with no evidence of myocardial ischemia. Lastest Echocardiogram obtained on 12.12.23 revealed a preserved EF of 65%, Normal diastolic function and no significant valvular abnormalities.  At her last clinic visit, the patient endorses occasional atypical chest pain and stable shortness of breath with exertion.    She presents to clinic today continuing to endorse occasional episodes of left-sided chest pain that occurs mostly at rest.  She describes the chest pain as a left-sided aching pain, nonradiating with occasional episodes of associated diaphoresis.  She states the pain may last 5-10 minutes before eventually self terminating.  She denies any exertional chest pain, pressure, tightness or heaviness. She endorses stable shortness of breath that has not progressed or worsened since last visit.    The patient's activity and ADLs remain unchanged.  She continues to utilize a walker at home and a wheelchair for longer distances.  She denies any orthopnea, PND,  claudication or syncope, but reports occasional dependent edema that resolves with leg elevation.  She endorses compliance with her current meds and is currently tolerating without issue.    Denies smoking. Quit 12 years prior. Used to smoke 1 pack/day x 20 years.  Drinks 3 natural light beers per day  Denies drug use                                    CARDIAC TESTING:   Nuclear Stress Test 3.12.24    Normal myocardial perfusion scan. There is no evidence of myocardial ischemia or infarction.    The gated perfusion images showed an ejection fraction of 86% at rest. The gated perfusion images showed an ejection fraction of 87% post stress.    The patient reported no chest pain during the stress test.    There were no arrhythmias during stress.     The nuclear stress test is  good quality.    On the nuclear stress test the EF is supranormal.  If the patient has an echo, the EF on the echo is considered more accurate.         The patient has a low risk nuclear stress test      Nuclear stress test indicates no ischemia.    ECHO 12.12.23    Left Ventricle: The left ventricle is normal in size. Normal wall thickness. There is normal systolic function. Biplane (2D) method of discs ejection fraction is 65%.    Right Ventricle: Normal right ventricular cavity size. Systolic function is mildly reduced.    IVC/SVC: Normal venous pressure at 3 mmHg    Nuclear Stress Test - Normal myocardial perfusion scan.  There is no evidence of myocardial ischemia or infarction (3.14.23)  Nuclear Stress Test - Normal myocardial perfusion study with no evidence of ischemia or scar (3.28.22)  Stress Echocardiogram in May 2019 which was negative for inducible ischemia.                 ECHO EF- 60%, normal left ventricular diastolic function and no significant valvular abnormalities (6.14.23)  ECHO -EF- 59%, PASP is estimated to be normal, no significant valvular abnormalities, in comparison to previous echocardiogram performed on 10/26/2021  there were no significant changes.   (4.14.22)  Echocardiogram 10/2021 EF 50-55%, mildly elevated PASP  Echocardiogram from 3/10/2021 with EF 50-55%    30 day event monitor - Sinus rhythm with average HR 84 bpm, minimum 60 bpm, and max 137 bpm.  There were no significant pauses, VT, SVT or AFib.  4. Reported episodes the patient was noted to be in sinus. (April 2022)  30-day event monitor from December 2020 with no significant arrhythmias.  IDALIA testing in November 2019 which revealed no evidence of significant arterial insufficiency.                                                                                                                                                                                                                                                            Patient Active Problem List   Diagnosis    Shortness of breath    Left bundle branch block    HTN (hypertension), benign    Hyperlipidemia    Epigastric abdominal pain    Early satiety    Irregular bowel habits    Personal history of colonic polyps     Past Surgical History:   Procedure Laterality Date    CARPAL TUNNEL RELEASE Left     EGD, WITH CLOSED BIOPSY N/A 11/1/2023    Procedure: EGD, WITH CLOSED BIOPSY;  Surgeon: Lacy Miles MD;  Location: [facility] ENDOSCOPY;  Service: Gastroenterology;  Laterality: N/A;    HYSTERECTOMY      KNEE SURGERY Left     THYROIDECTOMY Right     TONSILLECTOMY AND ADENOIDECTOMY      TUBAL LIGATION       Social History     Socioeconomic History    Marital status:    Tobacco Use    Smoking status: Former     Current packs/day: 0.00     Types: Cigarettes     Quit date: 01/2009     Years since quitting: 15.3    Smokeless tobacco: Never    Tobacco comments:     Quit 15 years ago   Substance and Sexual Activity    Alcohol use: Yes     Alcohol/week: 1.0 standard drink of alcohol     Types: 1 Cans of beer per week     Comment: daily    Drug use: Never    Sexual activity: Yes        Family History    Problem Relation Name Age of Onset    Hypertension Father      Heart failure Father      Heart disease Father      Heart attack Father  68    Diabetes Father           Current Outpatient Medications:     albuterol (PROVENTIL/VENTOLIN HFA) 90 mcg/actuation inhaler, Inhale 1 puff into the lungs every 4 (four) hours as needed., Disp: , Rfl:     ANORO ELLIPTA 62.5-25 mcg/actuation DsDv, Inhale 1 puff into the lungs once daily., Disp: , Rfl:     lisinopriL (PRINIVIL,ZESTRIL) 5 MG tablet, Take 1 tablet (5 mg total) by mouth once daily., Disp: 90 tablet, Rfl: 1    mexiletine (MEXITIL) 200 MG Cap, Take 200 mg by mouth every 8 (eight) hours., Disp: , Rfl:     omega-3 fatty acids/fish oil (FISH OIL-OMEGA-3 FATTY ACIDS) 300-1,000 mg capsule, Take 3 capsules by mouth 2 (two) times a day., Disp: , Rfl:     omeprazole (PRILOSEC) 40 MG capsule, Take 1 capsule by mouth once daily, Disp: 30 capsule, Rfl: 1    paroxetine (PAXIL) 10 MG tablet, Take 1 tablet (10 mg total) by mouth once daily., Disp: 90 tablet, Rfl: 1    traMADoL (ULTRAM) 50 mg tablet, Take 50 mg by mouth every 6 (six) hours as needed., Disp: , Rfl:      ROS:                                                                                                                                                                             Review of Systems   Constitutional: Negative.    HENT: Negative.     Eyes: Negative.    Respiratory:  Positive for shortness of breath.    Cardiovascular:  Positive for chest pain.   Gastrointestinal: Negative.    Genitourinary: Negative.    Musculoskeletal: Negative.    Skin: Negative.    Neurological: Negative.    Endo/Heme/Allergies: Negative.    Psychiatric/Behavioral: Negative.          There were no vitals taken for this visit.   PE:  Physical Exam  HENT:      Head: Normocephalic.      Nose: Nose normal.   Eyes:      Pupils: Pupils are equal, round, and reactive to light.   Cardiovascular:      Rate and Rhythm: Normal rate and regular  rhythm.   Pulmonary:      Effort: Pulmonary effort is normal.   Abdominal:      General: Abdomen is flat. Bowel sounds are normal.      Palpations: Abdomen is soft.   Musculoskeletal:         General: Normal range of motion.      Cervical back: Normal range of motion.   Skin:     General: Skin is warm.   Neurological:      General: No focal deficit present.      Mental Status: She is alert.   Psychiatric:         Mood and Affect: Mood normal.                ASSESSMENT/PLAN:  Left bundle branch block in the setting of muscular dystrophy  Chest Pain - atypical features   - Reports occasional left sided aching chest pain that occurs at rest. Denies exertional CP (See HPI)  - Normal myocardial perfusion scan. There is no evidence of myocardial ischemia or infarction. (3.12.24)  - Normal myocardial perfusion study with no evidence of ischemia or scar (3.14.23)  - Normal myocardial perfusion study with no evidence of ischemia or scar (3.28.22)  - Lexiscan stress test- negative for ischemia ( 5.6.21)  - EF-65%, no significant valvular abnormalities per ECHO 12.12.23  - Recommended EKG/Echo every 6 months and stress test annually per patient's neurologist Dr. Haines. Fax Number: [phone]  - Echocardiogram stress test due in June 2024. Nuclear Stress Test Due in March 2025  - Will add SL nitro PRN CP.  Discussed medication and potential side effects with the patient.    DOVE- stable   - EF- 65%, no significant valvular abnormalities per ECHO 12.12.23  - EF - 60% per ECHO 6.14.23    COPD (wear oxygen 2 L nasal cannula at night)  - Reports stable DOVE that improves with inhalers.  - Continue management per P pulmonology    Hypertension  - BP at goal  - Continue lisinopril 5 mg daily  - Counseled on low salt diet and activity as tolerated      Hyperlipidemia  - Statin and zetia contraindicated due to muscular dystrophy  - Continue omega-3  - Management per PCP    Muscular dystrophy  - Management per neurologist in [facility]  Dr. Haines.    SL nitro PRN CP   Echocardiogram. Due in June 2024  Nuclear Stress Test Due in March 2025. Will order at later date   Follow up in cardiology clinic in 6 months or sooner pending results   Follow up with PCP as directed

## 2024-05-20 DIAGNOSIS — R23.2 HOT FLASHES: ICD-10-CM

## 2024-05-21 RX ORDER — PAROXETINE 10 MG/1
10 TABLET, FILM COATED ORAL
Qty: 90 TABLET | Refills: 0 | Status: SHIPPED | OUTPATIENT
Start: 2024-05-21 | End: 2024-06-12 | Stop reason: SDUPTHER

## 2024-05-24 DIAGNOSIS — R10.13 EPIGASTRIC ABDOMINAL PAIN: ICD-10-CM

## 2024-05-24 DIAGNOSIS — I10 HTN (HYPERTENSION), BENIGN: ICD-10-CM

## 2024-05-24 DIAGNOSIS — R68.81 EARLY SATIETY: ICD-10-CM

## 2024-05-27 RX ORDER — OMEPRAZOLE 40 MG/1
40 CAPSULE, DELAYED RELEASE ORAL
Qty: 30 CAPSULE | Refills: 5 | Status: SHIPPED | OUTPATIENT
Start: 2024-05-27

## 2024-05-31 DIAGNOSIS — I10 HTN (HYPERTENSION), BENIGN: ICD-10-CM

## 2024-05-31 RX ORDER — LISINOPRIL 5 MG/1
5 TABLET ORAL
Qty: 90 TABLET | Refills: 0 | OUTPATIENT
Start: 2024-05-31

## 2024-06-05 RX ORDER — LISINOPRIL 5 MG/1
5 TABLET ORAL DAILY
Qty: 90 TABLET | Refills: 1 | Status: SHIPPED | OUTPATIENT
Start: 2024-06-05

## 2024-06-12 ENCOUNTER — OFFICE VISIT (OUTPATIENT)
Dept: FAMILY MEDICINE | Facility: CLINIC | Age: 59
End: 2024-06-12
Payer: MEDICAID

## 2024-06-12 ENCOUNTER — HOSPITAL ENCOUNTER (OUTPATIENT)
Dept: RADIOLOGY | Facility: HOSPITAL | Age: 59
Discharge: HOME | End: 2024-06-12
Attending: FAMILY MEDICINE
Payer: MEDICAID

## 2024-06-12 VITALS
HEIGHT: 67 IN | BODY MASS INDEX: 24.33 KG/M2 | RESPIRATION RATE: 18 BRPM | DIASTOLIC BLOOD PRESSURE: 63 MMHG | TEMPERATURE: 98 F | SYSTOLIC BLOOD PRESSURE: 103 MMHG | WEIGHT: 155 LBS | HEART RATE: 87 BPM | OXYGEN SATURATION: 97 %

## 2024-06-12 DIAGNOSIS — R23.2 HOT FLASHES: ICD-10-CM

## 2024-06-12 DIAGNOSIS — R22.9 SUBCUTANEOUS NODULE: ICD-10-CM

## 2024-06-12 DIAGNOSIS — M79.671 RIGHT FOOT PAIN: Primary | ICD-10-CM

## 2024-06-12 DIAGNOSIS — S92.301A: ICD-10-CM

## 2024-06-12 DIAGNOSIS — I10 HTN (HYPERTENSION), BENIGN: ICD-10-CM

## 2024-06-12 DIAGNOSIS — M79.671 RIGHT FOOT PAIN: ICD-10-CM

## 2024-06-12 PROCEDURE — 3078F DIAST BP <80 MM HG: CPT | Mod: CPTII,,, | Performed by: FAMILY MEDICINE

## 2024-06-12 PROCEDURE — 3008F BODY MASS INDEX DOCD: CPT | Mod: CPTII,,, | Performed by: FAMILY MEDICINE

## 2024-06-12 PROCEDURE — 73630 X-RAY EXAM OF FOOT: CPT | Mod: TC,RT

## 2024-06-12 PROCEDURE — 4010F ACE/ARB THERAPY RXD/TAKEN: CPT | Mod: CPTII,,, | Performed by: FAMILY MEDICINE

## 2024-06-12 PROCEDURE — 3074F SYST BP LT 130 MM HG: CPT | Mod: CPTII,,, | Performed by: FAMILY MEDICINE

## 2024-06-12 PROCEDURE — 99214 OFFICE O/P EST MOD 30 MIN: CPT | Mod: S$PBB,,, | Performed by: FAMILY MEDICINE

## 2024-06-12 PROCEDURE — 99215 OFFICE O/P EST HI 40 MIN: CPT | Mod: PBBFAC,25 | Performed by: FAMILY MEDICINE

## 2024-06-12 RX ORDER — METHYLPREDNISOLONE 4 MG/1
TABLET ORAL
Qty: 21 EACH | Refills: 0 | Status: SHIPPED | OUTPATIENT
Start: 2024-06-12 | End: 2024-07-03

## 2024-06-12 RX ORDER — PAROXETINE 10 MG/1
10 TABLET, FILM COATED ORAL DAILY
Qty: 90 TABLET | Refills: 1 | Status: SHIPPED | OUTPATIENT
Start: 2024-06-12 | End: 2024-06-20 | Stop reason: SDUPTHER

## 2024-06-12 NOTE — PROGRESS NOTES
Patient Name: Cheryl Bauer   : 1965  MRN: [id number]     SUBJECTIVE:  Cheryl Bauer is a 59 y.o. female here for Follow-up (The patient states that the lump on the left side of her face is still there from last visit and never went down or away.)  .    HPI  Here for routine follow up and above issue.  well-controlled hypertension.  Taking lisinopril 5 mg daily. At home normal, sometimes randomly 140's.  -hot flashes well-controlled on Paxil 10 mg daily.      Nodule over left side of the face.  Would like to get it removed.  Unsure if it is increasing in size but has not decreased either.    follows with neurologist/myotonic dystrophy physician in [place], Dr. Haines, who recommends a EKG and Echocardiogram every 6 months along with an annual Nuclear Stress Test.   Denies smoking. Quit 12 years ago.  Follows with oulmonology for copd. On 2 L home oxygen at night. Can not tolerate the mask.      Fell 2 weeks ago, tripped while getting the dog out. Was not using any walker.  Since the fall has been having right foot pain and swelling.  Uses a a walker at home and a wheelchair for longer distances.   Has tramadol left and took one for pain and did not help.        ALLERGIES:   Review of patient's allergies indicates:   Allergen Reactions    Phenytoin sodium extended Hives and Other (See Comments)         ROS:  Review of Systems   Constitutional:  Negative for chills and fever.   HENT:  Negative for congestion.    Eyes:  Negative for blurred vision.   Respiratory:  Negative for cough and shortness of breath.    Cardiovascular:  Positive for leg swelling (right foot). Negative for chest pain and palpitations.   Gastrointestinal:  Negative for blood in stool, nausea and vomiting.   Genitourinary:  Negative for dysuria and hematuria.   Musculoskeletal:  Positive for joint pain.   Neurological:  Negative for dizziness and headaches.   Psychiatric/Behavioral:  Negative for depression. The patient is not  "nervous/anxious.          OBJECTIVE:  Vital signs  Vitals:    06/12/24 0818   BP: 103/63   Pulse: 87   Resp: 18   Temp: 98 °F (36.7 °C)   TempSrc: Oral   SpO2: 97%   Weight: 70.3 kg (155 lb)   Height: 5' 7" (1.702 m)      Body mass index is 24.28 kg/m².    PHYSICAL EXAM:   Physical Exam  Vitals reviewed.   Constitutional:       General: She is not in acute distress.     Appearance: Normal appearance. She is not ill-appearing.   HENT:      Head: Normocephalic and atraumatic.      Right Ear: External ear normal.      Left Ear: External ear normal.      Nose: Nose normal. No rhinorrhea.      Mouth/Throat:      Mouth: Mucous membranes are moist.   Eyes:      General: No scleral icterus.        Right eye: No discharge.         Left eye: No discharge.      Conjunctiva/sclera: Conjunctivae normal.      Pupils: Pupils are equal, round, and reactive to light.   Cardiovascular:      Rate and Rhythm: Normal rate and regular rhythm.   Pulmonary:      Effort: Pulmonary effort is normal. No respiratory distress.      Breath sounds: No wheezing, rhonchi or rales.   Abdominal:      General: Bowel sounds are normal. There is no distension.      Palpations: Abdomen is soft.      Tenderness: There is no abdominal tenderness.   Musculoskeletal:         General: Tenderness (tenderness dorsal of the foot) present. Normal range of motion.      Cervical back: Normal range of motion and neck supple. No rigidity or tenderness.      Right lower leg: Edema (right foot swelling) present.      Left lower leg: No edema.      Comments: Using a walker   Skin:     General: Skin is warm.      Findings: Lesion (subcuteanous nodule left side of the face, non tender) present. No rash.   Neurological:      General: No focal deficit present.      Mental Status: She is alert and oriented to person, place, and time.   Psychiatric:         Mood and Affect: Mood normal.         Behavior: Behavior normal.          ASSESSMENT/PLAN:  1. Right foot " pain  Concerned for fracture since it is painful and swollen after a fall.  Check x-ray.  Medrol Dosepak to help.  Can not use NSAIDs because of stomach issues.  Can continue with tramadol she has left at home.  Updated x-ray showed fracture.  Postop shoe provided in the office.  Referred to Orthopedics.  -     methylPREDNISolone (MEDROL DOSEPACK) 4 mg tablet; use as directed  Dispense: 21 each; Refill: 0  -     X-Ray Foot Complete Right; Future; Expected date: 06/12/2024    2. Closed traumatic minimally displaced fracture of metatarsal bone of right foot, initial encounter  As above  -     Ambulatory referral/consult to Orthopedics; Future; Expected date: 06/20/2024    3. Subcutaneous nodule  Referred to family practice for removal.  -     Ambulatory referral/consult to Family Practice; Future; Expected date: 06/19/2024    4. Hot flashes  Well-controlled.  Continue with Paxil 20 mg daily.  -      paroxetine (PAXIL) 10 MG tablet; Take 1 tablet (10 mg total) by mouth once daily.  Dispense: 90 tablet; Refill: 1    5. HTN (hypertension), benign  Well-controlled.  Continue with lisinopril 5 mg daily.         Previous medical history/lab work/radiology reviewed and considered during medical management decisions.   Medication list reviewed and medication reconciliation performed.  Patient was provided  and care about his/her current diagnosis (es) and medications including risk/benefit and side effects/adverse events, over the counter medication uses/doses, home self-care and contact precautions,  and red flags and indications for when to seek immediate medical attention.   Patient was advised to continue compliance with current medication list and medical recommendations.  Recommended/ Advised continued compliance with recommended eating habits/ diets for medical conditions and exercise 150 minutes/ week (if possible) for medical condition (s).        RESULTS:  Recent Results (from the past 1008 hour(s))   Echo     Collection Time: 06/20/24  9:08 AM   Result Value Ref Range    BSA 1.82 m2    Ojeda's Biplane MOD Ejection Fraction 65 %    A2C EF 66 %    A4C EF 66 %    LVOT stroke volume 59.10 cm3    LVIDd 3.49 (A) 3.5 - 6.0 cm    LV Systolic Volume 20.39 mL    LV Systolic Volume Index 11.3 mL/m2    LVIDs 2.41 2.1 - 4.0 cm    LV ESV A2C 17.19 mL    LV Diastolic Volume 50.66 mL    LV ESV A4C 14.51 mL    LV Diastolic Volume Index 27.99 mL/m2    LV EDV A2C 31.032195078322546 mL    LV EDV A4C 35.92 mL    Left Ventricular End Systolic Volume by Teichholz Method 20.39 mL    Left Ventricular End Diastolic Volume by Teichholz Method 50.66 mL    IVS 1.07 0.6 - 1.1 cm    LVOT diameter 1.95 cm    LVOT area 3.0 cm2    FS 31 28 - 44 %    Left Ventricle Relative Wall Thickness 0.59 cm    Posterior Wall 1.03 0.6 - 1.1 cm    LV mass 110.56 g    LV Mass Index 61 g/m2    MV Peak E Yosi 0.44 m/s    TDI LATERAL 0.10 m/s    MV Peak A Yosi 0.50 m/s    TR Max Yosi 2.57 m/s    E/A ratio 0.88     E wave deceleration time 225.52 msec    LV LATERAL E/E' RATIO 4.40 m/s    LVOT peak yosi 1.06 m/s    Left Ventricular Outflow Tract Mean Velocity 0.67 cm/s    Left Ventricular Outflow Tract Mean Gradient 2.15 mmHg    TAPSE 1.82 cm    LA size 3.75 cm    LA volume (mod) 15.81 cm3    LA Volume Index (Mod) 8.7 mL/m2    AV mean gradient 4 mmHg    AV peak gradient 7 mmHg    Ao peak yosi 1.30 m/s    Ao VTI 27.70 cm    LVOT peak VTI 19.80 cm    AV valve area 2.13 cm²    AV Velocity Ratio 0.82     AV index (prosthetic) 0.71     TEJ by Velocity Ratio 2.43 cm²    MV mean gradient 1 mmHg    MV peak gradient 2 mmHg    MV valve area by continuity eq 4.38 cm2    MV VTI 13.5 cm    Triscuspid Valve Regurgitation Peak Gradient 26 mmHg    ZLVIDS -1.97     ZLVIDD -3.61     LA area A4C 7.96 cm2    LA area A2C 8.58 cm2    RA Major Axis 3.0 cm    Mitral Valve Heart Rate 77 bpm    TV resting pulmonary artery pressure 29 mmHg    RV TB RVSP 6 mmHg    Est. RA pres 3 mmHg    IVC diameter 1.6  cm         Follow Up:  No follow-ups on file.     [unfilled]    This note was created with the assistance of a voice recognition software or phone dictation. There may be transcription errors as a result of using this technology however minimal. Effort has been made to assure accuracy of transcription but any obvious errors or omissions should be clarified with the author of the document

## 2024-06-20 ENCOUNTER — OFFICE VISIT (OUTPATIENT)
Dept: GASTROENTEROLOGY | Facility: CLINIC | Age: 59
End: 2024-06-20
Payer: MEDICAID

## 2024-06-20 ENCOUNTER — HOSPITAL ENCOUNTER (OUTPATIENT)
Dept: CARDIOLOGY | Facility: HOSPITAL | Age: 59
Discharge: HOME | End: 2024-06-20
Attending: NURSE PRACTITIONER
Payer: MEDICAID

## 2024-06-20 VITALS
BODY MASS INDEX: 24.33 KG/M2 | WEIGHT: 155 LBS | SYSTOLIC BLOOD PRESSURE: 120 MMHG | HEIGHT: 67 IN | DIASTOLIC BLOOD PRESSURE: 75 MMHG

## 2024-06-20 VITALS
SYSTOLIC BLOOD PRESSURE: 112 MMHG | OXYGEN SATURATION: 97 % | WEIGHT: 155 LBS | HEIGHT: 67 IN | HEART RATE: 89 BPM | DIASTOLIC BLOOD PRESSURE: 65 MMHG | BODY MASS INDEX: 24.33 KG/M2

## 2024-06-20 DIAGNOSIS — R06.09 DOE (DYSPNEA ON EXERTION): ICD-10-CM

## 2024-06-20 DIAGNOSIS — K31.89 REACTIVE GASTROPATHY: Primary | ICD-10-CM

## 2024-06-20 DIAGNOSIS — K59.04 CHRONIC IDIOPATHIC CONSTIPATION: ICD-10-CM

## 2024-06-20 DIAGNOSIS — Z86.010 PERSONAL HISTORY OF COLONIC POLYPS: ICD-10-CM

## 2024-06-20 PROBLEM — R10.13 EPIGASTRIC ABDOMINAL PAIN: Status: RESOLVED | Noted: 2023-07-13 | Resolved: 2024-06-20

## 2024-06-20 PROBLEM — R19.8 IRREGULAR BOWEL HABITS: Status: RESOLVED | Noted: 2023-07-13 | Resolved: 2024-06-20

## 2024-06-20 PROBLEM — R68.81 EARLY SATIETY: Status: RESOLVED | Noted: 2023-07-13 | Resolved: 2024-06-20

## 2024-06-20 LAB
APICAL FOUR CHAMBER EJECTION FRACTION: 66 %
APICAL TWO CHAMBER EJECTION FRACTION: 66 %
AV INDEX (PROSTH): 0.71
AV MEAN GRADIENT: 4 MMHG
AV PEAK GRADIENT: 7 MMHG
AV VALVE AREA BY VELOCITY RATIO: 2.43 CM²
AV VALVE AREA: 2.13 CM²
AV VELOCITY RATIO: 0.82
BSA FOR ECHO PROCEDURE: 1.82 M2
CV ECHO LV RWT: 0.59 CM
DOP CALC AO PEAK VEL: 1.3 M/S
DOP CALC AO VTI: 27.7 CM
DOP CALC LVOT AREA: 3 CM2
DOP CALC LVOT DIAMETER: 1.95 CM
DOP CALC LVOT PEAK VEL: 1.06 M/S
DOP CALC LVOT STROKE VOLUME: 59.1 CM3
DOP CALC MV VTI: 13.5 CM
DOP CALCLVOT PEAK VEL VTI: 19.8 CM
E WAVE DECELERATION TIME: 225.52 MSEC
E/A RATIO: 0.88
ECHO LV POSTERIOR WALL: 1.03 CM (ref 0.6–1.1)
FRACTIONAL SHORTENING: 31 % (ref 28–44)
HR MV ECHO: 77 BPM
INTERVENTRICULAR SEPTUM: 1.07 CM (ref 0.6–1.1)
IVC DIAMETER: 1.6 CM
LEFT ATRIUM AREA SYSTOLIC (APICAL 2 CHAMBER): 8.58 CM2
LEFT ATRIUM AREA SYSTOLIC (APICAL 4 CHAMBER): 7.96 CM2
LEFT ATRIUM SIZE: 3.75 CM
LEFT ATRIUM VOLUME INDEX MOD: 8.7 ML/M2
LEFT ATRIUM VOLUME MOD: 15.81 CM3
LEFT INTERNAL DIMENSION IN SYSTOLE: 2.41 CM (ref 2.1–4)
LEFT VENTRICLE DIASTOLIC VOLUME INDEX: 27.99 ML/M2
LEFT VENTRICLE DIASTOLIC VOLUME: 50.66 ML
LEFT VENTRICLE END DIASTOLIC VOLUME APICAL 2 CHAMBER: 31.63 ML
LEFT VENTRICLE END DIASTOLIC VOLUME APICAL 4 CHAMBER: 35.92 ML
LEFT VENTRICLE END SYSTOLIC VOLUME APICAL 2 CHAMBER: 17.19 ML
LEFT VENTRICLE END SYSTOLIC VOLUME APICAL 4 CHAMBER: 14.51 ML
LEFT VENTRICLE MASS INDEX: 61 G/M2
LEFT VENTRICLE SYSTOLIC VOLUME INDEX: 11.3 ML/M2
LEFT VENTRICLE SYSTOLIC VOLUME: 20.39 ML
LEFT VENTRICULAR INTERNAL DIMENSION IN DIASTOLE: 3.49 CM (ref 3.5–6)
LEFT VENTRICULAR MASS: 110.56 G
LV LATERAL E/E' RATIO: 4.4 M/S
LVED V (TEICH): 50.66 ML
LVES V (TEICH): 20.39 ML
LVOT MG: 2.15 MMHG
LVOT MV: 0.67 CM/S
MV MEAN GRADIENT: 1 MMHG
MV PEAK A VEL: 0.5 M/S
MV PEAK E VEL: 0.44 M/S
MV PEAK GRADIENT: 2 MMHG
MV VALVE AREA BY CONTINUITY EQUATION: 4.38 CM2
OHS LV EJECTION FRACTION SIMPSONS BIPLANE MOD: 65 %
PISA TR MAX VEL: 2.57 M/S
RA MAJOR: 3 CM
RA PRESSURE ESTIMATED: 3 MMHG
RV TB RVSP: 6 MMHG
TDI LATERAL: 0.1 M/S
TR MAX PG: 26 MMHG
TRICUSPID ANNULAR PLANE SYSTOLIC EXCURSION: 1.82 CM
TV REST PULMONARY ARTERY PRESSURE: 29 MMHG
Z-SCORE OF LEFT VENTRICULAR DIMENSION IN END DIASTOLE: -3.61
Z-SCORE OF LEFT VENTRICULAR DIMENSION IN END SYSTOLE: -1.97

## 2024-06-20 PROCEDURE — 3078F DIAST BP <80 MM HG: CPT | Mod: CPTII,,, | Performed by: NURSE PRACTITIONER

## 2024-06-20 PROCEDURE — 1159F MED LIST DOCD IN RCRD: CPT | Mod: CPTII,,, | Performed by: NURSE PRACTITIONER

## 2024-06-20 PROCEDURE — 3074F SYST BP LT 130 MM HG: CPT | Mod: CPTII,,, | Performed by: NURSE PRACTITIONER

## 2024-06-20 PROCEDURE — 1160F RVW MEDS BY RX/DR IN RCRD: CPT | Mod: CPTII,,, | Performed by: NURSE PRACTITIONER

## 2024-06-20 PROCEDURE — 3008F BODY MASS INDEX DOCD: CPT | Mod: CPTII,,, | Performed by: NURSE PRACTITIONER

## 2024-06-20 PROCEDURE — 99214 OFFICE O/P EST MOD 30 MIN: CPT | Mod: S$PBB,,, | Performed by: NURSE PRACTITIONER

## 2024-06-20 PROCEDURE — 4010F ACE/ARB THERAPY RXD/TAKEN: CPT | Mod: CPTII,,, | Performed by: NURSE PRACTITIONER

## 2024-06-20 PROCEDURE — 93306 TTE W/DOPPLER COMPLETE: CPT

## 2024-06-20 PROCEDURE — 99214 OFFICE O/P EST MOD 30 MIN: CPT | Mod: PBBFAC,25 | Performed by: NURSE PRACTITIONER

## 2024-06-20 RX ORDER — OMEPRAZOLE 40 MG/1
40 CAPSULE, DELAYED RELEASE ORAL EVERY MORNING
Qty: 90 CAPSULE | Refills: 3 | Status: SHIPPED | OUTPATIENT
Start: 2024-06-20

## 2024-06-20 RX ORDER — OMEPRAZOLE 40 MG/1
1 CAPSULE, DELAYED RELEASE ORAL
COMMUNITY
End: 2024-06-20

## 2024-06-20 RX ORDER — PAROXETINE 10 MG/1
10 TABLET, FILM COATED ORAL DAILY
COMMUNITY

## 2024-06-20 RX ORDER — MEXILETINE HYDROCHLORIDE 200 MG/1
200 CAPSULE ORAL EVERY 8 HOURS
COMMUNITY

## 2024-06-20 RX ORDER — LISINOPRIL 5 MG/1
5 TABLET ORAL DAILY
COMMUNITY
End: 2024-06-20 | Stop reason: SDUPTHER

## 2024-06-20 NOTE — ASSESSMENT & PLAN NOTE
Recommend soluble fiber supplementation  Avoid straining or sitting on the toilet for long periods of time  Start Linzess 145 mcg daily, okay for enema use 1-2 times per week as needed for rectal stimulation  Call with updates

## 2024-06-20 NOTE — ASSESSMENT & PLAN NOTE
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  Pathology revealed reactive gastritis/gastropathy, negative for H pylori organisms, negative for dysplasia.  GERD lifestyle modifications  Reflux precautions  Limit NSAID use  Continue omeprazole 40 mg daily (refilled)   Call with updates   Follow-up clinic visit with NP in 6 months

## 2024-06-20 NOTE — PROGRESS NOTES
Subjective:       Patient ID: Cheryl Bauer is a 59 y.o. female.    Chief Complaint: Constipation    This 59-year-old  female with hypertension, hypertriglyceridemia, multinodular goiter s/p right thyroidectomy and isthmus, myotonic muscular dystrophy, left bundle branch block, and colon polyps presents accompanied by her significant other for a follow-up visit.  She was initially seen March 18, 2021 and referred for alternating bowel habits at that time.  She reported intermittent epigastric abdominal pain for the past 1-1/2 months described as aching, cramping, and nonradiating.  The pain was worsened with eating and she was unable to identify specific food triggers.  She denied relieving factors.  She had occasional acid reflux and pyrosis and denied taking anything for symptomatic relief.  Her appetite was good and her weight was stable.  She denied nocturnal symptoms, fever, chills, nausea, vomiting, odynophagia, dysphagia, or early satiety.  She reported fluctuating bowel habits between loose to watery stools 5-6 times per day for 2-3 consecutive days followed by constipation with frequent straining and occasional bright red rectal bleeding with bowel movements noted on the tissue after wiping for 3 to 5 days before the diarrhea started again.  She denied melena, fecal urgency, fecal incontinence, tenesmus, or pain with defecation.  She denied taking anything for treatment of constipation or diarrhea.     Laboratory results March 9, 2021 revealed fecal calprotectin 29, pancreatic elastase 371, and negative stool culture, ova and parasite, Giardia, fecal leukocyte, rotavirus, and Cryptosporidium.       Abdominal ultrasound April 7, 2021 revealed diffuse hepatic steatosis and normal gallbladder.       She underwent EGD and colonoscopy November 17, 2021.  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  Pathology revealed mild chronic gastritis with no dysplasia identified and no Helicobacter pylori organisms identified.      Colonoscopy revealed hemorrhoids found on perianal exam, examined portion of ileum normal, multiple fragments of adenomatous polyps x5 in the ascending colon with polypectomy and no evidence of malignancy, multiple fragments of adenomatous polyps x2 in the transverse colon with polypectomy and no evidence of malignancy, multiple fragments of adenomatous polyps x2 in the descending colon with polypectomy and no evidence of malignancy, and adenomatous polyps x3 in the sigmoid colon with polypectomy and no evidence of malignancy.  She was recommended repeat colonoscopy in 3 years.     She was seen for a follow-up visit July 13, 2023 and reported minimal change in symptoms from initial office visit in March 2021.  She felt as though epigastric abdominal pain had worsened over the past few months and was described aching, cramping, and radiating down above her umbilical region.  She was unable to identify specific triggers or relieving factors.  She denied appetite changes, unintentional weight loss, fever, chills, nausea, vomiting, hematemesis, odynophagia, dysphagia, acid reflux, or pyrosis.  She had occasional early satiety.  Bowel habits continued to fluctuate between loose stools and formed to soft stools 3-5 times daily.  She denied taking anything for symptomatic relief as she would get constipated.  She denied melena, hematochezia, fecal urgency, fecal incontinence, or pain with defecation.    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  Pathology revealed reactive gastritis/gastropathy, negative for H pylori organisms, negative for dysplasia.    Today, she presents for a follow-up  visit.  She continues to take omeprazole 40 mg daily.  She reports taking MiraLax 17 g daily with worsening constipation noted over the past 6 months.  She reports going up to 15 days without any bowel movement over the past few weeks and that her sister had to go to her home to administer an enema.  She did have some relief with enema administration.  She reports difficulty passing stool and not really having an urge to go to the restroom over the past 6 months.  She denies melena, hematochezia, fecal urgency, fecal incontinence, or pain with defecation.  Her appetite is fair and her weight has fluctuated.  She denies fever, chills, nausea, vomiting, hematemesis, odynophagia, dysphagia, acid reflux, pyrosis, early satiety, or abdominal pain.     She underwent colonoscopy August 2, 2017 with findings of tubulovillous adenomatous polyp at the cecum with no evidence of malignancy, tubular adenomatous polyp at 30 cm with no evidence of malignancy, tubular adenomatous polyp at 43 cm with no evidence of malignancy.  Three cecal polyps were noted with the largest measuring 1.5 cm.  Biopsies were obtained of the 2 smaller polyps, and the 1.5 cm polyp was removed in its entirety by snare.  She was recommended repeat colonoscopy in 3 years.  She denies regular NSAID use or use of blood thinners.  She denies tobacco use and drinks 5-6 beers per day.  She is a former smoker.  She denies a family history of IBD, colon polyps, or colon cancer.      Review of patient's allergies indicates:   Allergen Reactions    Phenytoin sodium extended Hives and Other (See Comments)     Past Medical History:   Diagnosis Date    COPD (chronic obstructive pulmonary disease)     Hypertension     Muscular dystrophy      Past Surgical History:   Procedure Laterality Date    CARPAL TUNNEL RELEASE Left     EGD, WITH CLOSED BIOPSY N/A 11/1/2023    Procedure: EGD, WITH CLOSED BIOPSY;  Surgeon: Lacy Miles MD;  Location: [facility];   Service: Gastroenterology;  Laterality: N/A;    HYSTERECTOMY      KNEE SURGERY Left     THYROIDECTOMY Right     TONSILLECTOMY AND ADENOIDECTOMY      TUBAL LIGATION       Family History:   family history includes Diabetes in her father; Heart attack (age of onset: 68) in her father; Heart disease in her father; Heart failure in her father; Hypertension in her father.    Social History:    reports that she quit smoking about 15 years ago. Her smoking use included cigarettes. She has never used smokeless tobacco. She reports current alcohol use of about 1.0 standard drink of alcohol per week. She reports that she does not use drugs.    Review of Systems  Negative except as noted in the HPI.      Objective:      Physical Exam  Constitutional:       Appearance: Normal appearance.      Comments: Seated in wheelchair   HENT:      Head: Normocephalic.      Mouth/Throat:      Mouth: Mucous membranes are moist.   Eyes:      Extraocular Movements: Extraocular movements intact.      Conjunctiva/sclera: Conjunctivae normal.      Pupils: Pupils are equal, round, and reactive to light.   Cardiovascular:      Rate and Rhythm: Normal rate and regular rhythm.      Pulses: Normal pulses.      Heart sounds: Normal heart sounds.   Pulmonary:      Effort: Pulmonary effort is normal.      Breath sounds: Normal breath sounds.   Abdominal:      General: Bowel sounds are normal.      Palpations: Abdomen is soft.   Musculoskeletal:         General: Normal range of motion.      Cervical back: Normal range of motion and neck supple.   Skin:     General: Skin is warm and dry.   Neurological:      General: No focal deficit present.      Mental Status: She is alert and oriented to person, place, and time.   Psychiatric:         Mood and Affect: Mood normal.         Behavior: Behavior normal.         Thought Content: Thought content normal.         Judgment: Judgment normal.         Home Medications:     Current Outpatient Medications   Medication  Sig    albuterol (PROVENTIL/VENTOLIN HFA) 90 mcg/actuation inhaler Inhale 1 puff into the lungs every 4 (four) hours as needed.    ANORO ELLIPTA 62.5-25 mcg/actuation DsDv Inhale 1 puff into the lungs once daily.    lisinopriL (PRINIVIL,ZESTRIL) 5 MG tablet Take 1 tablet (5 mg total) by mouth once daily.    methylPREDNISolone (MEDROL DOSEPACK) 4 mg tablet use as directed    mexiletine (MEXITIL) 200 MG Cap Take 200 mg by mouth every 8 (eight) hours.    nitroGLYCERIN (NITROSTAT) 0.4 MG SL tablet Place 1 tablet (0.4 mg total) under the tongue every 5 (five) minutes as needed for Chest pain.    omega-3 fatty acids/fish oil (FISH OIL-OMEGA-3 FATTY ACIDS) 300-1,000 mg capsule Take 3 capsules by mouth 2 (two) times a day.    paroxetine (PAXIL) 10 MG tablet Take 10 mg by mouth once daily.    polyethylene glycol 3350 (MIRALAX ORAL) Take by mouth.    traMADoL (ULTRAM) 50 mg tablet Take 50 mg by mouth every 6 (six) hours as needed.    linaCLOtide (LINZESS) 145 mcg Cap capsule Take 1 capsule (145 mcg total) by mouth before breakfast.    lisinopriL (PRINIVIL,ZESTRIL) 5 MG tablet Take 5 mg by mouth once daily.    mexiletine (MEXITIL) 200 MG Cap Take 200 mg by mouth every 8 (eight) hours.    omeprazole (PRILOSEC) 40 MG capsule Take 1 capsule (40 mg total) by mouth every morning.    paroxetine (PAXIL) 10 MG tablet Take 1 tablet (10 mg total) by mouth once daily.     No current facility-administered medications for this visit.     Laboratory Results:     Recent Results (from the past 6720 hour(s))   Specimen to Pathology    Collection Time: 11/01/23 10:08 AM   Result Value Ref Range    Case Report       [facility]                            Case: [id number]                                 Authorizing Provider:  Lacy Miles MD   Collected:           11/01/2023 10:08 AM          Ordering Location:     [facility] -       Received:            11/01/2023 12:10 PM                                 Endoscopy     "                                                                Pathologist:           Jadyn Guevara MD                                                        Specimen:    Stomach, gastric bx r/o h. pylori                                                          Final Diagnosis         Gastric biopsy rule out H.pylori:  - Reactive gastritis/gastropathy.  - Diff Quik stain is negative for H.pylori organisms.  - Negative for dysplasia.          Comments       Appropriately stained controls reviewed.        Clinical Information       Procedure:  EGD, WITH CLOSED BIOPSY  Pre-op Diagnosis:  R10.13 - Epigastric abdominal pain [ICD-10-CM]  R19.8 - Irregular bowel habits [ICD-10-CM]  R68.81 - Early satiety [ICD-10-CM]  Post-op Diagnosis:  R10.13 - Epigastric abdominal pain [ICD-10-CM]  R19.8 - Irregular bowel habits [ICD-10-CM]  R68.81 - Early satiety [ICD-10-CM]      Microscopic Description       A microscopic examination was performed and the diagnosis reflects the findings.          Gross Description       1. Stomach, gastric bx r/o h. pylori:   Received in formalin are multiple fragments of tan soft tissue ranging from 1 to 3 mm. Entirely submitted in a single cassette.      Report Footnotes       Unless the case is a "gross only" or additional testing only, the final diagnosis for each specimen is based on a microscopic examination of appropriate tissue sections.     Echo    Collection Time: 12/12/23  1:24 PM   Result Value Ref Range    BSA 1.86 m2    Ojeda's Biplane MOD Ejection Fraction 65 %    LVOT stroke volume 48.85 cm3    LVIDd 4.26 3.5 - 6.0 cm    LV Systolic Volume 32.95 mL    LV Systolic Volume Index 17.9 mL/m2    LVIDs 2.93 2.1 - 4.0 cm    LV Diastolic Volume 81.33 mL    LV Diastolic Volume Index 44.20 mL/m2    IVS 0.94 0.6 - 1.1 cm    LVOT diameter 1.96 cm    LVOT area 3.0 cm2    FS 31 28 - 44 %    Left Ventricle Relative Wall Thickness 0.52 cm    Posterior Wall 1.10 0.6 - 1.1 cm    LV mass 144.32 g "    LV Mass Index 78 g/m2    MV Peak E Yosi 0.46 m/s    TDI LATERAL 0.09 m/s    MV Peak A Yosi 0.47 m/s    TR Max Yosi 2.69 m/s    E/A ratio 0.98     E wave deceleration time 149.71 msec    LV LATERAL E/E' RATIO 5.11 m/s    LVOT peak yosi 0.73 m/s    Left Ventricular Outflow Tract Mean Velocity 0.53 cm/s    Left Ventricular Outflow Tract Mean Gradient 1.22 mmHg    TAPSE 1.82 cm    LA size 3.43 cm    LA volume (mod) 16.52 cm3    LA Volume Index (Mod) 9.0 mL/m2    RA Major Axis 3.14 cm    AV mean gradient 3 mmHg    AV peak gradient 7 mmHg    Ao peak yosi 1.28 m/s    Ao VTI 28.80 cm    LVOT peak VTI 16.20 cm    AV valve area 1.70 cm²    AV Velocity Ratio 0.57     AV index (prosthetic) 0.56     TEJ by Velocity Ratio 1.72 cm²    Mr max yosi 4.12 m/s    MV mean gradient 1 mmHg    MV peak gradient 1 mmHg    MV valve area by continuity eq 4.29 cm2    MV VTI 11.4 cm    Triscuspid Valve Regurgitation Peak Gradient 29 mmHg    ZLVIDS -0.58     ZLVIDD -1.81     Mitral Valve Heart Rate 69 bpm    TV resting pulmonary artery pressure 32 mmHg    RV TB RVSP 6 mmHg    Est. RA pres 3 mmHg   EKG 12-lead    Collection Time: 03/12/24  7:38 AM   Result Value Ref Range    QRS Duration 110 ms    [facility] QTC Calculation 506 ms   Nuclear Stress - Cardiology Interpreted    Collection Time: 03/12/24  9:19 AM   Result Value Ref Range    85% Max Predicted      Max Predicted      [facility] CV CPX PATIENT IS MALE 0.0     [facility] CV CPX PATIENT IS FEMALE 1.0     HR at rest 75 bpm    Systolic blood pressure 116 mmHg    Diastolic blood pressure 78 mmHg    RPP 8,700     Exercise duration (min) 0 minutes    Exercise duration (sec) 46 seconds    Peak  bpm    Peak Systolic  mmHg    Peak Diatolic BP 81 mmHg    Peak RPP 12,896     Estimated METs 1     % Max HR Achieved 67     Nuc Rest EF 86     Nuc Stress EF 87 %     Assessment/Plan:     Problem List Items Addressed This Visit          GI    Reactive gastropathy - Primary     She underwent EGD November 1,  2023 with findings of 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  Pathology revealed reactive gastritis/gastropathy, negative for H pylori organisms, negative for dysplasia.  GERD lifestyle modifications  Reflux precautions  Limit NSAID use  Continue omeprazole 40 mg daily (refilled)   Call with updates   Follow-up clinic visit with NP in 6 months         Relevant Medications    omeprazole (PRILOSEC) 40 MG capsule    Chronic idiopathic constipation     Recommend soluble fiber supplementation  Avoid straining or sitting on the toilet for long periods of time  Start Linzess 145 mcg daily, okay for enema use 1-2 times per week as needed for rectal stimulation  Call with updates         Relevant Medications    polyethylene glycol 3350 (MIRALAX ORAL)    linaCLOtide (LINZESS) 145 mcg Cap capsule    Personal history of colonic polyps     Colonoscopy November 17, 2021 revealed hemorrhoids found on perianal exam, examined portion of ileum normal, multiple fragments of adenomatous polyps x5 in the ascending colon with polypectomy and no evidence of malignancy, multiple fragments of adenomatous polyps x2 in the transverse colon with polypectomy and no evidence of malignancy, multiple fragments of adenomatous polyps x2 in the descending colon with polypectomy and no evidence of malignancy, and adenomatous polyps x3 in the sigmoid colon with polypectomy and no evidence of malignancy.  She was recommended repeat colonoscopy in 3 years.  Colonoscopy ordered per recall recommendation for November 2024         Relevant Orders    Case Request Endoscopy: COLONOSCOPY (Completed)

## 2024-06-20 NOTE — ASSESSMENT & PLAN NOTE
Colonoscopy November 17, 2021 revealed hemorrhoids found on perianal exam, examined portion of ileum normal, multiple fragments of adenomatous polyps x5 in the ascending colon with polypectomy and no evidence of malignancy, multiple fragments of adenomatous polyps x2 in the transverse colon with polypectomy and no evidence of malignancy, multiple fragments of adenomatous polyps x2 in the descending colon with polypectomy and no evidence of malignancy, and adenomatous polyps x3 in the sigmoid colon with polypectomy and no evidence of malignancy.  She was recommended repeat colonoscopy in 3 years.  Colonoscopy ordered per recall recommendation for November 2024

## 2024-06-24 ENCOUNTER — TELEPHONE (OUTPATIENT)
Dept: CARDIOLOGY | Facility: CLINIC | Age: 59
End: 2024-06-24
Payer: MEDICAID

## 2024-07-01 ENCOUNTER — OFFICE VISIT (OUTPATIENT)
Dept: ORTHOPEDICS | Facility: CLINIC | Age: 59
End: 2024-07-01
Payer: MEDICAID

## 2024-07-01 ENCOUNTER — HOSPITAL ENCOUNTER (OUTPATIENT)
Dept: RADIOLOGY | Facility: HOSPITAL | Age: 59
Discharge: HOME | End: 2024-07-01
Attending: FAMILY MEDICINE
Payer: MEDICAID

## 2024-07-01 VITALS
HEART RATE: 114 BPM | SYSTOLIC BLOOD PRESSURE: 113 MMHG | HEIGHT: 67 IN | DIASTOLIC BLOOD PRESSURE: 81 MMHG | BODY MASS INDEX: 24.05 KG/M2 | WEIGHT: 153.25 LBS

## 2024-07-01 DIAGNOSIS — S92.301A: Primary | ICD-10-CM

## 2024-07-01 DIAGNOSIS — S92.301A: ICD-10-CM

## 2024-07-01 PROCEDURE — 73630 X-RAY EXAM OF FOOT: CPT | Mod: TC,RT

## 2024-07-01 PROCEDURE — 28470 CLTX METATARSAL FX WO MNP EA: CPT | Mod: PBBFAC | Performed by: STUDENT IN AN ORGANIZED HEALTH CARE EDUCATION/TRAINING PROGRAM

## 2024-07-01 PROCEDURE — 99214 OFFICE O/P EST MOD 30 MIN: CPT | Mod: PBBFAC,25

## 2024-07-01 NOTE — PROGRESS NOTES
Subjective:    Patient ID: Cheryl Bauer is a 59 y.o. female  who presented to [facility] for new visit.      Chief Complaint: No chief complaint on file.      History of Present Illness:  Cheryl Bauer history  of right tibia fracture in 2015 (treated nonsurgically) presents to the clinic complaining of right foot pain onset 2 weeks and 5 days ago.  Date of injury June 12, 2024 patient has sustained a fall and twisted her right foot. She was unable to apply any weight on her right foot and developed immediate pain and swelling. She followed up with her PCP and was diagnosed with fracture of the right 4th metatarsal and given a post op shoe. Her pain is located around the right 4th metatarsal, 6/10, and improves with rest. Treatment to date: rest and post op shoe. Denies any history of surgery of her right foot. Overall her pain and swelling have improved.     Ankle/Foot Review of Systems:  Swelling?  no  Instability?  no  Mechanical sx?  no  <30 min AM stiffness? no  Limited ROM? no  Fever/Chills? no       Objective:      Physical Exam:    There were no vitals taken for this visit.    Ortho/SPM Exam    Appearance:  antalgic  PWB (uses walker)       Soft tissue swelling: Left: no Right: no  Effusion: Left:  Negative Right: Negative  Erythema: Left no Right: no  Ecchymosis: Left: no Right: no  Atrophy: Left: no Right: no    Palpation:  Ankle/Foot Tenderness: Left: non tender Right: head of the 4th metatarsal.    Range of motion:  Deferred due to acute fracture    Strength:  Deferred due to acute fracture      Special Tests:  Deferred due to acute fracture      General appearance: NAD  Peripheral pulses: normal bilaterally   Reflexes: Left: normal Right normal   Sensation: normal    Labs:  Last A1c: The patient doesn't have any registry metric data available     Imaging:   Previous images reviewed.  X-rays ordered and performed today: yes  # of views: 3 Laterality:  right  My Interpretation:  Closed minimally displaced fracture of the neck of the right 4th metatarsal with signs of callus formation and routine healing.      Assessment:        Encounter Diagnoses   Code Name Primary?    S92.301A Closed traumatic minimally displaced fracture of metatarsal bone of right foot, initial encounter         Plan:    MDM: Prior external referring provider notes reviewed. Prior external referring provider studies reviewed.   Dx:  Right closed minimally displaced fracture at the neck of the 4th metatarsal (date of injury June 12, 2024  -2 weeks and 5 days ago)  Treatment Plan: Discussed with patient diagnosis, prognosis, and treatment recommendations. Education provided.  Patient meets nonsurgical criteria.  Was given a firm soled shoe and weightbear as tolerable. Discussed conservative management including avoiding aggravating activities, activity modification, oral/topical analgesics (avoid NSAIDs), cold therapy, HEP, and to follow up with us in 2 weeks.  Expected healing time: 6-8 weeks.    Imaging: radiological studies ordered and independently reviewed; discussed with patient; pending radiologist interpretation.   Weight Management: is paramount. Maintain healthy weight of a BMI of <24.9..   Activity: Activity as tolerated; HEP to include aerobic conditioning and strength training with non-painful activity. ROM/STG exercises. Proper footware; assistive devises to avoid limping.   Therapy: HEP  Medication: START over-the-counter acetaminophen (Tylenol 1000 mg three times per day as needed). Please see your primary care physician for further refills.  RTC: 2 weeks.      Global fee period: July 1, 2024    This note is dictated using the M*Modal Fluency Direct word recognition program. There are word recognition mistakes that are occasionally missed on review.    Bahman Thompson D.O.  Sports Medicine Fellow

## 2024-07-03 ENCOUNTER — OFFICE VISIT (OUTPATIENT)
Dept: FAMILY MEDICINE | Facility: CLINIC | Age: 59
End: 2024-07-03
Payer: MEDICAID

## 2024-07-03 VITALS
DIASTOLIC BLOOD PRESSURE: 74 MMHG | OXYGEN SATURATION: 99 % | WEIGHT: 153 LBS | HEIGHT: 67 IN | TEMPERATURE: 99 F | BODY MASS INDEX: 24.01 KG/M2 | SYSTOLIC BLOOD PRESSURE: 110 MMHG | HEART RATE: 94 BPM | RESPIRATION RATE: 18 BRPM

## 2024-07-03 DIAGNOSIS — L72.0 EPIDERMAL INCLUSION CYST: Primary | ICD-10-CM

## 2024-07-03 DIAGNOSIS — R22.9 SUBCUTANEOUS NODULE: ICD-10-CM

## 2024-07-03 PROCEDURE — 99213 OFFICE O/P EST LOW 20 MIN: CPT | Mod: PBBFAC

## 2024-07-03 RX ORDER — LIDOCAINE HYDROCHLORIDE 10 MG/ML
5 INJECTION, SOLUTION EPIDURAL; INFILTRATION; INTRACAUDAL; PERINEURAL
Status: COMPLETED | OUTPATIENT
Start: 2024-07-03 | End: 2024-07-03

## 2024-07-03 RX ADMIN — LIDOCAINE HYDROCHLORIDE 50 MG: 10 INJECTION, SOLUTION EPIDURAL; INFILTRATION; INTRACAUDAL; PERINEURAL at 10:07

## 2024-07-03 NOTE — PROGRESS NOTES
"[facility] Note    CHIEF COMPLAINT:  Nodule      HISTORY OF  PRESENT ILLNESS:  Cheryl Bauer is a 59 y.o. female here in  [facility] Procedure Clinic for a nodule on the left side of her face. Pt states the nodule has been there for about one year. Has not changed in size. Is somewhat irritating, but not too painful. Denies any drainage, redness, bleeding. Denies hx of cysts.Would like to have the nodule removed.        PHYSICAL EXAMINATION:  Blood pressure 110/74, pulse 94, temperature 98.6 °F (37 °C), resp. rate 18, height 5' 6.93" (1.7 m), weight 69.4 kg (153 lb), SpO2 99%.    General:  VSS. No acute distress.   Skin: 0.4 cm skin-colored, circular, nodule just above the left side of the upper lip. Lesion is hard and mobile.      ASSESSMENT/PLAN:  Epidermal inclusion cyst  -Excision of cyst in the lip area performed, see procedure note below   -Post procedure instructions given   -F/u in 4 weeks to recheck site    PROCEDURE NOTE:  Procedure: Excision of cyst above the L upper lip area  Performed by: Lashanda Mccord MD  Supervised by: Jackeline Herbert MD   Consent: signed consent obtained after discussion of risks, benefits, and alternative treatments. Time out completed  Description: Area cleaned with alcohol. 1% lidocaine used for anesthesia. Skin cleaned with Chloroprep. Incision made with 4mm punch biopsy. Cyst contents extracted through wound opening. Hemostasis achieved with gelfoam.   EBL: < 5cc  The patient tolerated the procedure well with no complications.     Lashanda Mccord MD    [facility] Resident, HO-[facility]            "

## 2024-07-05 NOTE — PROGRESS NOTES
I have seen and examined the patient with the resident at the time of the appointment. The chart was reviewed. I agree with the assessment and plan. Care provided was reasonable and necessary. I was present for the procedure.    No cyst wall or contents removed during the procedure. After incision, cyst was no longer palpable- fluid filled and drained upon incision possibly?

## 2024-07-15 ENCOUNTER — HOSPITAL ENCOUNTER (OUTPATIENT)
Dept: RADIOLOGY | Facility: HOSPITAL | Age: 59
Discharge: HOME | End: 2024-07-15
Attending: STUDENT IN AN ORGANIZED HEALTH CARE EDUCATION/TRAINING PROGRAM
Payer: MEDICAID

## 2024-07-15 ENCOUNTER — OFFICE VISIT (OUTPATIENT)
Dept: ORTHOPEDICS | Facility: CLINIC | Age: 59
End: 2024-07-15
Payer: MEDICAID

## 2024-07-15 VITALS
HEART RATE: 83 BPM | HEIGHT: 66 IN | WEIGHT: 153 LBS | BODY MASS INDEX: 24.59 KG/M2 | SYSTOLIC BLOOD PRESSURE: 120 MMHG | DIASTOLIC BLOOD PRESSURE: 82 MMHG

## 2024-07-15 DIAGNOSIS — S92.301D: Primary | ICD-10-CM

## 2024-07-15 DIAGNOSIS — M79.671 PAIN IN RIGHT FOOT: ICD-10-CM

## 2024-07-15 PROCEDURE — 73630 X-RAY EXAM OF FOOT: CPT | Mod: TC,RT

## 2024-07-15 PROCEDURE — 99213 OFFICE O/P EST LOW 20 MIN: CPT | Mod: PBBFAC,25

## 2024-07-15 NOTE — PROGRESS NOTES
"Subjective:    Patient ID: Cheryl Bauer is a 59 y.o. female  who presented to [facility] for follow up.      Chief Complaint: Pain of the Right Foot      History of Present Illness:  Cheryl Bauer presents to the clinic for a 2 week follow up for right minimally displaced fracture of the neck of the 4th metatarsal onset 4w3d ago.  Date of injury June 12, 2024- she sustained a twisting injury to her right ankle. She states that she is non compliant with the post op shoe because she does not have any pain at rest or with ambulation. tTreatment to date: rest and post op shoe. Denies any history of surgery of her right foot.     Ankle/Foot Review of Systems:  Swelling?  no  Instability?  no  Mechanical sx?  no  <30 min AM stiffness? no  Limited ROM? no  Fever/Chills? no       Objective:      Physical Exam:    /82   Pulse 83   Ht 5' 6" (1.676 m)   Wt 69.4 kg (153 lb)   BMI 24.69 kg/m²     Ortho/SPM Exam    Appearance:  Normal gait/station  FWB      Soft tissue swelling: Left: no Right: no  Effusion: Left:  Negative Right: Negative  Erythema: Left no Right: no  Ecchymosis: Left: no Right: no  Atrophy: Left: no Right: no    Palpation:  Ankle/Foot Tenderness: Left: non tender Right: minimally tender at the head/neck of the 4th metatarsal .    Range of motion:  Deferred due to acute fracture    Strength:  Deferred due to acute fracture    Special Tests:  Deferred due to acute fracture      General appearance: NAD  Peripheral pulses: normal bilaterally   Reflexes: Left: normal Right normal   Sensation: normal    Labs:  Last A1c: The patient doesn't have any registry metric data available     Imaging:   Previous images reviewed.  X-rays ordered and performed today: yes  # of views: 3 Laterality: right  My Interpretation:   Closed minimally displaced fracture of the neck of the 4th metatarsal with surrounding callus formation    Assessment:        Encounter " Diagnoses   Code Name Primary?    S92.301D Closed traumatic minimally displaced fracture of metatarsal bone of right foot with routine healing, subsequent encounter Yes        Plan:      Dx: Right closed minimally displaced fracture of the neck of the 4th metatarsal (date of injury June food weeks and 5 days ago), with routine healing.  Treatment Plan: Discussed with patient diagnosis, prognosis, and treatment recommendations. Education provided.  Patient meets nonsurgical criteria. Continue post op shoe (discussed the importance of compliance) and weightbear as tolerable. Discussed conservative management including avoiding aggravating activities, activity modification, oral/topical analgesics (avoid NSAIDs), cold therapy, HEP, and to follow up with us in 2 weeks.   Expected healing time 6-8 weeks  Imaging: radiological studies ordered and independently reviewed; discussed with patient; pending radiologist interpretation.   Weight Management: is paramount. Maintain healthy weight of a BMI of <24.9..   Activity:  Weightbear as tolerable.  Continue home range of motion exercises right knee and toes.  Therapy: Continue home range of motion exercises right knee and toes.  Medication: START over-the-counter acetaminophen (Tylenol 1000 mg three times per day as needed). Please see your primary care physician for further refills.  RTC: 2 weeks.      Global fee period: July 1,2024    This note is dictated using the M*Modal Fluency Direct word recognition program. There are word recognition mistakes that are occasionally missed on review.    Bahman Thompson D.O.  Sports Medicine Fellow

## 2024-10-29 ENCOUNTER — OFFICE VISIT (OUTPATIENT)
Dept: CARDIOLOGY | Facility: CLINIC | Age: 59
End: 2024-10-29
Payer: MEDICAID

## 2024-10-29 VITALS
OXYGEN SATURATION: 98 % | RESPIRATION RATE: 18 BRPM | DIASTOLIC BLOOD PRESSURE: 59 MMHG | HEART RATE: 72 BPM | HEIGHT: 67 IN | SYSTOLIC BLOOD PRESSURE: 108 MMHG | WEIGHT: 149 LBS | BODY MASS INDEX: 23.39 KG/M2 | TEMPERATURE: 98 F

## 2024-10-29 DIAGNOSIS — I10 HTN (HYPERTENSION), BENIGN: ICD-10-CM

## 2024-10-29 DIAGNOSIS — R06.02 SHORTNESS OF BREATH: Primary | ICD-10-CM

## 2024-10-29 DIAGNOSIS — E78.2 MIXED HYPERLIPIDEMIA: ICD-10-CM

## 2024-10-29 DIAGNOSIS — I44.7 LEFT BUNDLE BRANCH BLOCK: ICD-10-CM

## 2024-10-29 LAB
OHS QRS DURATION: 148 MS
OHS QTC CALCULATION: 477 MS

## 2024-10-29 PROCEDURE — 93005 ELECTROCARDIOGRAM TRACING: CPT

## 2024-10-29 PROCEDURE — 1160F RVW MEDS BY RX/DR IN RCRD: CPT | Mod: CPTII,,, | Performed by: NURSE PRACTITIONER

## 2024-10-29 PROCEDURE — 1159F MED LIST DOCD IN RCRD: CPT | Mod: CPTII,,, | Performed by: NURSE PRACTITIONER

## 2024-10-29 PROCEDURE — 3078F DIAST BP <80 MM HG: CPT | Mod: CPTII,,, | Performed by: NURSE PRACTITIONER

## 2024-10-29 PROCEDURE — 4010F ACE/ARB THERAPY RXD/TAKEN: CPT | Mod: CPTII,,, | Performed by: NURSE PRACTITIONER

## 2024-10-29 PROCEDURE — 3008F BODY MASS INDEX DOCD: CPT | Mod: CPTII,,, | Performed by: NURSE PRACTITIONER

## 2024-10-29 PROCEDURE — 99214 OFFICE O/P EST MOD 30 MIN: CPT | Mod: S$PBB,,, | Performed by: NURSE PRACTITIONER

## 2024-10-29 PROCEDURE — 99215 OFFICE O/P EST HI 40 MIN: CPT | Mod: PBBFAC,25 | Performed by: NURSE PRACTITIONER

## 2024-10-29 PROCEDURE — 3074F SYST BP LT 130 MM HG: CPT | Mod: CPTII,,, | Performed by: NURSE PRACTITIONER

## 2024-12-08 DIAGNOSIS — I10 HTN (HYPERTENSION), BENIGN: ICD-10-CM

## 2024-12-09 DIAGNOSIS — K59.04 CHRONIC IDIOPATHIC CONSTIPATION: Primary | ICD-10-CM

## 2024-12-09 RX ORDER — LISINOPRIL 5 MG/1
5 TABLET ORAL
Qty: 90 TABLET | Refills: 0 | Status: SHIPPED | OUTPATIENT
Start: 2024-12-09

## 2024-12-09 RX ORDER — POLYETHYLENE GLYCOL 3350, SODIUM SULFATE, SODIUM CHLORIDE, POTASSIUM CHLORIDE, SODIUM ASCORBATE, AND ASCORBIC ACID 7.5-2.691G
KIT ORAL
Qty: 1 KIT | Refills: 0 | Status: SHIPPED | OUTPATIENT
Start: 2024-12-09

## 2024-12-12 ENCOUNTER — OFFICE VISIT (OUTPATIENT)
Dept: FAMILY MEDICINE | Facility: CLINIC | Age: 59
End: 2024-12-12
Payer: MEDICAID

## 2024-12-12 VITALS
SYSTOLIC BLOOD PRESSURE: 126 MMHG | HEIGHT: 67 IN | RESPIRATION RATE: 18 BRPM | HEART RATE: 83 BPM | DIASTOLIC BLOOD PRESSURE: 83 MMHG | BODY MASS INDEX: 22.94 KG/M2 | TEMPERATURE: 98 F | WEIGHT: 146.19 LBS | OXYGEN SATURATION: 100 %

## 2024-12-12 DIAGNOSIS — Z11.59 NEED FOR HEPATITIS C SCREENING TEST: ICD-10-CM

## 2024-12-12 DIAGNOSIS — E04.2 MULTIPLE THYROID NODULES: ICD-10-CM

## 2024-12-12 DIAGNOSIS — Z12.31 ENCOUNTER FOR SCREENING MAMMOGRAM FOR MALIGNANT NEOPLASM OF BREAST: ICD-10-CM

## 2024-12-12 DIAGNOSIS — Z78.0 POSTMENOPAUSAL: ICD-10-CM

## 2024-12-12 DIAGNOSIS — I10 HTN (HYPERTENSION), BENIGN: ICD-10-CM

## 2024-12-12 DIAGNOSIS — T07.XXXA MULTIPLE FRACTURE: ICD-10-CM

## 2024-12-12 DIAGNOSIS — G71.11 MYOTONIC DYSTROPHY: Primary | ICD-10-CM

## 2024-12-12 PROCEDURE — 1160F RVW MEDS BY RX/DR IN RCRD: CPT | Mod: CPTII,,, | Performed by: FAMILY MEDICINE

## 2024-12-12 PROCEDURE — 99215 OFFICE O/P EST HI 40 MIN: CPT | Mod: PBBFAC | Performed by: FAMILY MEDICINE

## 2024-12-12 PROCEDURE — 4010F ACE/ARB THERAPY RXD/TAKEN: CPT | Mod: CPTII,,, | Performed by: FAMILY MEDICINE

## 2024-12-12 PROCEDURE — 3079F DIAST BP 80-89 MM HG: CPT | Mod: CPTII,,, | Performed by: FAMILY MEDICINE

## 2024-12-12 PROCEDURE — 1159F MED LIST DOCD IN RCRD: CPT | Mod: CPTII,,, | Performed by: FAMILY MEDICINE

## 2024-12-12 PROCEDURE — 99214 OFFICE O/P EST MOD 30 MIN: CPT | Mod: S$PBB,,, | Performed by: FAMILY MEDICINE

## 2024-12-12 PROCEDURE — 3074F SYST BP LT 130 MM HG: CPT | Mod: CPTII,,, | Performed by: FAMILY MEDICINE

## 2024-12-12 PROCEDURE — 3008F BODY MASS INDEX DOCD: CPT | Mod: CPTII,,, | Performed by: FAMILY MEDICINE

## 2024-12-12 NOTE — PROGRESS NOTES
Patient Name: Cheryl Bauer   : 1965  MRN: [id number]     SUBJECTIVE:  Cheryl Bauer is a 59 y.o. female here for Follow-up (The patient would like an order for bone density test because she keeps breaking bones. Patient had a fall on Tuesday, and she says falls are happening regularly.)  .    HPI  Here for above complaints.  Patient states that again on Oct 29th had a fall and went to urgent care and was diagnosed with another fracture of same area, right foot where she had the previous fracture in .   Then saw her 's ortho Dr. and was told it's healing well. No records at all.   Patient states that she does not have any pain anymore, believes it has healed well.  No issues with bearing weight either.  She follows with Neurology for myotonic dystrophy and was advised to have a DEXA scan checked.  Due to myotinic dystrophy, weakness of muscles, multiple falls.  Uses walker to ambulate.    Regarding hypertension, well-controlled on lisinopril 5 mg daily.  Follows with cardiology and has regular echocardiogram and stress test as recommended by them, due to underlying myotonic dystrophy.  Colonoscopy - repeat in 3 years. Follows with GI.  Has it scheduled.  Last mammogram normal.  Due in 2 weeks.  Will place order.      ALLERGIES:   Review of patient's allergies indicates:   Allergen Reactions    Phenytoin sodium extended Hives and Other (See Comments)         ROS:  Review of Systems   Constitutional:  Negative for chills and fever.   HENT:  Negative for congestion.    Respiratory:  Negative for cough and shortness of breath.    Cardiovascular:  Negative for chest pain, palpitations and leg swelling.   Gastrointestinal:  Negative for abdominal pain, blood in stool, diarrhea, nausea and vomiting.   Genitourinary:  Negative for dysuria and hematuria.   Neurological:  Positive for weakness (generalized muscles due to underlying condition). Negative for dizziness and headaches.  "  Psychiatric/Behavioral:  Negative for depression. The patient is not nervous/anxious.          OBJECTIVE:  Vital signs  Vitals:    12/12/24 0806   BP: 126/83   Pulse: 83   Resp: 18   Temp: 98.1 °F (36.7 °C)   TempSrc: Oral   SpO2: 100%   Weight: 66.3 kg (146 lb 3.2 oz)   Height: 5' 7" (1.702 m)      Body mass index is 22.9 kg/m².    PHYSICAL EXAM:   Physical Exam  Vitals reviewed.   Constitutional:       General: She is not in acute distress.     Appearance: Normal appearance. She is not ill-appearing.   HENT:      Head: Normocephalic and atraumatic.      Nose: Nose normal. No rhinorrhea.      Mouth/Throat:      Mouth: Mucous membranes are moist.   Eyes:      General: No scleral icterus.        Right eye: No discharge.         Left eye: No discharge.      Conjunctiva/sclera: Conjunctivae normal.      Pupils: Pupils are equal, round, and reactive to light.   Cardiovascular:      Rate and Rhythm: Normal rate and regular rhythm.   Pulmonary:      Effort: Pulmonary effort is normal. No respiratory distress.      Breath sounds: No wheezing, rhonchi or rales.   Abdominal:      General: There is no distension.      Palpations: Abdomen is soft.      Tenderness: There is no abdominal tenderness.   Musculoskeletal:      Cervical back: Normal range of motion and neck supple. No rigidity or tenderness.      Right lower leg: No edema.      Left lower leg: No edema.      Comments: Using a walker for ambulation   Skin:     General: Skin is warm.      Findings: No rash.   Neurological:      Mental Status: She is alert and oriented to person, place, and time.   Psychiatric:         Mood and Affect: Mood normal.         Behavior: Behavior normal.          ASSESSMENT/PLAN:  1. Myotonic dystrophy  Continue to follow up with Neurology.  Having underlying myotonic dystrophy, generalized weakness due to it and few falls, has had fractures of the toes.  Check a DEXA scan.  In the meantime advised patient to start taking vitamin-D " over-the-counter and will also check a vitamin-D level.  -     DXA Bone Density Axial Skeleton 1 or more sites; Future; Expected date: 12/12/2024  -     vitamin-D, future    2. Multiple fracture  As above.  Healing well toe fracture.  Follows with orthopedics.  Asymptomatic, no need for repeat x-ray for now.  -     DXA Bone Density Axial Skeleton 1 or more sites; Future; Expected date: 12/12/2024    3. Postmenopausal  See 1.   -     DXA Bone Density Axial Skeleton 1 or more sites; Future; Expected date: 12/12/2024    4. HTN (hypertension), benign  -     Comprehensive Metabolic Panel; Future; Expected date: 12/12/2024  -     CBC Auto Differential; Future; Expected date: 12/12/2024  -     Hemoglobin A1C; Future; Expected date: 12/12/2024  -     TSH; Future; Expected date: 12/12/2024  -     Lipid Panel; Future; Expected date: 12/12/2024    5. Multiple thyroid nodules  -     TSH; Future; Expected date: 12/12/2024  -     US Soft Tissue Head Neck; Future; Expected date: 12/12/2024    6. Encounter for screening mammogram for malignant neoplasm of breast  -     Mammo Digital Screening Bilat w/ Vladimir; Future; Expected date: 12/12/2024    7. Need for hepatitis C screening test  -     Hepatitis C Antibody; Future; Expected date: 12/12/2024             Previous medical history/lab work/radiology reviewed and considered during medical management decisions.   Medication list reviewed and medication reconciliation performed.  Patient was provided  and care about his/her current diagnosis (es) and medications including risk/benefit and side effects/adverse events, over the counter medication uses/doses, home self-care and contact precautions,  and red flags and indications for when to seek immediate medical attention.   Patient was advised to continue compliance with current medication list and medical recommendations.  Recommended/ Advised continued compliance with recommended eating habits/ diets for medical conditions and  exercise 150 minutes/ week (if possible) for medical condition (s).        RESULTS:  No results found for this or any previous visit (from the past 6 weeks).      Follow Up:  Follow up in about 6 months (around 6/12/2025).     [unfilled]    This note was created with the assistance of a voice recognition software or phone dictation. There may be transcription errors as a result of using this technology however minimal. Effort has been made to assure accuracy of transcription but any obvious errors or omissions should be clarified with the author of the document

## 2024-12-20 ENCOUNTER — HOSPITAL ENCOUNTER (OUTPATIENT)
Dept: CARDIOLOGY | Facility: HOSPITAL | Age: 59
Discharge: HOME | End: 2024-12-20
Attending: NURSE PRACTITIONER
Payer: MEDICAID

## 2024-12-20 ENCOUNTER — HOSPITAL ENCOUNTER (OUTPATIENT)
Dept: RADIOLOGY | Facility: HOSPITAL | Age: 59
Discharge: HOME | End: 2024-12-20
Attending: NURSE PRACTITIONER
Payer: MEDICAID

## 2024-12-20 ENCOUNTER — OFFICE VISIT (OUTPATIENT)
Dept: GASTROENTEROLOGY | Facility: CLINIC | Age: 59
End: 2024-12-20
Payer: MEDICAID

## 2024-12-20 VITALS
DIASTOLIC BLOOD PRESSURE: 74 MMHG | SYSTOLIC BLOOD PRESSURE: 107 MMHG | HEIGHT: 67 IN | WEIGHT: 143 LBS | BODY MASS INDEX: 22.44 KG/M2

## 2024-12-20 VITALS
HEIGHT: 67 IN | RESPIRATION RATE: 16 BRPM | OXYGEN SATURATION: 99 % | SYSTOLIC BLOOD PRESSURE: 106 MMHG | TEMPERATURE: 98 F | BODY MASS INDEX: 22.44 KG/M2 | HEART RATE: 81 BPM | WEIGHT: 143 LBS | DIASTOLIC BLOOD PRESSURE: 74 MMHG

## 2024-12-20 DIAGNOSIS — Z86.0100 HISTORY OF COLONIC POLYPS: ICD-10-CM

## 2024-12-20 DIAGNOSIS — K59.04 CHRONIC IDIOPATHIC CONSTIPATION: ICD-10-CM

## 2024-12-20 DIAGNOSIS — R06.02 SHORTNESS OF BREATH: ICD-10-CM

## 2024-12-20 DIAGNOSIS — K31.89 REACTIVE GASTROPATHY: Primary | ICD-10-CM

## 2024-12-20 LAB
APICAL FOUR CHAMBER EJECTION FRACTION: 63 %
APICAL TWO CHAMBER EJECTION FRACTION: 69 %
AV INDEX (PROSTH): 0.47
AV MEAN GRADIENT: 3.3 MMHG
AV PEAK GRADIENT: 5.8 MMHG
AV VALVE AREA BY VELOCITY RATIO: 1.8 CM²
AV VALVE AREA: 1.5 CM²
AV VELOCITY RATIO: 0.58
BSA FOR ECHO PROCEDURE: 1.75 M2
CV ECHO LV RWT: 0.53 CM
DOP CALC AO PEAK VEL: 1.2 M/S
DOP CALC AO VTI: 31.6 CM
DOP CALC LVOT AREA: 3.1 CM2
DOP CALC LVOT DIAMETER: 2 CM
DOP CALC LVOT PEAK VEL: 0.7 M/S
DOP CALC LVOT STROKE VOLUME: 47.1 CM3
DOP CALC MV VTI: 12.7 CM
DOP CALCLVOT PEAK VEL VTI: 15 CM
E WAVE DECELERATION TIME: 135.61 MSEC
E/A RATIO: 0.87
ECHO LV POSTERIOR WALL: 1 CM (ref 0.6–1.1)
FRACTIONAL SHORTENING: 39.5 % (ref 28–44)
HR MV ECHO: 68 BPM
INTERVENTRICULAR SEPTUM: 1.1 CM (ref 0.6–1.1)
IVC DIAMETER: 1.3 CM
LEFT ATRIUM AREA SYSTOLIC (APICAL 4 CHAMBER): 8.99 CM2
LEFT ATRIUM SIZE: 3.2 CM
LEFT INTERNAL DIMENSION IN SYSTOLE: 2.3 CM (ref 2.1–4)
LEFT VENTRICLE DIASTOLIC VOLUME INDEX: 34.31 ML/M2
LEFT VENTRICLE DIASTOLIC VOLUME: 60.05 ML
LEFT VENTRICLE END DIASTOLIC VOLUME APICAL 2 CHAMBER: 46.65 ML
LEFT VENTRICLE END DIASTOLIC VOLUME APICAL 4 CHAMBER: 37.16 ML
LEFT VENTRICLE END SYSTOLIC VOLUME APICAL 4 CHAMBER: 16.71 ML
LEFT VENTRICLE MASS INDEX: 71.9 G/M2
LEFT VENTRICLE SYSTOLIC VOLUME INDEX: 10.3 ML/M2
LEFT VENTRICLE SYSTOLIC VOLUME: 17.95 ML
LEFT VENTRICULAR INTERNAL DIMENSION IN DIASTOLE: 3.8 CM (ref 3.5–6)
LEFT VENTRICULAR MASS: 125.8 G
LV LATERAL E/E' RATIO: 6.71 M/S
LVED V (TEICH): 60.05 ML
LVES V (TEICH): 17.95 ML
LVOT MG: 1.12 MMHG
LVOT MV: 0.49 CM/S
MV MEAN GRADIENT: 1 MMHG
MV PEAK A VEL: 0.54 M/S
MV PEAK E VEL: 0.47 M/S
MV PEAK GRADIENT: 2 MMHG
MV VALVE AREA BY CONTINUITY EQUATION: 3.71 CM2
OHS LV EJECTION FRACTION SIMPSONS BIPLANE MOD: 67 %
PISA MRMAX VEL: 2.93 M/S
PISA TR MAX VEL: 2.42 M/S
RA MAJOR: 3.32 CM
RA PRESSURE ESTIMATED: 8 MMHG
RV TB RVSP: 10 MMHG
TDI LATERAL: 0.07 M/S
TR MAX PG: 23 MMHG
TRICUSPID ANNULAR PLANE SYSTOLIC EXCURSION: 1.51 CM
TV REST PULMONARY ARTERY PRESSURE: 31 MMHG
Z-SCORE OF LEFT VENTRICULAR DIMENSION IN END DIASTOLE: -2.42
Z-SCORE OF LEFT VENTRICULAR DIMENSION IN END SYSTOLE: -2.09

## 2024-12-20 PROCEDURE — 93306 TTE W/DOPPLER COMPLETE: CPT

## 2024-12-20 PROCEDURE — 99214 OFFICE O/P EST MOD 30 MIN: CPT | Mod: PBBFAC | Performed by: NURSE PRACTITIONER

## 2024-12-20 PROCEDURE — 74019 RADEX ABDOMEN 2 VIEWS: CPT | Mod: TC

## 2024-12-20 RX ORDER — LUBIPROSTONE 24 UG/1
24 CAPSULE ORAL 2 TIMES DAILY
Qty: 60 CAPSULE | Refills: 5 | Status: SHIPPED | OUTPATIENT
Start: 2024-12-20

## 2024-12-20 NOTE — ASSESSMENT & PLAN NOTE
Colonoscopy November 17, 2021 revealed hemorrhoids found on perianal exam, examined portion of ileum normal, multiple fragments of adenomatous polyps x5 in the ascending colon with polypectomy and no evidence of malignancy, multiple fragments of adenomatous polyps x2 in the transverse colon with polypectomy and no evidence of malignancy, multiple fragments of adenomatous polyps x2 in the descending colon with polypectomy and no evidence of malignancy, and adenomatous polyps x3 in the sigmoid colon with polypectomy and no evidence of malignancy.  She was recommended repeat colonoscopy in 3 years.  Colonoscopy scheduled January 27, 2025

## 2024-12-20 NOTE — PROGRESS NOTES
Subjective:       Patient ID: Cheryl Bauer is a 59 y.o. female.    Chief Complaint: Constipation and Abdominal Pain (Has not had any BM x 2 weeks. )    This 59-year-old  female with hypertension, hypertriglyceridemia, multinodular goiter s/p right thyroidectomy and isthmus, myotonic muscular dystrophy, left bundle branch block, and colon polyps presents accompanied by her significant other for a follow-up visit.  She was initially seen March 18, 2021 and referred for alternating bowel habits at that time.  She reported intermittent epigastric abdominal pain for the past 1-1/2 months described as aching, cramping, and nonradiating.  The pain was worsened with eating and she was unable to identify specific food triggers.  She denied relieving factors.  She had occasional acid reflux and pyrosis and denied taking anything for symptomatic relief.  Her appetite was good and her weight was stable.  She denied nocturnal symptoms, fever, chills, nausea, vomiting, odynophagia, dysphagia, or early satiety.  She reported fluctuating bowel habits between loose to watery stools 5-6 times per day for 2-3 consecutive days followed by constipation with frequent straining and occasional bright red rectal bleeding with bowel movements noted on the tissue after wiping for 3 to 5 days before the diarrhea started again.  She denied melena, fecal urgency, fecal incontinence, tenesmus, or pain with defecation.  She denied taking anything for treatment of constipation or diarrhea.     Laboratory results March 9, 2021 revealed fecal calprotectin 29, pancreatic elastase 371, and negative stool culture, ova and parasite, Giardia, fecal leukocyte, rotavirus, and Cryptosporidium.       Abdominal ultrasound April 7, 2021 revealed diffuse hepatic steatosis and normal gallbladder.       She underwent EGD and colonoscopy November 17, 2021.  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  Pathology revealed mild chronic gastritis with no dysplasia identified and no Helicobacter pylori organisms identified.  Colonoscopy revealed hemorrhoids found on perianal exam, examined portion of ileum normal, multiple fragments of adenomatous polyps x5 in the ascending colon with polypectomy and no evidence of malignancy, multiple fragments of adenomatous polyps x2 in the transverse colon with polypectomy and no evidence of malignancy, multiple fragments of adenomatous polyps x2 in the descending colon with polypectomy and no evidence of malignancy, and adenomatous polyps x3 in the sigmoid colon with polypectomy and no evidence of malignancy.  She was recommended repeat colonoscopy in 3 years.     She was seen for a follow-up visit July 13, 2023 and reported minimal change in symptoms from initial office visit in March 2021.  She felt as though epigastric abdominal pain had worsened over the past few months and was described aching, cramping, and radiating down above her umbilical region.  She was unable to identify specific triggers or relieving factors.  She denied appetite changes, unintentional weight loss, fever, chills, nausea, vomiting, hematemesis, odynophagia, dysphagia, acid reflux, or pyrosis.  She had occasional early satiety.  Bowel habits continued to fluctuate between loose stools and formed to soft stools 3-5 times daily.  She denied taking anything for symptomatic relief as she would get constipated.  She denied melena, hematochezia, fecal urgency, fecal incontinence, or pain with defecation.     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  Pathology revealed reactive gastritis/gastropathy, negative for H pylori organisms, negative for  dysplasia.     She was seen for a follow-up visit June 20, 2024.  She continued to take omeprazole 40 mg daily.  She reported taking MiraLax 17 g daily with worsening constipation noted over the past 6 months.  She reported going up to 15 days without any bowel movement over the past few weeks and that her sister had to go to her home to administer an enema.  She did have some relief with enema administration.  She had difficulty passing stool with minimal urge to go to the restroom over the past 6 months.  She denied melena, hematochezia, fecal urgency, fecal incontinence, or pain with defecation.  Her appetite was fair and her weight had fluctuated.  She denied fever, chills, nausea, vomiting, hematemesis, odynophagia, dysphagia, acid reflux, pyrosis, early satiety, or abdominal pain.  She was advised to start Linzess 145 mcg daily with an okay for enema use 1-2 times per week as needed for rectal stimulation.    Colonoscopy is scheduled January 27, 2025.    Today, she presents for a follow-up visit.  She reports taking Linzess 145 mcg daily after last office visit June 20, 2024 with good relief of bowel movements and regular bowel movements for approximately 2-3 weeks.  She then began having more urgency and diarrhea with frequent loose stools and began taking the Linzess approximately 1-2 times weekly.  She stopped taking the Linzess altogether after having minimal relief and began having regular bowel movements on her own without having to take anything.  She then became constipated again and reports that her last full bowel movement was December 3, 2024.  She has associated gas, borborygmi, and generalized abdominal cramping.  She will have an urge to have a bowel movement but does not pass any stool.  She denies melena, hematochezia, fecal incontinence, or pain with defecation.  Her appetite is decreased and her weight has decreased over time.  She is 143 lb and was 155 lb June 20, 2024.  She denies fever,  chills, nausea, vomiting, hematemesis, odynophagia, dysphagia, acid reflux, pyrosis, or early satiety.     She underwent colonoscopy August 2, 2017 with findings of tubulovillous adenomatous polyp at the cecum with no evidence of malignancy, tubular adenomatous polyp at 30 cm with no evidence of malignancy, tubular adenomatous polyp at 43 cm with no evidence of malignancy.  Three cecal polyps were noted with the largest measuring 1.5 cm.  Biopsies were obtained of the 2 smaller polyps, and the 1.5 cm polyp was removed in its entirety by snare.  She was recommended repeat colonoscopy in 3 years.  She denies regular NSAID use or use of blood thinners.  She denies tobacco use and drinks 5-6 beers per day.  She is a former smoker.  She denies a family history of IBD, colon polyps, or colon cancer.      Review of patient's allergies indicates:   Allergen Reactions    Phenytoin sodium extended Hives and Other (See Comments)     Past Medical History:   Diagnosis Date    COPD (chronic obstructive pulmonary disease)     Hypertension     Muscular dystrophy      Past Surgical History:   Procedure Laterality Date    CARPAL TUNNEL RELEASE Left     EGD, WITH CLOSED BIOPSY N/A 11/1/2023    Procedure: EGD, WITH CLOSED BIOPSY;  Surgeon: Lacy Miles MD;  Location: [facility] ENDOSCOPY;  Service: Gastroenterology;  Laterality: N/A;    HYSTERECTOMY      KNEE SURGERY Left     THYROIDECTOMY Right     TONSILLECTOMY AND ADENOIDECTOMY      TUBAL LIGATION       Family History:   family history includes Diabetes in her father; Heart attack (age of onset: 68) in her father; Heart disease in her father; Heart failure in her father; Hypertension in her father.    Social History:    reports that she quit smoking about 15 years ago. Her smoking use included cigarettes. She has never used smokeless tobacco. She reports current alcohol use of about 3.0 standard drinks of alcohol per week. She reports that she does not use drugs.    Review of  Systems  Negative except as noted in the HPI.      Objective:      Physical Exam  Constitutional:       Appearance: Normal appearance.      Comments: Seated in wheelchair   HENT:      Head: Normocephalic.      Mouth/Throat:      Mouth: Mucous membranes are moist.   Eyes:      Extraocular Movements: Extraocular movements intact.      Conjunctiva/sclera: Conjunctivae normal.      Pupils: Pupils are equal, round, and reactive to light.   Cardiovascular:      Rate and Rhythm: Normal rate and regular rhythm.      Pulses: Normal pulses.      Heart sounds: Normal heart sounds.   Pulmonary:      Effort: Pulmonary effort is normal.      Breath sounds: Normal breath sounds.   Abdominal:      General: Bowel sounds are normal.      Palpations: Abdomen is soft.      Comments: Generalized abdominal tenderness noted with deep palpation, no rebound or guarding   Musculoskeletal:         General: Normal range of motion.      Cervical back: Normal range of motion and neck supple.   Skin:     General: Skin is warm and dry.   Neurological:      General: No focal deficit present.      Mental Status: She is alert and oriented to person, place, and time.   Psychiatric:         Mood and Affect: Mood normal.         Behavior: Behavior normal.         Thought Content: Thought content normal.         Judgment: Judgment normal.         Home Medications:     Current Outpatient Medications   Medication Sig    lisinopriL (PRINIVIL,ZESTRIL) 5 MG tablet Take 1 tablet by mouth once daily    mexiletine (MEXITIL) 200 MG Cap Take 200 mg by mouth every 8 (eight) hours.    nitroGLYCERIN (NITROSTAT) 0.4 MG SL tablet Place 1 tablet (0.4 mg total) under the tongue every 5 (five) minutes as needed for Chest pain.    omega-3 fatty acids/fish oil (FISH OIL-OMEGA-3 FATTY ACIDS) 300-1,000 mg capsule Take 3 capsules by mouth 2 (two) times a day.    paroxetine (PAXIL) 10 MG tablet Take 10 mg by mouth once daily.    polyethylene glycol (MOVIPREP) 100-7.5-2.691 gram  solution Take as directed prior to colonoscopy    traMADoL (ULTRAM) 50 mg tablet Take 50 mg by mouth every 6 (six) hours as needed.    albuterol (PROVENTIL/VENTOLIN HFA) 90 mcg/actuation inhaler Inhale 1 puff into the lungs every 4 (four) hours as needed. (Patient not taking: Reported on 12/20/2024)    ANORO ELLIPTA 62.5-25 mcg/actuation DsDv Inhale 1 puff into the lungs once daily. (Patient not taking: Reported on 12/20/2024)    lubiprostone (AMITIZA) 24 MCG Cap Take 1 capsule (24 mcg total) by mouth 2 (two) times daily.    omeprazole (PRILOSEC) 40 MG capsule Take 1 capsule (40 mg total) by mouth every morning. (Patient not taking: Reported on 12/20/2024)    polyethylene glycol 3350 (MIRALAX ORAL) Take by mouth. (Patient not taking: Reported on 12/20/2024)     No current facility-administered medications for this visit.     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  Pathology revealed reactive gastritis/gastropathy, negative for H pylori organisms, negative for dysplasia.  GERD lifestyle modifications  Reflux precautions  Limit NSAID use  Recommend alcohol cessation and continued tobacco cessation  Continue omeprazole 40 mg as directed   Call with updates   Follow-up clinic visit with NP in 4 months         Chronic idiopathic constipation     Recommend soluble fiber supplementation  Avoid straining or sitting on the toilet for long periods of time  Recommend she start Amitiza 24 mcg twice daily   CBC, CMP, TSH   Abdominal x-ray  Call with updates         Relevant Medications    lubiprostone (AMITIZA) 24 MCG Cap    Other Relevant Orders    CBC Auto Differential    Comprehensive Metabolic Panel    X-Ray Abdomen Flat And Erect    TSH    History  of colonic polyps     Colonoscopy November 17, 2021 revealed hemorrhoids found on perianal exam, examined portion of ileum normal, multiple fragments of adenomatous polyps x5 in the ascending colon with polypectomy and no evidence of malignancy, multiple fragments of adenomatous polyps x2 in the transverse colon with polypectomy and no evidence of malignancy, multiple fragments of adenomatous polyps x2 in the descending colon with polypectomy and no evidence of malignancy, and adenomatous polyps x3 in the sigmoid colon with polypectomy and no evidence of malignancy.  She was recommended repeat colonoscopy in 3 years.  Colonoscopy scheduled January 27, 2025

## 2024-12-20 NOTE — ASSESSMENT & PLAN NOTE
Recommend soluble fiber supplementation  Avoid straining or sitting on the toilet for long periods of time  Recommend she start Amitiza 24 mcg twice daily   CBC, CMP, TSH   Abdominal x-ray  Call with updates

## 2024-12-20 NOTE — ASSESSMENT & PLAN NOTE
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  Pathology revealed reactive gastritis/gastropathy, negative for H pylori organisms, negative for dysplasia.  GERD lifestyle modifications  Reflux precautions  Limit NSAID use  Recommend alcohol cessation and continued tobacco cessation  Continue omeprazole 40 mg as directed   Call with updates   Follow-up clinic visit with NP in 4 months

## 2024-12-24 ENCOUNTER — TELEPHONE (OUTPATIENT)
Dept: CARDIOLOGY | Facility: CLINIC | Age: 59
End: 2024-12-24
Payer: MEDICAID

## 2024-12-24 NOTE — TELEPHONE ENCOUNTER
Discussed results of Echocardiogram with the patient.  All questions answered.  Patient verbalizes understanding.

## 2024-12-31 ENCOUNTER — HOSPITAL ENCOUNTER (OUTPATIENT)
Dept: RADIOLOGY | Facility: HOSPITAL | Age: 59
Discharge: HOME | End: 2024-12-31
Attending: FAMILY MEDICINE
Payer: MEDICAID

## 2024-12-31 DIAGNOSIS — E04.1 THYROID NODULE: Primary | ICD-10-CM

## 2024-12-31 DIAGNOSIS — E04.2 MULTIPLE THYROID NODULES: ICD-10-CM

## 2024-12-31 DIAGNOSIS — Z78.0 POSTMENOPAUSAL: ICD-10-CM

## 2024-12-31 DIAGNOSIS — T07.XXXA MULTIPLE FRACTURE: ICD-10-CM

## 2024-12-31 DIAGNOSIS — G71.11 MYOTONIC DYSTROPHY: ICD-10-CM

## 2024-12-31 DIAGNOSIS — Z12.31 ENCOUNTER FOR SCREENING MAMMOGRAM FOR MALIGNANT NEOPLASM OF BREAST: ICD-10-CM

## 2024-12-31 PROCEDURE — 76536 US EXAM OF HEAD AND NECK: CPT | Mod: TC

## 2024-12-31 PROCEDURE — 77067 SCR MAMMO BI INCL CAD: CPT | Mod: TC

## 2024-12-31 PROCEDURE — 77080 DXA BONE DENSITY AXIAL: CPT | Mod: TC

## 2024-12-31 NOTE — PROGRESS NOTES
Please inform the patient that the recent ultrasound of her neck shows some thyroid nodules that need further evaluation. I have placed a referral for these areas to be biopsied. That clinic will reach out to schedule an appointment after the holiday.    Thanks,    LATRELL Hdz

## 2024-12-31 NOTE — PROGRESS NOTES
Please inform the patient that there recent Bone Density screening test came back and the results were normal. Pt should start / continue daily calcium and vitamin D supplements, exercise, and a healthy diet. We will repeat this exam in 2 years as recommended.     Thanks,    LATRELL Hdz

## 2025-01-03 DIAGNOSIS — E55.9 VITAMIN D DEFICIENCY: Primary | ICD-10-CM

## 2025-01-03 RX ORDER — ASPIRIN 325 MG
50000 TABLET, DELAYED RELEASE (ENTERIC COATED) ORAL
Qty: 12 CAPSULE | Refills: 1 | Status: SHIPPED | OUTPATIENT
Start: 2025-01-03 | End: 2025-07-02

## 2025-01-06 ENCOUNTER — TELEPHONE (OUTPATIENT)
Dept: FAMILY MEDICINE | Facility: CLINIC | Age: 60
End: 2025-01-06
Payer: MEDICAID

## 2025-01-06 NOTE — TELEPHONE ENCOUNTER
Called patient via phone call and patient understands results given. No further questions at this time.   ----- Message from LATRELL Hdz sent at 12/31/2024  2:16 PM CST -----  Please inform the patient that the recent ultrasound of her neck shows some thyroid nodules that need further evaluation. I have placed a referral for these areas to be biopsied. That clinic will reach out to schedule an appointment after the holiday.    Thanks,    LATRELL Hdz

## 2025-01-06 NOTE — TELEPHONE ENCOUNTER
Called patient via phone call and patient understands results given. No further questions at this time.     ----- Message from LATRELL Hdz sent at 12/31/2024  2:13 PM CST -----  Please inform the patient that there recent Bone Density screening test came back and the results were normal. Pt should start / continue daily calcium and vitamin D supplements, exercise, and a healthy diet. We will repeat this exam in 2 years as recommended.     Thanks,    LATRELL Hdz

## 2025-01-06 NOTE — TELEPHONE ENCOUNTER
Called patient via phone call and patient understands results given. No further questions at this time.     ----- Message from LATRELL Pak sent at 1/3/2025 11:25 AM CST -----  Please inform Cheryl Bauer of the following:    Vitamin D is low, will send weekly vitamin D3 50,000 iu to take.     Thank you,    HENRI DowneyC

## 2025-01-24 ENCOUNTER — ANESTHESIA EVENT (OUTPATIENT)
Dept: ENDOSCOPY | Facility: HOSPITAL | Age: 60
End: 2025-01-24
Payer: MEDICAID

## 2025-01-24 RX ORDER — SODIUM CHLORIDE, SODIUM LACTATE, POTASSIUM CHLORIDE, CALCIUM CHLORIDE 600; 310; 30; 20 MG/100ML; MG/100ML; MG/100ML; MG/100ML
INJECTION, SOLUTION INTRAVENOUS CONTINUOUS
OUTPATIENT
Start: 2025-01-24

## 2025-01-24 NOTE — ANESTHESIA PREPROCEDURE EVALUATION
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    NO BETA BLOCKER USE    Active Ambulatory Problems     Diagnosis Date Noted    Shortness of breath 10/18/2022    Left bundle branch block 12/05/2022    HTN (hypertension), benign 12/05/2022    Hyperlipidemia 12/05/2022    History of colonic polyps 07/13/2023    Reactive gastropathy 06/20/2024    Chronic idiopathic constipation 06/20/2024     Resolved Ambulatory Problems     Diagnosis Date Noted    Epigastric abdominal pain 07/13/2023    Early satiety 07/13/2023    Irregular bowel habits 07/13/2023     Past Medical History:   Diagnosis Date    COPD (chronic obstructive pulmonary disease)     Hypertension     Muscular dystrophy      Pre-op Assessment    I have reviewed the NPO Status.      Review of Systems  Anesthesia Hx:  No problems with previous Anesthesia                Social:  Former Smoker       Cardiovascular:     Hypertension, well controlled           hyperlipidemia                               Pulmonary:   COPD, severe                     Renal/:  Renal/ Normal                 Hepatic/GI:  Hepatic/GI Normal                    Neurological:    Neuromuscular Disease,                                   Endocrine:  Endocrine Normal              Vitals:    01/13/25 1409 01/27/25 1153   BP:  (!) 142/97   BP Location:  Left arm   Patient Position:  Lying   Pulse:  73   Resp:  16   Temp:  36.9 °C (98.4 °F)   TempSrc:  Oral   SpO2:  96%   Weight: 63 kg (139 lb) 64 kg (141 lb)   Height: 5' 7" (1.702 m) 5' 7" (1.702 m)         Physical Exam  General: Cooperative, Well nourished and Alert    Airway:  Mallampati: II / II  Mouth Opening: Normal  TM Distance: Normal  Tongue: Normal  Neck ROM: Normal " ROM    Dental:  Intact    Chest/Lungs:  Clear to auscultation, Normal Respiratory Rate    Heart:  Rate: Normal  Rhythm: Regular Rhythm  Sounds: Normal      Lab Results   Component Value Date    WBC 5.93 12/31/2024    HGB 16.2 (H) 12/31/2024    HCT 49.2 (H) 12/31/2024    MCV 96.7 (H) 12/31/2024     12/31/2024       CMP  Sodium   Date Value Ref Range Status   12/31/2024 142 136 - 145 mmol/L Final     Potassium   Date Value Ref Range Status   12/31/2024 4.6 3.5 - 5.1 mmol/L Final     Chloride   Date Value Ref Range Status   12/31/2024 104 98 - 107 mmol/L Final     CO2   Date Value Ref Range Status   12/31/2024 26 22 - 29 mmol/L Final     Blood Urea Nitrogen   Date Value Ref Range Status   12/31/2024 9.0 (L) 9.8 - 20.1 mg/dL Final     Creatinine   Date Value Ref Range Status   12/31/2024 0.66 0.55 - 1.02 mg/dL Final     Calcium   Date Value Ref Range Status   12/31/2024 9.2 8.4 - 10.2 mg/dL Final     Albumin   Date Value Ref Range Status   12/31/2024 3.7 3.5 - 5.0 g/dL Final     Bilirubin Total   Date Value Ref Range Status   12/31/2024 0.6 <=1.5 mg/dL Final     ALP   Date Value Ref Range Status   12/31/2024 74 40 - 150 unit/L Final     AST   Date Value Ref Range Status   12/31/2024 30 5 - 34 unit/L Final     ALT   Date Value Ref Range Status   12/31/2024 25 0 - 55 unit/L Final     eGFR   Date Value Ref Range Status   12/31/2024 >60 mL/min/1.73/m2 Final         CARDS OV 10/29/24                Anesthesia Plan  Type of Anesthesia, risks & benefits discussed:    Anesthesia Type: Gen Natural Airway  Intra-op Monitoring Plan: Standard ASA Monitors  Post Op Pain Control Plan: IV/PO Opioids PRN  Induction:  IV  Informed Consent: Informed consent signed with the Patient and all parties understand the risks and agree with anesthesia plan.  All questions answered. Patient consented to blood products? No  ASA Score: 4  Day of Surgery Review of History & Physical: H&P Update referred to the surgeon/provider.    Ready For  Surgery From Anesthesia Perspective.     .

## 2025-01-27 ENCOUNTER — ANESTHESIA (OUTPATIENT)
Dept: ENDOSCOPY | Facility: HOSPITAL | Age: 60
End: 2025-01-27
Payer: MEDICAID

## 2025-01-27 ENCOUNTER — HOSPITAL ENCOUNTER (OUTPATIENT)
Facility: HOSPITAL | Age: 60
Discharge: HOME | End: 2025-01-27
Attending: INTERNAL MEDICINE | Admitting: INTERNAL MEDICINE
Payer: MEDICAID

## 2025-01-27 DIAGNOSIS — Z86.0100 PERSONAL HISTORY OF COLONIC POLYPS: ICD-10-CM

## 2025-01-27 DIAGNOSIS — Z86.0100 HISTORY OF COLONIC POLYPS: ICD-10-CM

## 2025-01-27 PROCEDURE — 45385 COLONOSCOPY W/LESION REMOVAL: CPT | Performed by: INTERNAL MEDICINE

## 2025-01-27 PROCEDURE — 37000009 HC ANESTHESIA EA ADD 15 MINS: Performed by: INTERNAL MEDICINE

## 2025-01-27 PROCEDURE — 63600175 PHARM REV CODE 636 W HCPCS: Performed by: NURSE ANESTHETIST, CERTIFIED REGISTERED

## 2025-01-27 PROCEDURE — 37000008 HC ANESTHESIA 1ST 15 MINUTES: Performed by: INTERNAL MEDICINE

## 2025-01-27 PROCEDURE — D9220A PRA ANESTHESIA: Mod: ,,, | Performed by: NURSE ANESTHETIST, CERTIFIED REGISTERED

## 2025-01-27 PROCEDURE — 88305 TISSUE EXAM BY PATHOLOGIST: CPT | Mod: TC | Performed by: INTERNAL MEDICINE

## 2025-01-27 PROCEDURE — 27201423 OPTIME MED/SURG SUP & DEVICES STERILE SUPPLY: Performed by: INTERNAL MEDICINE

## 2025-01-27 RX ORDER — LIDOCAINE HYDROCHLORIDE 20 MG/ML
INJECTION, SOLUTION EPIDURAL; INFILTRATION; INTRACAUDAL; PERINEURAL
Status: DISCONTINUED | OUTPATIENT
Start: 2025-01-27 | End: 2025-01-27

## 2025-01-27 RX ORDER — PROPOFOL 10 MG/ML
VIAL (ML) INTRAVENOUS
Status: DISCONTINUED | OUTPATIENT
Start: 2025-01-27 | End: 2025-01-27

## 2025-01-27 RX ADMIN — PROPOFOL 200 MCG/KG/MIN: 10 INJECTION, EMULSION INTRAVENOUS at 01:01

## 2025-01-27 RX ADMIN — LIDOCAINE HYDROCHLORIDE 50 MG: 20 INJECTION, SOLUTION EPIDURAL; INFILTRATION; INTRACAUDAL; PERINEURAL at 01:01

## 2025-01-27 RX ADMIN — PROPOFOL 70 MG: 10 INJECTION, EMULSION INTRAVENOUS at 01:01

## 2025-01-27 NOTE — H&P
Colonoscopy History and Physical    Patient Name: Cheryl Bauer  MRN: [id number]  : 1965  Date of Procedure:  2025  Referring Physician: Karma Wong FNP  Primary Physician: Poonam Javier MD  Procedure Physician: Lacy Miles MD, MPH     Procedure - Colonoscopy  ASA - per anesthesia  Mallampati - per anesthesia  History of Anesthesia problems - no  Family history Anesthesia problems -  no   Plan of anesthesia - General    Diagnosis: previous adenomatous polyp  Chief Complaint: Same as above    HPI: Patient is an 60 y.o. female is here for the above.     Ms. Bauer is a 60-year-old  female with hypertension, multinodular goiter s/p right thyroidectomy, myotonic muscular dystrophy here for a colonoscopy.    She has had difficulty with fluctuating bowel habits with predominant constipation.  Stools studies negative in the past including fecal calprotectin, pancreatic elastase, and negative stool culture, ova and parasite, Giardia, fecal leukocyte, rotavirus, and Cryptosporidium.      Overall she has constipation, previously given Linzess (too much abdominal pain), amitiza (abdominal pain) which limited its use.  She does not feel like it helped much.  She then started taking miralax and senna which gave her bowel movements but caused loose stools.  She stopped taking both and lately has been having daily to every other day stools.  She denies any rectal bleeding or melena.        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  Pathology revealed reactive HP negative gastritis/gastropathy.    2021: EGD: ectopic gastric mucosa at the cricopharyngeus,  erythematous mucosa in the pre-pyloric region of the stomach, and normal examined duodenum.  Pathology revealed HP negative. mild chronic gastritis.     2021: Colonoscopy revealed hemorrhoids found  on perianal exam, normal TI, 12 adenomatous polyps removed.  She was recommended repeat colonoscopy in 3 years.    August 2, 2017 Colonoscopy: 1.5 cm cecal tubulovillous adenomatous polyp, tubular adenomatous polyp at 30 cm with no evidence of malignancy, tubular adenomatous polyp at 43 cm with no evidence of malignancy.  Three cecal polyps were noted with the largest measuring 1.5 cm.  Biopsies were obtained of the 2 smaller polyps, and the 1.5 cm polyp was removed in its entirety by snare.  She was recommended repeat colonoscopy in 3 years.         She denies regular NSAID use or use of blood thinners.  She denies tobacco use and drinks 5-6 beers per day.  She is a former smoker.  She denies a family history of IBD, colon polyps, or colon cancer.    Last colonoscopy: 2021  Family history: denies  Anticoagulation: none    ROS:  Constitutional: No fevers, chills, No weight loss  CV: No chest pain  Pulm: No cough, No shortness of breath  GI: see HPI    Medical History:   Past Medical History:   Diagnosis Date    COPD (chronic obstructive pulmonary disease)     Hypertension     Muscular dystrophy          Surgical History:   Past Surgical History:   Procedure Laterality Date    CARPAL TUNNEL RELEASE Left     EGD, WITH CLOSED BIOPSY N/A 11/1/2023    Procedure: EGD, WITH CLOSED BIOPSY;  Surgeon: Lacy Miles MD;  Location: [facility] ENDOSCOPY;  Service: Gastroenterology;  Laterality: N/A;    HYSTERECTOMY      KNEE SURGERY Left     THYROIDECTOMY Right     TONSILLECTOMY AND ADENOIDECTOMY      TUBAL LIGATION         Family History:   Family History   Problem Relation Name Age of Onset    Hypertension Father      Heart failure Father      Heart disease Father      Heart attack Father  68    Diabetes Father     .    Social History:   Social History     Socioeconomic History    Marital status:    Tobacco Use    Smoking status: Former     Current packs/day: 0.00     Types: Cigarettes     Quit date: 01/2009     Years  since quittin.0    Smokeless tobacco: Never    Tobacco comments:     Quit 15 years ago   Substance and Sexual Activity    Alcohol use: Yes     Alcohol/week: 3.0 standard drinks of alcohol     Types: 3 Cans of beer per week     Comment: daily    Drug use: Never    Sexual activity: Yes       Review of patient's allergies indicates:   Allergen Reactions    Phenytoin sodium extended Hives and Other (See Comments)       Medications:   Medications Prior to Admission   Medication Sig Dispense Refill Last Dose/Taking    albuterol (PROVENTIL/VENTOLIN HFA) 90 mcg/actuation inhaler Inhale 1 puff into the lungs every 4 (four) hours as needed.   Past Week    ANORO ELLIPTA 62.5-25 mcg/actuation DsDv Inhale 1 puff into the lungs once daily.   Past Week    cholecalciferol, vitamin D3, 1,250 mcg (50,000 unit) capsule Take 1 capsule (50,000 Units total) by mouth every 7 days. 12 capsule 1 2025    lisinopriL (PRINIVIL,ZESTRIL) 5 MG tablet Take 1 tablet by mouth once daily 90 tablet 0 2025    lubiprostone (AMITIZA) 24 MCG Cap Take 1 capsule (24 mcg total) by mouth 2 (two) times daily. 60 capsule 5 2025    mexiletine (MEXITIL) 200 MG Cap Take 200 mg by mouth every 8 (eight) hours.   2025    omega-3 fatty acids/fish oil (FISH OIL-OMEGA-3 FATTY ACIDS) 300-1,000 mg capsule Take 3 capsules by mouth 2 (two) times a day.   2025    paroxetine (PAXIL) 10 MG tablet Take 10 mg by mouth once daily.   2025    polyethylene glycol (MOVIPREP) 100-7.5-2.691 gram solution Take as directed prior to colonoscopy 1 kit 0 2025    nitroGLYCERIN (NITROSTAT) 0.4 MG SL tablet Place 1 tablet (0.4 mg total) under the tongue every 5 (five) minutes as needed for Chest pain. 25 tablet 3 Unknown    omeprazole (PRILOSEC) 40 MG capsule Take 1 capsule (40 mg total) by mouth every morning. (Patient not taking: Reported on 10/29/2024) 90 capsule 3 Not Taking    polyethylene glycol 3350 (MIRALAX ORAL) Take by mouth. (Patient not  "taking: Reported on 1/13/2025)   Not Taking    traMADoL (ULTRAM) 50 mg tablet Take 50 mg by mouth every 6 (six) hours as needed.   More than a month         Physical Exam:    Vital Signs:   Vitals:    01/27/25 1153   BP: (!) 142/97   Pulse: 73   Resp: 16   Temp: 98.4 °F (36.9 °C)     BP (!) 142/97 (BP Location: Left arm, Patient Position: Lying)   Pulse 73   Temp 98.4 °F (36.9 °C) (Oral)   Resp 16   Ht 5' 7" (1.702 m)   Wt 64 kg (141 lb)   SpO2 96%   BMI 22.08 kg/m²     General:          Well appearing in no acute distress  Lungs: Clear to auscultation bilaterally, respirations unlabored  Heart: Regular rate and rhythm, S1 and S2 normal, no obvious murmurs  Abdomen:         Soft, non-tender, bowel sounds normal, no masses, no organomegaly        Labs:  Lab Results   Component Value Date    WBC 5.93 12/31/2024    HGB 16.2 (H) 12/31/2024    HCT 49.2 (H) 12/31/2024    MCV 96.7 (H) 12/31/2024     12/31/2024     No results found for: "INR", "PT", "APTT"  Lab Results   Component Value Date     12/31/2024    K 4.6 12/31/2024    CO2 26 12/31/2024    BUN 9.0 (L) 12/31/2024    CREATININE 0.66 12/31/2024    LABPROT 6.6 12/31/2024    ALBUMIN 3.7 12/31/2024    BILITOT 0.6 12/31/2024    ALKPHOS 74 12/31/2024    ALT 25 12/31/2024    AST 30 12/31/2024         Assessment and Plan:    History reviewed, vital signs satisfactory, cardiopulmonary status satisfactory.  I have explained the sedation options, risks, benefits, and alternatives of this endoscopic procedure to the patient including but not limited to bleeding, inflammation, infection, perforation, and death.  All questions were answered and the patient consented to proceed with procedure as planned.   The patient is deemed an appropriate candidate for the sedation as planned.      Lacy Miles MD, MPH   of Clinical Medicine  Gastroenterology and Hepatology  [facility]    1/27/2025  12:05 PM "

## 2025-01-27 NOTE — PLAN OF CARE
Dr. Miles in room giving results of procedure. Patient and family verbalized understanding. Patient states ready for discharge. Patient states she can dress self. Family at side.

## 2025-01-27 NOTE — ANESTHESIA POSTPROCEDURE EVALUATION
Anesthesia Post Evaluation    Patient: Cheryl Bauer    Procedure(s) Performed: Procedure(s) (LRB):  COLONOSCOPY, WITH POLYPECTOMY USING SNARE (N/A)    Final Anesthesia Type: general      Patient location during evaluation: GI PACU  Patient participation: Yes- Able to Participate  Level of consciousness: awake and alert  Post-procedure vital signs: reviewed and stable  Pain management: adequate  Airway patency: patent    PONV status at discharge: No PONV  Anesthetic complications: no      Cardiovascular status: hemodynamically stable  Respiratory status: unassisted and room air  Follow-up not needed.              Vitals Value Taken Time   /97 01/27/25 1153   Temp 36.9 °C (98.4 °F) 01/27/25 1153   Pulse 73 01/27/25 1153   Resp 16 01/27/25 1153   SpO2 96 % 01/27/25 1153         No case tracking events are documented in the log.      Pain/Jennifer Score: Jennifer Score: 9 (1/27/2025  1:41 PM)

## 2025-01-27 NOTE — TRANSFER OF CARE
"anesthesia Transfer of Care Note    Patient: Cheryl Bauer    Procedure(s) Performed: Procedure(s) (LRB):  COLONOSCOPY, WITH POLYPECTOMY USING SNARE (N/A)    Patient location: GI    Anesthesia Type: general    Post pain: adequate analgesia    Post assessment: no apparent anesthetic complications and tolerated procedure well    Post vital signs: stable    Level of consciousness: awake    Nausea/Vomiting: no nausea/vomiting    Complications: none    Transfer of care protocol was followed      Last vitals: Visit Vitals  BP (!) 142/97 (BP Location: Left arm, Patient Position: Lying)   Pulse 73   Temp 36.9 °C (98.4 °F) (Oral)   Resp 16   Ht 5' 7" (1.702 m)   Wt 64 kg (141 lb)   SpO2 96%   Breastfeeding No   BMI 22.08 kg/m²     "

## 2025-01-28 VITALS
HEART RATE: 61 BPM | DIASTOLIC BLOOD PRESSURE: 86 MMHG | RESPIRATION RATE: 20 BRPM | HEIGHT: 67 IN | WEIGHT: 141 LBS | SYSTOLIC BLOOD PRESSURE: 122 MMHG | TEMPERATURE: 98 F | BODY MASS INDEX: 22.13 KG/M2 | OXYGEN SATURATION: 96 %

## 2025-01-29 LAB
ESTROGEN SERPL-MCNC: NORMAL PG/ML
INSULIN SERPL-ACNC: NORMAL U[IU]/ML
LAB AP CLINICAL INFORMATION: NORMAL
LAB AP GROSS DESCRIPTION: NORMAL
LAB AP REPORT FOOTNOTES: NORMAL
T3RU NFR SERPL: NORMAL %

## 2025-03-12 ENCOUNTER — HOSPITAL ENCOUNTER (OUTPATIENT)
Dept: RADIOLOGY | Facility: HOSPITAL | Age: 60
Discharge: HOME | End: 2025-03-12
Attending: NURSE PRACTITIONER
Payer: MEDICAID

## 2025-03-12 ENCOUNTER — HOSPITAL ENCOUNTER (OUTPATIENT)
Dept: CARDIOLOGY | Facility: HOSPITAL | Age: 60
Discharge: HOME | End: 2025-03-12
Attending: NURSE PRACTITIONER
Payer: MEDICAID

## 2025-03-12 VITALS
DIASTOLIC BLOOD PRESSURE: 71 MMHG | BODY MASS INDEX: 22.15 KG/M2 | WEIGHT: 141.13 LBS | SYSTOLIC BLOOD PRESSURE: 125 MMHG | RESPIRATION RATE: 20 BRPM | HEIGHT: 67 IN | HEART RATE: 70 BPM

## 2025-03-12 DIAGNOSIS — I44.7 LEFT BUNDLE BRANCH BLOCK: ICD-10-CM

## 2025-03-12 DIAGNOSIS — R06.02 SHORTNESS OF BREATH: ICD-10-CM

## 2025-03-12 LAB
CV STRESS BASE HR: 68 BPM
DIASTOLIC BLOOD PRESSURE: 71 MMHG
NUC REST EJECTION FRACTION: 89
NUC STRESS EJECTION FRACTION: 86 %
OHS CV CPX 85 PERCENT MAX PREDICTED HEART RATE MALE: 137
OHS CV CPX ESTIMATED METS: 1
OHS CV CPX MAX PREDICTED HEART RATE: 161
OHS CV CPX PATIENT IS FEMALE: 1
OHS CV CPX PATIENT IS MALE: 0
OHS CV CPX PEAK DIASTOLIC BLOOD PRESSURE: 67 MMHG
OHS CV CPX PEAK HEAR RATE: 96 BPM
OHS CV CPX PEAK RATE PRESSURE PRODUCT: NORMAL
OHS CV CPX PEAK SYSTOLIC BLOOD PRESSURE: 128 MMHG
OHS CV CPX PERCENT MAX PREDICTED HEART RATE ACHIEVED: 62
OHS CV CPX RATE PRESSURE PRODUCT PRESENTING: 8500
OHS CV INITIAL DOSE: 31.9 MCG/KG/MIN
OHS CV PEAK DOSE: 10.8 MCG/KG/MIN
OHS QRS DURATION: 104 MS
OHS QTC CALCULATION: 501 MS
STRESS ECHO POST EXERCISE DUR MIN: 0 MINUTES
STRESS ECHO POST EXERCISE DUR SEC: 41 SECONDS
SYSTOLIC BLOOD PRESSURE: 125 MMHG

## 2025-03-12 PROCEDURE — 78452 HT MUSCLE IMAGE SPECT MULT: CPT

## 2025-03-12 PROCEDURE — 93017 CV STRESS TEST TRACING ONLY: CPT

## 2025-03-12 PROCEDURE — A9502 TC99M TETROFOSMIN: HCPCS | Performed by: NURSE PRACTITIONER

## 2025-03-12 PROCEDURE — 63600175 PHARM REV CODE 636 W HCPCS: Performed by: NURSE PRACTITIONER

## 2025-03-12 RX ORDER — REGADENOSON 0.08 MG/ML
0.4 INJECTION, SOLUTION INTRAVENOUS
Status: COMPLETED | OUTPATIENT
Start: 2025-03-12 | End: 2025-03-12

## 2025-03-12 RX ADMIN — REGADENOSON 0.4 MG: 0.08 INJECTION, SOLUTION INTRAVENOUS at 08:03

## 2025-03-12 RX ADMIN — TETROFOSMIN 31.9 MILLICURIE: 1.38 INJECTION, POWDER, LYOPHILIZED, FOR SOLUTION INTRAVENOUS at 10:03

## 2025-03-12 RX ADMIN — TETROFOSMIN 10.8 MILLICURIE: 1.38 INJECTION, POWDER, LYOPHILIZED, FOR SOLUTION INTRAVENOUS at 08:03

## 2025-03-13 ENCOUNTER — OFFICE VISIT (OUTPATIENT)
Dept: CARDIOLOGY | Facility: CLINIC | Age: 60
End: 2025-03-13
Payer: MEDICAID

## 2025-03-13 VITALS
TEMPERATURE: 99 F | BODY MASS INDEX: 22.63 KG/M2 | HEART RATE: 73 BPM | WEIGHT: 144.19 LBS | SYSTOLIC BLOOD PRESSURE: 117 MMHG | OXYGEN SATURATION: 95 % | DIASTOLIC BLOOD PRESSURE: 73 MMHG | RESPIRATION RATE: 18 BRPM | HEIGHT: 67 IN

## 2025-03-13 DIAGNOSIS — R06.02 SHORTNESS OF BREATH: ICD-10-CM

## 2025-03-13 DIAGNOSIS — Z87.891 FORMER TOBACCO USE: ICD-10-CM

## 2025-03-13 DIAGNOSIS — G71.00 MUSCULAR DYSTROPHY: ICD-10-CM

## 2025-03-13 DIAGNOSIS — I10 HTN (HYPERTENSION), BENIGN: ICD-10-CM

## 2025-03-13 DIAGNOSIS — E78.2 MIXED HYPERLIPIDEMIA: ICD-10-CM

## 2025-03-13 DIAGNOSIS — I44.7 LEFT BUNDLE BRANCH BLOCK: ICD-10-CM

## 2025-03-13 DIAGNOSIS — R07.89 OTHER CHEST PAIN: ICD-10-CM

## 2025-03-13 DIAGNOSIS — R94.39 ABNORMAL NUCLEAR STRESS TEST: Primary | ICD-10-CM

## 2025-03-13 PROCEDURE — 1160F RVW MEDS BY RX/DR IN RCRD: CPT | Mod: CPTII,,, | Performed by: NURSE PRACTITIONER

## 2025-03-13 PROCEDURE — 99214 OFFICE O/P EST MOD 30 MIN: CPT | Mod: S$PBB,,, | Performed by: NURSE PRACTITIONER

## 2025-03-13 PROCEDURE — 3008F BODY MASS INDEX DOCD: CPT | Mod: CPTII,,, | Performed by: NURSE PRACTITIONER

## 2025-03-13 PROCEDURE — 1159F MED LIST DOCD IN RCRD: CPT | Mod: CPTII,,, | Performed by: NURSE PRACTITIONER

## 2025-03-13 PROCEDURE — 99215 OFFICE O/P EST HI 40 MIN: CPT | Mod: PBBFAC | Performed by: NURSE PRACTITIONER

## 2025-03-13 PROCEDURE — 3078F DIAST BP <80 MM HG: CPT | Mod: CPTII,,, | Performed by: NURSE PRACTITIONER

## 2025-03-13 PROCEDURE — 3074F SYST BP LT 130 MM HG: CPT | Mod: CPTII,,, | Performed by: NURSE PRACTITIONER

## 2025-03-13 RX ORDER — METOPROLOL SUCCINATE 25 MG/1
25 TABLET, EXTENDED RELEASE ORAL DAILY
Qty: 30 TABLET | Refills: 11 | Status: SHIPPED | OUTPATIENT
Start: 2025-03-13 | End: 2026-03-13

## 2025-03-13 RX ORDER — DIPHENHYDRAMINE HCL 25 MG
25 CAPSULE ORAL
OUTPATIENT
Start: 2025-03-13

## 2025-03-13 RX ORDER — SODIUM CHLORIDE 9 MG/ML
INJECTION, SOLUTION INTRAVENOUS ONCE
OUTPATIENT
Start: 2025-03-13 | End: 2025-03-13

## 2025-03-13 RX ORDER — DIAZEPAM 5 MG/1
5 TABLET ORAL
Status: ACTIVE | OUTPATIENT
Start: 2025-03-13

## 2025-03-13 NOTE — PROGRESS NOTES
CHIEF COMPLAINT:   Chief Complaint   Patient presents with    Here for Stress test results     Patient c/o having SOB relieved with rest                   Review of patient's allergies indicates:   Allergen Reactions    Phenytoin sodium extended Hives and Other (See Comments)                                          HPI:  Cheryl Bauer 60 y.o. female with PMH of myotonic dystrophy, multinodular goiter, left bundle branch block, hypertension, hyperlipidemia, COPD, (wears home oxygen 2 LNC qhs), colon polyps and former tobacco use who presents to cardiology clinic for ongoing care and results of testing. Patient is followed in cardiology clinic due to her history of myotonic dystrophy.  She follows a neurologist/myotonic dystrophy physician in [place], [place], Dr. Haines, who recommends a EKG and Echocardiogram every 6 months along with an annual Nuclear Stress Test.  Nuclear stress test obtained on 3.12.25 revealed a moderate-to-severe intensity, medium-sized, mostly reversible perfusion abnormality with some fixed areas in the basal to mid lateral wall in the typical distribution of the left circumflex territory.  To note, the patient has had multiple nuclear stress past dating back to 2021 that were normal.  Most recent Echocardiogram completed on 12.20.24 demonstrated estimated EF of 67%, no significant valvular abnormalities and a small pericardial effusion with no evidence of cardiac tamponade. (See full reports below)    Today the patient endorses intermittent episodes of chest pain that she states occurs at random at least 1 to 2 times a week.  She describes it as a left-sided chest tightness that occurs underneath her breasts at times without any associating or aggravating symptoms.  She states the chest pain may last 5 minutes before self terminating.  She denies the use of sublingual nitroglycerin.  She also reports a slight progression in her exertional dyspnea when ambulating around her home  utilizing a walker.  However, the patient does continue to use a wheelchair for longer distances.  To note, the patient's activity is significantly limited at baseline due to her muscular dystrophy.  However she states that she is able to perform her basic ADLs without angina or angina equivalent symptoms.  She denies any other cardiovascular complaints of palpitations, orthopnea, PND, peripheral edema, claudication, near-syncope or syncope.  She continues to endorse intermittent mechanical falls that occurred because her legs reportedly gave out randomly, due to her muscular dystrophy.     Denies smoking. Quit 12 years prior. Former smoker of 1 pack/day x 20 years.  Drinks 3 natural light beers per day  Denies drug use                                        CARDIAC TESTING:   Nuclear Stress Test 3.12.25    Abnormal myocardial perfusion scan.    There is a moderate to severe intensity, medium sized, mostly reversible perfusion abnormality with some fixed areas in the basal to mid lateral wall(s) in the typical distribution of the LCX territory.    There are no other significant perfusion abnormalities.    The gated perfusion images showed an ejection fraction of 89% at rest. The gated perfusion images showed an ejection fraction of 86% post stress.    The patient reported no chest pain during the stress test.    There were no arrhythmias during stress.     The nuclear stress test is  fair quality.    On the nuclear stress test the EF is normal.  If the patient has an echo, the EF on the echo is considered more accurate.         The patient has a moderate risk nuclear stress test due to lateral reversible defect.     If clinically indicated, consider further evaluation with coronary angiogram.       ECHO 12.20.24    Left Ventricle: The left ventricle is normal in size. Mild septal thickening. There is normal systolic function. Quantitated ejection fraction is 67%.    Right Ventricle: Normal right ventricular cavity  size. Systolic function is normal.    Pulmonary Artery: The estimated pulmonary artery systolic pressure is 31 mmHg.    IVC/SVC: Intermediate venous pressure at 8 mmHg.    Pericardium: There is a small effusion. No indication of cardiac tamponade.         ECHO 6.20.24    Left Ventricle: The left ventricle is normal in size. Normal wall thickness. There is normal systolic function. Biplane (2D) method of discs ejection fraction is 65%.    Right Ventricle: Systolic function is normal.    IVC/SVC: Normal venous pressure at 3 mmHg.    Pericardium: There is a trivial circumferential effusion.    Nuclear Stress Test 3.12.24    Normal myocardial perfusion scan. There is no evidence of myocardial ischemia or infarction.    The gated perfusion images showed an ejection fraction of 86% at rest. The gated perfusion images showed an ejection fraction of 87% post stress.    The patient reported no chest pain during the stress test.    There were no arrhythmias during stress.     The nuclear stress test is  good quality.    On the nuclear stress test the EF is supranormal.  If the patient has an echo, the EF on the echo is considered more accurate.         The patient has a low risk nuclear stress test      Nuclear stress test indicates no ischemia.    ECHO 12.12.23    Left Ventricle: The left ventricle is normal in size. Normal wall thickness. There is normal systolic function. Biplane (2D) method of discs ejection fraction is 65%.    Right Ventricle: Normal right ventricular cavity size. Systolic function is mildly reduced.    IVC/SVC: Normal venous pressure at 3 mmHg    Nuclear Stress Test - Normal myocardial perfusion scan.  There is no evidence of myocardial ischemia or infarction (3.14.23)  Nuclear Stress Test - Normal myocardial perfusion study with no evidence of ischemia or scar (3.28.22)  Stress Echocardiogram in May 2019 which was negative for inducible ischemia.                 ECHO EF- 60%, normal left ventricular  diastolic function and no significant valvular abnormalities (6.14.23)  ECHO -EF- 59%, PASP is estimated to be normal, no significant valvular abnormalities, in comparison to previous echocardiogram performed on 10/26/2021 there were no significant changes.   (4.14.22)  Echocardiogram 10/2021 EF 50-55%, mildly elevated PASP  Echocardiogram from 3/10/2021 with EF 50-55%    30 day event monitor - Sinus rhythm with average HR 84 bpm, minimum 60 bpm, and max 137 bpm.  There were no significant pauses, VT, SVT or AFib.  4. Reported episodes the patient was noted to be in sinus. (April 2022)  30-day event monitor from December 2020 with no significant arrhythmias.  IDALIA testing in November 2019 which revealed no evidence of significant arterial insufficiency.                                                                                                                                                                                                                                                            Patient Active Problem List   Diagnosis    Shortness of breath    Left bundle branch block    HTN (hypertension), benign    Hyperlipidemia    History of colonic polyps    Reactive gastropathy    Chronic idiopathic constipation     Past Surgical History:   Procedure Laterality Date    CARPAL TUNNEL RELEASE Left     COLONOSCOPY, WITH POLYPECTOMY USING SNARE N/A 1/27/2025    Procedure: COLONOSCOPY, WITH POLYPECTOMY USING SNARE;  Surgeon: Lacy Miles MD;  Location: [facility] ENDOSCOPY;  Service: Gastroenterology;  Laterality: N/A;  Due November 2024    EGD, WITH CLOSED BIOPSY N/A 11/1/2023    Procedure: EGD, WITH CLOSED BIOPSY;  Surgeon: Lacy Miles MD;  Location: [facility] ENDOSCOPY;  Service: Gastroenterology;  Laterality: N/A;    HYSTERECTOMY      KNEE SURGERY Left     THYROIDECTOMY Right     TONSILLECTOMY AND ADENOIDECTOMY      TUBAL LIGATION       Social History     Socioeconomic History    Marital status:     Tobacco Use    Smoking status: Former     Current packs/day: 0.00     Types: Cigarettes     Quit date: 2009     Years since quittin.2    Smokeless tobacco: Never    Tobacco comments:     Quit 15 years ago   Substance and Sexual Activity    Alcohol use: Yes     Alcohol/week: 3.0 standard drinks of alcohol     Types: 3 Cans of beer per week     Comment: daily    Drug use: Never    Sexual activity: Yes        Family History   Problem Relation Name Age of Onset    Hypertension Father      Heart failure Father      Heart disease Father      Heart attack Father  68    Diabetes Father           Current Outpatient Medications:     albuterol (PROVENTIL/VENTOLIN HFA) 90 mcg/actuation inhaler, Inhale 1 puff into the lungs every 4 (four) hours as needed., Disp: , Rfl:     ANORO ELLIPTA 62.5-25 mcg/actuation DsDv, Inhale 1 puff into the lungs once daily., Disp: , Rfl:     cholecalciferol, vitamin D3, 1,250 mcg (50,000 unit) capsule, Take 1 capsule (50,000 Units total) by mouth every 7 days., Disp: 12 capsule, Rfl: 1    lisinopriL (PRINIVIL,ZESTRIL) 5 MG tablet, Take 1 tablet by mouth once daily, Disp: 90 tablet, Rfl: 0    mexiletine (MEXITIL) 200 MG Cap, Take 200 mg by mouth every 8 (eight) hours., Disp: , Rfl:     nitroGLYCERIN (NITROSTAT) 0.4 MG SL tablet, Place 1 tablet (0.4 mg total) under the tongue every 5 (five) minutes as needed for Chest pain., Disp: 25 tablet, Rfl: 3    omega-3 fatty acids/fish oil (FISH OIL-OMEGA-3 FATTY ACIDS) 300-1,000 mg capsule, Take 3 capsules by mouth 2 (two) times a day., Disp: , Rfl:     paroxetine (PAXIL) 10 MG tablet, Take 10 mg by mouth once daily., Disp: , Rfl:     traMADoL (ULTRAM) 50 mg tablet, Take 50 mg by mouth every 6 (six) hours as needed., Disp: , Rfl:     omeprazole (PRILOSEC) 40 MG capsule, Take 1 capsule (40 mg total) by mouth every morning. (Patient not taking: Reported on 3/13/2025), Disp: 90 capsule, Rfl: 3    polyethylene glycol 3350 (MIRALAX ORAL), Take by  "mouth. (Patient not taking: Reported on 12/20/2024), Disp: , Rfl:      ROS:                                                                                                                                                                             Review of Systems   Constitutional: Negative.    HENT: Negative.     Eyes: Negative.    Respiratory:  Positive for shortness of breath.    Cardiovascular:  Positive for chest pain. Negative for palpitations.   Gastrointestinal: Negative.    Genitourinary: Negative.    Musculoskeletal: Negative.    Skin: Negative.    Neurological: Negative.    Endo/Heme/Allergies: Negative.    Psychiatric/Behavioral: Negative.          Blood pressure 117/73, pulse 73, temperature 98.5 °F (36.9 °C), temperature source Oral, resp. rate 18, height 5' 7" (1.702 m), weight 65.4 kg (144 lb 3.2 oz), SpO2 95%.   PE:  Physical Exam  HENT:      Head: Normocephalic.      Nose: Nose normal.   Eyes:      Pupils: Pupils are equal, round, and reactive to light.   Cardiovascular:      Rate and Rhythm: Normal rate and regular rhythm.   Pulmonary:      Effort: Pulmonary effort is normal.   Abdominal:      General: Abdomen is flat. Bowel sounds are normal.      Palpations: Abdomen is soft.   Musculoskeletal:         General: Normal range of motion.      Cervical back: Normal range of motion.   Skin:     General: Skin is warm.   Neurological:      General: No focal deficit present.      Mental Status: She is alert.   Psychiatric:         Mood and Affect: Mood normal.              ASSESSMENT/PLAN:  Abnormal Nuclear Stress Test   Left bundle branch block in the setting of muscular dystrophy  - Moderate-to-severe intensity, medium-sized, mostly reversible perfusion abnormality with some fixed areas in the basal to mid lateral wall in the typical distribution of the left circumflex territory (3.12.25)  - Normal myocardial perfusion scan. There is no evidence of myocardial ischemia or infarction. (3.12.24)  - " Normal myocardial perfusion study with no evidence of ischemia or scar (3.14.23)  - Normal myocardial perfusion study with no evidence of ischemia or scar (3.28.22)  - Lexiscan stress test- negative for ischemia ( 5.6.21)  - Reports intermittent left-sided chest tightness that occurs at random at least 1 to 2 times a week.  Denies any associating or aggravating symptoms.  Reports symptoms made lasts up to 5 minutes before self terminating.  Denies any sublingual nitroglycerin use.  Also endorses increased exertional dyspnea with her routine activities.  - Discussed abnormal nuclear stress test with staff interventional cardiologist.  Will proceed with Left Heart Catheterization given worsening anginal symptoms and abnormal nuclear stress test.  The patient also has multiple risk factors of HTN, HLD, former tobacco use, and family history of heart disease  - Schedule Left Heart Catheterization  - Discussed risks/benefits and alternatives with the patient and they are agreeable to proceed  - Continue SL Nitro PRN CP.  Will add metoprolol succinate 25 mg due to ongoing anginal symptoms  - NV for BP/HR check with preop visit   - Recommended EKG/Echo every 6 months and stress test annually per patient's neurologist Dr. Haines. Fax Number: [phone]  - Last Echocardiogram obtained in December 2024.    DOVE- progressed    - EF-67%, no significant valvular abnormalities, small pericardial effusion with no indication of cardiac tamponade per ECHO 12.20.24  - EF-65%, no significant valvular abnormalities per ECHO 6.20.24  - EF- 65%, no significant valvular abnormalities per ECHO 12.12.23  - EF - 60% per ECHO 6.14.23  - Reports worsening exertional dyspnea    COPD (wear oxygen 2 L nasal cannula at night)  - Continue management per P pulmonology    Hypertension  - BP at goal 117/73  - Continue lisinopril 5 mg daily  - Counseled on low salt diet and activity as tolerated      Hyperlipidemia  - Statin and zetia contraindicated  due to muscular dystrophy  - Continue omega-3  - Management per PCP    Muscular dystrophy  - Management per neurologist in [place], Dr. Haines.    Add metoprolol succinate 25 mg due to continued anginal symptoms  NV for BP/HR check with preop visit  Schedule Left Heart Catheterization  Follow up in cardiology clinic postprocedure  Follow up with PCP and neurologist as directed  Strict ED precautions

## 2025-03-13 NOTE — PATIENT INSTRUCTIONS
Add metoprolol succinate 25 mg  NV for BP/HR check with preop visit  Schedule Left Heart Catheterization  Follow up in cardiology clinic postprocedure  Follow up with PCP and neurologist as directed  Strict ED precautions

## 2025-03-14 RX ORDER — LISINOPRIL 5 MG/1
5 TABLET ORAL DAILY
Qty: 90 TABLET | Refills: 2 | Status: SHIPPED | OUTPATIENT
Start: 2025-03-14 | End: 2026-03-14

## 2025-04-01 ENCOUNTER — CLINICAL SUPPORT (OUTPATIENT)
Dept: CARDIOLOGY | Facility: CLINIC | Age: 60
End: 2025-04-01
Payer: MEDICAID

## 2025-04-01 VITALS
DIASTOLIC BLOOD PRESSURE: 59 MMHG | RESPIRATION RATE: 20 BRPM | WEIGHT: 145.38 LBS | OXYGEN SATURATION: 98 % | SYSTOLIC BLOOD PRESSURE: 104 MMHG | HEART RATE: 83 BPM | BODY MASS INDEX: 22.82 KG/M2 | TEMPERATURE: 98 F | HEIGHT: 67 IN

## 2025-04-01 DIAGNOSIS — Z87.891 FORMER TOBACCO USE: ICD-10-CM

## 2025-04-01 DIAGNOSIS — G71.00 MUSCULAR DYSTROPHY: ICD-10-CM

## 2025-04-01 DIAGNOSIS — I44.7 LEFT BUNDLE BRANCH BLOCK: ICD-10-CM

## 2025-04-01 DIAGNOSIS — R07.89 OTHER CHEST PAIN: ICD-10-CM

## 2025-04-01 DIAGNOSIS — R06.02 SHORTNESS OF BREATH: ICD-10-CM

## 2025-04-01 DIAGNOSIS — E78.2 MIXED HYPERLIPIDEMIA: ICD-10-CM

## 2025-04-01 DIAGNOSIS — I10 HTN (HYPERTENSION), BENIGN: ICD-10-CM

## 2025-04-01 DIAGNOSIS — R94.39 ABNORMAL NUCLEAR STRESS TEST: ICD-10-CM

## 2025-04-01 LAB
ANION GAP SERPL CALC-SCNC: 2 MEQ/L
APTT PPP: 29.2 SECONDS (ref 23.2–33.7)
BASOPHILS # BLD AUTO: 0.04 X10(3)/MCL
BASOPHILS NFR BLD AUTO: 0.5 %
BUN SERPL-MCNC: 13.3 MG/DL (ref 9.8–20.1)
CALCIUM SERPL-MCNC: 9.1 MG/DL (ref 8.4–10.2)
CHLORIDE SERPL-SCNC: 108 MMOL/L (ref 98–107)
CO2 SERPL-SCNC: 31 MMOL/L (ref 22–29)
CREAT SERPL-MCNC: 0.65 MG/DL (ref 0.55–1.02)
CREAT/UREA NIT SERPL: 20
EOSINOPHIL # BLD AUTO: 0.16 X10(3)/MCL (ref 0–0.9)
EOSINOPHIL NFR BLD AUTO: 2.2 %
ERYTHROCYTE [DISTWIDTH] IN BLOOD BY AUTOMATED COUNT: 14.3 % (ref 11.5–17)
GFR SERPLBLD CREATININE-BSD FMLA CKD-EPI: >60 ML/MIN/1.73/M2
GLUCOSE SERPL-MCNC: 91 MG/DL (ref 74–100)
HCT VFR BLD AUTO: 39.8 % (ref 37–47)
HGB BLD-MCNC: 13.2 G/DL (ref 12–16)
IMM GRANULOCYTES # BLD AUTO: 0.02 X10(3)/MCL (ref 0–0.04)
IMM GRANULOCYTES NFR BLD AUTO: 0.3 %
INR PPP: 1
LYMPHOCYTES # BLD AUTO: 2.44 X10(3)/MCL (ref 0.6–4.6)
LYMPHOCYTES NFR BLD AUTO: 33.3 %
MCH RBC QN AUTO: 32 PG (ref 27–31)
MCHC RBC AUTO-ENTMCNC: 33.2 G/DL (ref 33–36)
MCV RBC AUTO: 96.4 FL (ref 80–94)
MONOCYTES # BLD AUTO: 0.54 X10(3)/MCL (ref 0.1–1.3)
MONOCYTES NFR BLD AUTO: 7.4 %
NEUTROPHILS # BLD AUTO: 4.13 X10(3)/MCL (ref 2.1–9.2)
NEUTROPHILS NFR BLD AUTO: 56.3 %
NRBC BLD AUTO-RTO: 0 %
PLATELET # BLD AUTO: 253 X10(3)/MCL (ref 130–400)
PMV BLD AUTO: 10.5 FL (ref 7.4–10.4)
POTASSIUM SERPL-SCNC: 4.2 MMOL/L (ref 3.5–5.1)
PROTHROMBIN TIME: 13.3 SECONDS (ref 11.4–14)
RBC # BLD AUTO: 4.13 X10(6)/MCL (ref 4.2–5.4)
SODIUM SERPL-SCNC: 141 MMOL/L (ref 136–145)
WBC # BLD AUTO: 7.33 X10(3)/MCL (ref 4.5–11.5)

## 2025-04-01 PROCEDURE — 85025 COMPLETE CBC W/AUTO DIFF WBC: CPT

## 2025-04-01 PROCEDURE — 36415 COLL VENOUS BLD VENIPUNCTURE: CPT

## 2025-04-01 PROCEDURE — 85730 THROMBOPLASTIN TIME PARTIAL: CPT

## 2025-04-01 PROCEDURE — 85610 PROTHROMBIN TIME: CPT

## 2025-04-01 PROCEDURE — 80048 BASIC METABOLIC PNL TOTAL CA: CPT

## 2025-04-01 PROCEDURE — 99213 OFFICE O/P EST LOW 20 MIN: CPT | Mod: PBBFAC

## 2025-04-01 RX ORDER — POLYETHYLENE GLYCOL 3350 17 G/17G
POWDER, FOR SOLUTION ORAL
COMMUNITY

## 2025-04-23 ENCOUNTER — HOSPITAL ENCOUNTER (OUTPATIENT)
Facility: HOSPITAL | Age: 60
Discharge: HOME | End: 2025-04-23
Attending: INTERNAL MEDICINE | Admitting: INTERNAL MEDICINE
Payer: MEDICAID

## 2025-04-23 DIAGNOSIS — R07.9 CHEST PAIN: ICD-10-CM

## 2025-04-23 DIAGNOSIS — R94.39 ABNORMAL NUCLEAR STRESS TEST: ICD-10-CM

## 2025-04-23 PROCEDURE — C1887 CATHETER, GUIDING: HCPCS | Performed by: INTERNAL MEDICINE

## 2025-04-23 PROCEDURE — C1894 INTRO/SHEATH, NON-LASER: HCPCS | Performed by: INTERNAL MEDICINE

## 2025-04-23 PROCEDURE — 25500020 PHARM REV CODE 255: Performed by: INTERNAL MEDICINE

## 2025-04-23 PROCEDURE — C1769 GUIDE WIRE: HCPCS | Performed by: INTERNAL MEDICINE

## 2025-04-23 PROCEDURE — 63600175 PHARM REV CODE 636 W HCPCS: Performed by: INTERNAL MEDICINE

## 2025-04-23 PROCEDURE — 99152 MOD SED SAME PHYS/QHP 5/>YRS: CPT | Performed by: INTERNAL MEDICINE

## 2025-04-23 PROCEDURE — 25000003 PHARM REV CODE 250: Performed by: INTERNAL MEDICINE

## 2025-04-23 PROCEDURE — 93458 L HRT ARTERY/VENTRICLE ANGIO: CPT | Performed by: INTERNAL MEDICINE

## 2025-04-23 RX ORDER — SODIUM CHLORIDE 9 MG/ML
INJECTION, SOLUTION INTRAVENOUS CONTINUOUS
Status: DISCONTINUED | OUTPATIENT
Start: 2025-04-23 | End: 2025-04-23 | Stop reason: HOSPADM

## 2025-04-23 RX ORDER — SODIUM CHLORIDE 9 MG/ML
INJECTION, SOLUTION INTRAVENOUS ONCE
Status: DISCONTINUED | OUTPATIENT
Start: 2025-04-23 | End: 2025-04-23 | Stop reason: HOSPADM

## 2025-04-23 RX ORDER — LIDOCAINE HYDROCHLORIDE 10 MG/ML
INJECTION, SOLUTION INFILTRATION; PERINEURAL
Status: DISCONTINUED | OUTPATIENT
Start: 2025-04-23 | End: 2025-04-23 | Stop reason: HOSPADM

## 2025-04-23 RX ORDER — NITROGLYCERIN 20 MG/100ML
INJECTION INTRAVENOUS
Status: DISCONTINUED | OUTPATIENT
Start: 2025-04-23 | End: 2025-04-23 | Stop reason: HOSPADM

## 2025-04-23 RX ORDER — HYDRALAZINE HYDROCHLORIDE 20 MG/ML
10 INJECTION INTRAMUSCULAR; INTRAVENOUS EVERY 4 HOURS PRN
Status: DISCONTINUED | OUTPATIENT
Start: 2025-04-23 | End: 2025-04-23 | Stop reason: HOSPADM

## 2025-04-23 RX ORDER — MORPHINE SULFATE 2 MG/ML
2 INJECTION, SOLUTION INTRAMUSCULAR; INTRAVENOUS EVERY 4 HOURS PRN
Refills: 0 | Status: DISCONTINUED | OUTPATIENT
Start: 2025-04-23 | End: 2025-04-23 | Stop reason: HOSPADM

## 2025-04-23 RX ORDER — HYDROCODONE BITARTRATE AND ACETAMINOPHEN 5; 325 MG/1; MG/1
1 TABLET ORAL EVERY 4 HOURS PRN
Refills: 0 | Status: DISCONTINUED | OUTPATIENT
Start: 2025-04-23 | End: 2025-04-23 | Stop reason: HOSPADM

## 2025-04-23 RX ORDER — ONDANSETRON HYDROCHLORIDE 2 MG/ML
4 INJECTION, SOLUTION INTRAVENOUS EVERY 8 HOURS PRN
Status: DISCONTINUED | OUTPATIENT
Start: 2025-04-23 | End: 2025-04-23 | Stop reason: HOSPADM

## 2025-04-23 RX ORDER — HEPARIN SODIUM 5000 [USP'U]/ML
INJECTION, SOLUTION INTRAVENOUS; SUBCUTANEOUS
Status: DISCONTINUED | OUTPATIENT
Start: 2025-04-23 | End: 2025-04-23 | Stop reason: HOSPADM

## 2025-04-23 RX ORDER — DIPHENHYDRAMINE HCL 25 MG
25 CAPSULE ORAL ONCE
Status: COMPLETED | OUTPATIENT
Start: 2025-04-23 | End: 2025-04-23

## 2025-04-23 RX ORDER — ACETAMINOPHEN 325 MG/1
650 TABLET ORAL EVERY 4 HOURS PRN
Status: DISCONTINUED | OUTPATIENT
Start: 2025-04-23 | End: 2025-04-23 | Stop reason: HOSPADM

## 2025-04-23 RX ORDER — DIAZEPAM 5 MG/1
5 TABLET ORAL ONCE
Status: COMPLETED | OUTPATIENT
Start: 2025-04-23 | End: 2025-04-23

## 2025-04-23 RX ORDER — FENTANYL CITRATE 50 UG/ML
INJECTION, SOLUTION INTRAMUSCULAR; INTRAVENOUS
Status: DISCONTINUED | OUTPATIENT
Start: 2025-04-23 | End: 2025-04-23 | Stop reason: HOSPADM

## 2025-04-23 RX ADMIN — BACITRACIN ZINC, NEOMYCIN, POLYMYXIN B 1 EACH: 400; 3.5; 5 OINTMENT TOPICAL at 10:04

## 2025-04-23 RX ADMIN — DIAZEPAM 5 MG: 5 TABLET ORAL at 06:04

## 2025-04-23 RX ADMIN — DIPHENHYDRAMINE HYDROCHLORIDE 25 MG: 25 CAPSULE ORAL at 06:04

## 2025-04-23 NOTE — Clinical Note
The wire was inserted into the aorta. [FreeTextEntry1] : 42yo G0 LMP 2 years ago here  annual exam \par \par h/o monthly then became irregular ~ 7 years  ~ Q 1-3 months\par then ~ 2 years ago stopped\par no h/o hot flashes, no  pena in libido ( increased if anything)\par palpitation ~  2016/2017 around time of thyroid storm [Menarche Age: ____] : age at menarche was [unfilled]

## 2025-04-23 NOTE — DISCHARGE SUMMARY
[facility] - Cath Lab  Discharge Note  Short Stay    Procedure(s) (LRB):  Angiogram, Coronary, with Left Heart Cath (N/A)      OUTCOME: Patient tolerated treatment/procedure well without complication and is now ready for discharge.    DISPOSITION: Home or Self Care    FINAL DIAGNOSIS:  Normal coronaries     FOLLOWUP: In clinic    DISCHARGE INSTRUCTIONS:  No discharge procedures on file.     TIME SPENT ON DISCHARGE: 5 minutes

## 2025-04-23 NOTE — H&P
04/23/2025 7:49 AM    Subjective:     CHIEF COMPLAINT: No chief complaint on file.                          HPI:    Ms. Cheryl Bauer is a 60 y.o. female.  The patient is scheduled for [facility]   The patient has PMH of:  myotonic dystrophy, multinodular goiter, left bundle branch block, hypertension, hyperlipidemia, COPD, (wears home oxygen 2 LNC qhs), colon polyps and former tobacco use who presents to cardiology clinic for ongoing care and results of testing. Patient is followed in cardiology clinic due to her history of myotonic dystrophy.  She follows a neurologist/myotonic dystrophy physician in [place], [place], Dr. Haines, who recommends a EKG and Echocardiogram every 6 months along with an annual Nuclear Stress Test.  Nuclear stress test obtained on 3.12.25 revealed a moderate-to-severe intensity, medium-sized, mostly reversible perfusion abnormality with some fixed areas in the basal to mid lateral wall in the typical distribution of the left circumflex territory.  To note, the patient has had multiple nuclear stress past dating back to 2021 that were normal.  Most recent Echocardiogram completed on 12.20.24 demonstrated estimated EF of 67%, no significant valvular abnormalities and a small pericardial effusion with no evidence of cardiac tamponade. (See full reports below)     The patient had intermittent episodes of chest pain that she states occurs at random at least 1 to 2 times a week.  She describes it as a left-sided chest tightness that occurs underneath her breasts at times without any associating or aggravating symptoms.  She states the chest pain may last 5 minutes before self terminating.  She denies the use of sublingual nitroglycerin.  She also reports a slight progression in her exertional dyspnea when ambulating around her home utilizing a walker.  However, the patient does continue to use a wheelchair for longer distances.  To note, the patient's activity is significantly limited at baseline due  to her muscular dystrophy.  However she states that she is able to perform her basic ADLs without angina or angina equivalent symptoms.  She denies any other cardiovascular complaints of palpitations, orthopnea, PND, peripheral edema, claudication, near-syncope or syncope.  She continues to endorse intermittent mechanical falls that occurred because her legs reportedly gave out randomly, due to her muscular dystrophy.     Past Medical History  Problem List[1]    Surgical History    Past Surgical History:   Procedure Laterality Date    CARPAL TUNNEL RELEASE Left     COLONOSCOPY, WITH POLYPECTOMY USING SNARE N/A 1/27/2025    Procedure: COLONOSCOPY, WITH POLYPECTOMY USING SNARE;  Surgeon: Lacy Miles MD;  Location: [facility] ENDOSCOPY;  Service: Gastroenterology;  Laterality: N/A;  Due November 2024    EGD, WITH CLOSED BIOPSY N/A 11/1/2023    Procedure: EGD, WITH CLOSED BIOPSY;  Surgeon: Lacy Miles MD;  Location: [facility] ENDOSCOPY;  Service: Gastroenterology;  Laterality: N/A;    HYSTERECTOMY      KNEE SURGERY Left     THYROIDECTOMY Right     TONSILLECTOMY AND ADENOIDECTOMY      TUBAL LIGATION         Social History[2]    Family History   Problem Relation Name Age of Onset    Hypertension Father      Heart failure Father      Heart disease Father      Heart attack Father  68    Diabetes Father       Review of patient's allergies indicates:   Allergen Reactions    Phenytoin sodium extended Hives and Other (See Comments)       Current Medications    Current Outpatient Medications   Medication Instructions    albuterol (PROVENTIL/VENTOLIN HFA) 90 mcg/actuation inhaler 1 puff, Every 4 hours PRN    ANORO ELLIPTA 62.5-25 mcg/actuation DsDv 1 puff, Daily    cholecalciferol (vitamin D3) 50,000 Units, Oral, Every 7 days    lisinopriL (PRINIVIL,ZESTRIL) 5 mg, Oral, Daily    metoprolol succinate (TOPROL-XL) 25 mg, Oral, Daily    mexiletine (MEXITIL) 200 mg, Every 8 hours    nitroGLYCERIN (NITROSTAT) 0.4 mg,  "Sublingual, Every 5 min PRN    omega-3 fatty acids/fish oil (FISH OIL-OMEGA-3 FATTY ACIDS) 300-1,000 mg capsule 3 capsules, 2 times daily    omeprazole (PRILOSEC) 40 mg, Oral, Every morning    paroxetine (PAXIL) 10 mg, Daily    polyethylene glycol (GLYCOLAX) 17 gram PwPk Take by mouth.    traMADoL (ULTRAM) 50 mg, Every 6 hours PRN         ROS:   refer to HPI     Objective:     BP Readings from Last 3 Encounters:   04/23/25 100/63   04/09/25 (!) 90/56   04/01/25 (!) 104/59        Pulse Readings from Last 3 Encounters:   04/23/25 (!) 56   04/09/25 (!) 54   04/01/25 83        Temp Readings from Last 3 Encounters:   04/23/25 98.4 °F (36.9 °C) (Oral)   04/09/25 98.4 °F (36.9 °C) (Oral)   04/01/25 98.4 °F (36.9 °C) (Oral)       Wt Readings from Last 3 Encounters:   04/23/25 66.7 kg (147 lb)   04/09/25 65.9 kg (145 lb 3.2 oz)   04/01/25 66 kg (145 lb 6.4 oz)         PE:  Blood pressure 100/63, pulse (!) 56, temperature 98.4 °F (36.9 °C), temperature source Oral, resp. rate 20, height 5' 7" (1.702 m), weight 66.7 kg (147 lb), SpO2 99%, not currently breastfeeding.   GENERAL:  NAD   CONSTITUTIONAL:  No acute distress.  Not ill appearing.  NECK:  Supple  CARDIOVASCULAR:  Normal rate.  Regular rhythm.   PULMONARY:  No respiratory distress.  No audible wheezing.    ABDOMINAL:  No distention.    MUSCULOSKELETAL:  No deformity.   SKIN:  No bruising.  No rash.  NEUROLOGICAL:  Oriented x 3.                                                                                                                                                                                                                                                                                                                                                                                                                                                                              CARDIAC TESTING:  Echocardiogram  Results for orders placed during the hospital encounter " of 12/20/24    Echo    Interpretation Summary    Left Ventricle: The left ventricle is normal in size. Mild septal thickening. There is normal systolic function. Quantitated ejection fraction is 67%.    Right Ventricle: Normal right ventricular cavity size. Systolic function is normal.    Pulmonary Artery: The estimated pulmonary artery systolic pressure is 31 mmHg.    IVC/SVC: Intermediate venous pressure at 8 mmHg.    Pericardium: There is a small effusion. No indication of cardiac tamponade.      Stress Test  Results for orders placed during the hospital encounter of 03/12/25    Nuclear Stress - Cardiology Interpreted    Interpretation Summary    Abnormal myocardial perfusion scan.    There is a moderate to severe intensity, medium sized, mostly reversible perfusion abnormality with some fixed areas in the basal to mid lateral wall(s) in the typical distribution of the LCX territory.    There are no other significant perfusion abnormalities.    The gated perfusion images showed an ejection fraction of 89% at rest. The gated perfusion images showed an ejection fraction of 86% post stress.    The patient reported no chest pain during the stress test.    There were no arrhythmias during stress.    The nuclear stress test is  fair quality.  On the nuclear stress test the EF is normal.  If the patient has an echo, the EF on the echo is considered more accurate.    The patient has a moderate risk nuclear stress test due to lateral reversible defect.    If clinically indicated, consider further evaluation with coronary angiogram.     Coronary Angiogram  Results for orders placed during the hospital encounter of 04/09/25    Cardiac catheterization    Narrative  Aborted invasive cardiology procedure- see Procedure Log link for details.     Holter Monitor  No cardiac monitor results found for the past 12 months    Last BMP BMP  Lab Results   Component Value Date     04/01/2025    K 4.2 04/01/2025     (H)  "04/01/2025    CO2 31 (H) 04/01/2025    BUN 13.3 04/01/2025    CREATININE 0.65 04/01/2025    CALCIUM 9.1 04/01/2025    EGFRNORACEVR >60 04/01/2025      Last CBC     Lab Results   Component Value Date    WBC 7.33 04/01/2025    HGB 13.2 04/01/2025    HCT 39.8 04/01/2025    MCV 96.4 (H) 04/01/2025     04/01/2025           BNP  No results found for: "BNP"  Last lipids    Lab Results   Component Value Date    CHOL 208 (H) 12/31/2024    CHOL 316 03/14/2022    CHOL 346 02/04/2022    HDL 69 (H) 12/31/2024    HDL 87 03/14/2022    HDL 96 02/04/2022    .00 12/31/2024    .00 03/14/2022    .00 02/04/2022    TRIG 155 (H) 12/31/2024    TRIG 276 03/14/2022    TRIG 382 02/04/2022    TOTALCHOLEST 3 12/31/2024    TOTALCHOLEST 4 03/14/2022    TOTALCHOLEST 4 02/04/2022      LFT     Lab Results   Component Value Date    ALT 25 12/31/2024    ALT 24 07/13/2023    ALT 32 03/14/2022    AST 30 12/31/2024    AST 27 07/13/2023    AST 32 03/14/2022       Assessment:     Problem List[3]  10 Year Cardiovascular Risk:  The 10-year ASCVD risk score (Monica DK, et al., 2019) is: 1.6%    Values used to calculate the score:      Age: 60 years      Sex: Female      Is Non- : No      Diabetic: No      Tobacco smoker: No      Systolic Blood Pressure: 100 mmHg      Is BP treated: No      HDL Cholesterol: 69 mg/dL      Total Cholesterol: 208 mg/dL  BMI:  Body mass index is 23.02 kg/m².      Last PCP visit:  Patient does not have a PCP or has not yet seen their PCP    Plan:   LHC scheduled for today  Patient has been NPO  Consent signed     Braeden Aviles MD  Cardiology Attending     Future Appointments   Date Time Provider Department Center   6/23/2025 10:00 AM Judith, Karma B, FNP [facility]             [1]   Patient Active Problem List  Diagnosis    Shortness of breath    Left bundle branch block    HTN (hypertension), benign    Hyperlipidemia    History of colonic polyps    Reactive " gastropathy    Chronic idiopathic constipation   [2]   Social History  Socioeconomic History    Marital status:    Tobacco Use    Smoking status: Former     Current packs/day: 0.00     Types: Cigarettes     Quit date: 2009     Years since quittin.3    Smokeless tobacco: Never    Tobacco comments:     Quit 15 years ago   Substance and Sexual Activity    Alcohol use: Yes     Alcohol/week: 3.0 standard drinks of alcohol     Types: 3 Cans of beer per week     Comment: daily    Drug use: Never    Sexual activity: Yes   [3]   Patient Active Problem List  Diagnosis    Shortness of breath    Left bundle branch block    HTN (hypertension), benign    Hyperlipidemia    History of colonic polyps    Reactive gastropathy    Chronic idiopathic constipation

## 2025-04-23 NOTE — Clinical Note
24 ml of contrast were injected throughout the case. 10 mL of contrast was the total wasted during the case. 34 mL was the total amount used during the case.

## 2025-04-23 NOTE — Clinical Note
The radial band was applied to the right radial artery. 8 cc's of air were inserted into the closure device. Fingers warm to touch. Cap refill < 3 sec. Move fingers w/ out any pain or discomfort.

## 2025-04-24 VITALS
DIASTOLIC BLOOD PRESSURE: 64 MMHG | TEMPERATURE: 98 F | SYSTOLIC BLOOD PRESSURE: 99 MMHG | HEART RATE: 63 BPM | OXYGEN SATURATION: 99 % | BODY MASS INDEX: 23.07 KG/M2 | RESPIRATION RATE: 20 BRPM | WEIGHT: 147 LBS | HEIGHT: 67 IN

## 2025-06-13 ENCOUNTER — OFFICE VISIT (OUTPATIENT)
Dept: CARDIOLOGY | Facility: CLINIC | Age: 60
End: 2025-06-13
Payer: MEDICAID

## 2025-06-13 VITALS
DIASTOLIC BLOOD PRESSURE: 55 MMHG | WEIGHT: 149.19 LBS | SYSTOLIC BLOOD PRESSURE: 96 MMHG | OXYGEN SATURATION: 98 % | RESPIRATION RATE: 18 BRPM | HEART RATE: 60 BPM | BODY MASS INDEX: 23.42 KG/M2 | TEMPERATURE: 98 F | HEIGHT: 67 IN

## 2025-06-13 DIAGNOSIS — I44.7 LEFT BUNDLE BRANCH BLOCK: ICD-10-CM

## 2025-06-13 DIAGNOSIS — I10 HTN (HYPERTENSION), BENIGN: ICD-10-CM

## 2025-06-13 DIAGNOSIS — R06.02 SHORTNESS OF BREATH: ICD-10-CM

## 2025-06-13 DIAGNOSIS — E78.2 MIXED HYPERLIPIDEMIA: Primary | ICD-10-CM

## 2025-06-13 PROCEDURE — 99215 OFFICE O/P EST HI 40 MIN: CPT | Mod: PBBFAC | Performed by: NURSE PRACTITIONER

## 2025-06-13 NOTE — PROGRESS NOTES
CHIEF COMPLAINT:   Chief Complaint   Patient presents with    Post [facility]     Zafar any cardiac problems                   Review of patient's allergies indicates:   Allergen Reactions    Phenytoin sodium extended Hives and Other (See Comments)                                          HPI:  Cheryl Bauer 60 y.o. female with PMH of myotonic dystrophy, multinodular goiter, left bundle branch block, hypertension, hyperlipidemia, COPD, (wears home oxygen 2 LNC qhs), colon polyps and former tobacco use who presents to cardiology clinic for ongoing care post [facility]. Patient is followed in cardiology clinic due to her history of myotonic dystrophy.  She follows a neurologist/myotonic dystrophy physician in [place], [place], Dr. Haines, who recommends a EKG and Echocardiogram every 6 months along with an annual Nuclear Stress Test.    Nuclear stress test obtained on 3.12.25 revealed a moderate-to-severe intensity, medium-sized, mostly reversible perfusion abnormality with some fixed areas in the basal to mid lateral wall in the typical distribution of the left circumflex territory.  She underwent a subsequent Left Heart Catheterization on 4.23.25 that demonstrated normal coronary arteries  Most recent Echocardiogram completed on 12.20.24 demonstrated estimated EF of 67%, no significant valvular abnormalities and a small pericardial effusion with no evidence of cardiac tamponade. (See full reports below)    Today the patient reports that she feels well overall.  She denies any further chest pain and endorses stable shortness of breath with exertion that has not progressed or worsened.  The patient appears euvolemic on exam today and denies any other symptoms of volume overload including orthopnea, PND, peripheral edema, or abdominal distention.  She denies any other cardiovascular complaints of palpitations, claudication, near-syncope or syncope.    The patient's activity remains limited a bit baseline due to muscular  dystrophy but she denies experiencing any anginal or anginal equivalent symptoms with basic ADLs.  She utilizes a walker occasionally at home for short distances but continues to utilize a wheelchair for longer distances.    Denies smoking. Quit 12 years prior. Former smoker of 1 pack/day x 20 years.  Drinks 3 natural light beers per day  Denies drug use                                        CARDIAC TESTING:   Cardiac Catheterization 4.23.25    Summary         The pre-procedure left ventricular end diastolic pressure was 15.    The estimated blood loss was <50 mL.    The coronary arteries were normal..     FINDINGS  Access:  Right radial      Catheters used:  Tiger   LVEDP:  15 mmHg  Left Main:  No stenosis  LAD:  No stenosis  Circumflex:  No stenosis  RCA:  No stenosis     RECOMMENDATIONS  Medical management  Risk factor modifications   Activity -- avoid straining with affected limb for one week  Exercise -- as tolerated  Precautions post D/C -- come to ER for hematoma, unusual pain, erythema, or unusual drainage at access site  Precautions post D/C -- if develop bleeding or hematoma at access site, hold pressure at access site, and come to ER      Nuclear Stress Test 3.12.25    Abnormal myocardial perfusion scan.    There is a moderate to severe intensity, medium sized, mostly reversible perfusion abnormality with some fixed areas in the basal to mid lateral wall(s) in the typical distribution of the LCX territory.    There are no other significant perfusion abnormalities.    The gated perfusion images showed an ejection fraction of 89% at rest. The gated perfusion images showed an ejection fraction of 86% post stress.    The patient reported no chest pain during the stress test.    There were no arrhythmias during stress.     The nuclear stress test is  fair quality.    On the nuclear stress test the EF is normal.  If the patient has an echo, the EF on the echo is considered more accurate.         The patient  has a moderate risk nuclear stress test due to lateral reversible defect.     If clinically indicated, consider further evaluation with coronary angiogram.       ECHO 12.20.24    Left Ventricle: The left ventricle is normal in size. Mild septal thickening. There is normal systolic function. Quantitated ejection fraction is 67%.    Right Ventricle: Normal right ventricular cavity size. Systolic function is normal.    Pulmonary Artery: The estimated pulmonary artery systolic pressure is 31 mmHg.    IVC/SVC: Intermediate venous pressure at 8 mmHg.    Pericardium: There is a small effusion. No indication of cardiac tamponade.         ECHO 6.20.24    Left Ventricle: The left ventricle is normal in size. Normal wall thickness. There is normal systolic function. Biplane (2D) method of discs ejection fraction is 65%.    Right Ventricle: Systolic function is normal.    IVC/SVC: Normal venous pressure at 3 mmHg.    Pericardium: There is a trivial circumferential effusion.    Nuclear Stress Test 3.12.24    Normal myocardial perfusion scan. There is no evidence of myocardial ischemia or infarction.    The gated perfusion images showed an ejection fraction of 86% at rest. The gated perfusion images showed an ejection fraction of 87% post stress.    The patient reported no chest pain during the stress test.    There were no arrhythmias during stress.     The nuclear stress test is  good quality.    On the nuclear stress test the EF is supranormal.  If the patient has an echo, the EF on the echo is considered more accurate.         The patient has a low risk nuclear stress test      Nuclear stress test indicates no ischemia.    ECHO 12.12.23    Left Ventricle: The left ventricle is normal in size. Normal wall thickness. There is normal systolic function. Biplane (2D) method of discs ejection fraction is 65%.    Right Ventricle: Normal right ventricular cavity size. Systolic function is mildly reduced.    IVC/SVC: Normal venous  pressure at 3 mmHg    Nuclear Stress Test - Normal myocardial perfusion scan.  There is no evidence of myocardial ischemia or infarction (3.14.23)  Nuclear Stress Test - Normal myocardial perfusion study with no evidence of ischemia or scar (3.28.22)  Stress Echocardiogram in May 2019 which was negative for inducible ischemia.                 ECHO EF- 60%, normal left ventricular diastolic function and no significant valvular abnormalities (6.14.23)  ECHO -EF- 59%, PASP is estimated to be normal, no significant valvular abnormalities, in comparison to previous echocardiogram performed on 10/26/2021 there were no significant changes.   (4.14.22)  Echocardiogram 10/2021 EF 50-55%, mildly elevated PASP  Echocardiogram from 3/10/2021 with EF 50-55%    30 day event monitor - Sinus rhythm with average HR 84 bpm, minimum 60 bpm, and max 137 bpm.  There were no significant pauses, VT, SVT or AFib.  4. Reported episodes the patient was noted to be in sinus. (April 2022)  30-day event monitor from December 2020 with no significant arrhythmias.  IDALIA testing in November 2019 which revealed no evidence of significant arterial insufficiency.                                                                                                                                                                                                                                                            Patient Active Problem List   Diagnosis    Shortness of breath    Left bundle branch block    HTN (hypertension), benign    Hyperlipidemia    History of colonic polyps    Reactive gastropathy    Chronic idiopathic constipation     Past Surgical History:   Procedure Laterality Date    ANGIOGRAM, CORONARY, WITH LEFT HEART CATHETERIZATION N/A 4/23/2025    Procedure: Angiogram, Coronary, with Left Heart Cath;  Surgeon: Braeden Aviles MD;  Location: [facility] CATH LAB;  Service: Cardiology;  Laterality: N/A;    CARPAL TUNNEL RELEASE Left      COLONOSCOPY, WITH POLYPECTOMY USING SNARE N/A 2025    Procedure: COLONOSCOPY, WITH POLYPECTOMY USING SNARE;  Surgeon: Lacy Miles MD;  Location: [facility] ENDOSCOPY;  Service: Gastroenterology;  Laterality: N/A;  Due 2024    EGD, WITH CLOSED BIOPSY N/A 2023    Procedure: EGD, WITH CLOSED BIOPSY;  Surgeon: Lacy Miles MD;  Location: [facility] ENDOSCOPY;  Service: Gastroenterology;  Laterality: N/A;    HYSTERECTOMY      KNEE SURGERY Left     THYROIDECTOMY Right     TONSILLECTOMY AND ADENOIDECTOMY      TUBAL LIGATION       Social History     Socioeconomic History    Marital status:    Tobacco Use    Smoking status: Former     Current packs/day: 0.00     Types: Cigarettes     Quit date: 2009     Years since quittin.4    Smokeless tobacco: Never    Tobacco comments:     Quit 15 years ago   Substance and Sexual Activity    Alcohol use: Yes     Alcohol/week: 3.0 standard drinks of alcohol     Types: 3 Cans of beer per week     Comment: daily    Drug use: Never    Sexual activity: Yes        Family History   Problem Relation Name Age of Onset    Hypertension Father      Heart failure Father      Heart disease Father      Heart attack Father  68    Diabetes Father           Current Outpatient Medications:     albuterol (PROVENTIL/VENTOLIN HFA) 90 mcg/actuation inhaler, Inhale 1 puff into the lungs every 4 (four) hours as needed., Disp: , Rfl:     ANORO ELLIPTA 62.5-25 mcg/actuation DsDv, Inhale 1 puff into the lungs once daily., Disp: , Rfl:     cholecalciferol, vitamin D3, 1,250 mcg (50,000 unit) capsule, Take 1 capsule (50,000 Units total) by mouth every 7 days., Disp: 12 capsule, Rfl: 1    lisinopriL (PRINIVIL,ZESTRIL) 5 MG tablet, Take 1 tablet (5 mg total) by mouth once daily., Disp: 90 tablet, Rfl: 2    metoprolol succinate (TOPROL-XL) 25 MG 24 hr tablet, Take 1 tablet (25 mg total) by mouth once daily., Disp: 30 tablet, Rfl: 11    mexiletine (MEXITIL) 200 MG Cap, Take 200 mg  "by mouth every 8 (eight) hours., Disp: , Rfl:     nitroGLYCERIN (NITROSTAT) 0.4 MG SL tablet, Place 1 tablet (0.4 mg total) under the tongue every 5 (five) minutes as needed for Chest pain., Disp: 25 tablet, Rfl: 3    omega-3 fatty acids/fish oil (FISH OIL-OMEGA-3 FATTY ACIDS) 300-1,000 mg capsule, Take 3 capsules by mouth 2 (two) times a day., Disp: , Rfl:     paroxetine (PAXIL) 10 MG tablet, Take 10 mg by mouth once daily., Disp: , Rfl:     polyethylene glycol (GLYCOLAX) 17 gram PwPk, Take by mouth., Disp: , Rfl:     traMADoL (ULTRAM) 50 mg tablet, Take 50 mg by mouth every 6 (six) hours as needed., Disp: , Rfl:     omeprazole (PRILOSEC) 40 MG capsule, Take 1 capsule (40 mg total) by mouth every morning. (Patient not taking: Reported on 6/13/2025), Disp: 90 capsule, Rfl: 3    Current Facility-Administered Medications:     diazePAM tablet 5 mg, 5 mg, Oral, On Call Procedure, Jan, Laterica L, FNP    Facility-Administered Medications Ordered in Other Visits:     sodium chloride 0.9% flush 10 mL, 10 mL, Intravenous, PRN, Braeden Aviles MD     ROS:                                                                                                                                                                             Review of Systems   Constitutional: Negative.    HENT: Negative.     Eyes: Negative.    Respiratory:  Positive for shortness of breath.    Cardiovascular:  Negative for chest pain, palpitations, orthopnea and leg swelling.   Gastrointestinal: Negative.    Genitourinary: Negative.    Musculoskeletal: Negative.    Skin: Negative.    Neurological: Negative.    Endo/Heme/Allergies: Negative.    Psychiatric/Behavioral: Negative.          Blood pressure (!) 96/55, pulse 60, temperature 97.9 °F (36.6 °C), temperature source Oral, resp. rate 18, height 5' 7" (1.702 m), weight 67.7 kg (149 lb 3.2 oz), SpO2 98%.   PE:  Physical Exam  HENT:      Head: Normocephalic.      Nose: Nose normal.   Eyes:      " Pupils: Pupils are equal, round, and reactive to light.   Cardiovascular:      Rate and Rhythm: Normal rate and regular rhythm.   Pulmonary:      Effort: Pulmonary effort is normal.   Abdominal:      General: Abdomen is flat. Bowel sounds are normal.      Palpations: Abdomen is soft.   Musculoskeletal:         General: Normal range of motion.      Cervical back: Normal range of motion.   Skin:     General: Skin is warm.   Neurological:      General: No focal deficit present.      Mental Status: She is alert.   Psychiatric:         Mood and Affect: Mood normal.                ASSESSMENT/PLAN:  Left bundle branch block in the setting of muscular dystrophy  - S/P [facility] - normal coronary arteries (4.23.25)  - Moderate-to-severe intensity, medium-sized, mostly reversible perfusion abnormality with some fixed areas in the basal to mid lateral wall in the typical distribution of the left circumflex territory (3.12.25)  - Normal myocardial perfusion scan. There is no evidence of myocardial ischemia or infarction. (3.12.24)  - Normal myocardial perfusion study with no evidence of ischemia or scar (3.14.23)  - Normal myocardial perfusion study with no evidence of ischemia or scar (3.28.22)  - Lexiscan stress test- negative for ischemia ( 5.6.21)  - Denies CP. Reports stable DOVE  - Continue metoprolol succinate 25 mg and SL Nitro PRN CP  - Recommended EKG/Echo every 6 months and stress test annually per patient's neurologist Dr. Haines. Fax Number: [phone]      DOVE- stable  - EF-67%, no significant valvular abnormalities, small pericardial effusion with no indication of cardiac tamponade per ECHO 12.20.24  - EF-65%, no significant valvular abnormalities per ECHO 6.20.24  - EF- 65%, no significant valvular abnormalities per ECHO 12.12.23  - EF - 60% per ECHO 6.14.23  - Last Echocardiogram obtained in December 2024.  Patient due for surveillance ECHO.  Will order today       COPD (wear oxygen 2 L nasal cannula at night)  -  Continue management per P pulmonology    Hypertension  - BP at goal 96/55  - Continue lisinopril 5 mg daily  - Counseled on low salt diet and activity as tolerated      Hyperlipidemia  - Statin and zetia contraindicated due to muscular dystrophy  - Continue omega-3  - Management per PCP    Muscular dystrophy  - Management per neurologist in Dr. Yancy Mercedes.      ECHO   Follow up in cardiology clinic in 6 months or sooner if needed   Follow up with PCP and neurologist as directed  Strict ED precautions                    Patient answered NO to all of the above 3 questions...

## 2025-06-13 NOTE — PATIENT INSTRUCTIONS
ECHO   Follow up in cardiology clinic in 6 months or sooner if needed   Follow up with PCP and neurologist as directed  Strict ED precautions

## 2025-06-23 ENCOUNTER — OFFICE VISIT (OUTPATIENT)
Dept: GASTROENTEROLOGY | Facility: CLINIC | Age: 60
End: 2025-06-23
Payer: MEDICAID

## 2025-06-23 VITALS
TEMPERATURE: 98 F | BODY MASS INDEX: 23.8 KG/M2 | OXYGEN SATURATION: 97 % | WEIGHT: 151.63 LBS | HEART RATE: 54 BPM | SYSTOLIC BLOOD PRESSURE: 95 MMHG | HEIGHT: 67 IN | DIASTOLIC BLOOD PRESSURE: 70 MMHG | RESPIRATION RATE: 16 BRPM

## 2025-06-23 DIAGNOSIS — K76.0 HEPATIC STEATOSIS: ICD-10-CM

## 2025-06-23 DIAGNOSIS — K31.89 REACTIVE GASTROPATHY: Primary | ICD-10-CM

## 2025-06-23 DIAGNOSIS — K59.04 CHRONIC IDIOPATHIC CONSTIPATION: ICD-10-CM

## 2025-06-23 DIAGNOSIS — Z86.0100 HISTORY OF COLONIC POLYPS: ICD-10-CM

## 2025-06-23 PROCEDURE — 99215 OFFICE O/P EST HI 40 MIN: CPT | Mod: PBBFAC | Performed by: NURSE PRACTITIONER

## 2025-06-23 PROCEDURE — 4010F ACE/ARB THERAPY RXD/TAKEN: CPT | Mod: CPTII,,, | Performed by: NURSE PRACTITIONER

## 2025-06-23 PROCEDURE — 3008F BODY MASS INDEX DOCD: CPT | Mod: CPTII,,, | Performed by: NURSE PRACTITIONER

## 2025-06-23 PROCEDURE — 3078F DIAST BP <80 MM HG: CPT | Mod: CPTII,,, | Performed by: NURSE PRACTITIONER

## 2025-06-23 PROCEDURE — 1159F MED LIST DOCD IN RCRD: CPT | Mod: CPTII,,, | Performed by: NURSE PRACTITIONER

## 2025-06-23 PROCEDURE — 1160F RVW MEDS BY RX/DR IN RCRD: CPT | Mod: CPTII,,, | Performed by: NURSE PRACTITIONER

## 2025-06-23 PROCEDURE — 99214 OFFICE O/P EST MOD 30 MIN: CPT | Mod: S$PBB,,, | Performed by: NURSE PRACTITIONER

## 2025-06-23 PROCEDURE — 3074F SYST BP LT 130 MM HG: CPT | Mod: CPTII,,, | Performed by: NURSE PRACTITIONER

## 2025-06-23 RX ORDER — OMEPRAZOLE 40 MG/1
40 CAPSULE, DELAYED RELEASE ORAL EVERY MORNING
Qty: 90 CAPSULE | Refills: 3 | Status: SHIPPED | OUTPATIENT
Start: 2025-06-23

## 2025-06-23 NOTE — PROGRESS NOTES
Subjective:       Patient ID: Cheryl Bauer is a 60 y.o. female.    Chief Complaint: Heartburn (Pt s/o heartburn daily. Pt reports that her insurance denied the PPI med and she was trying to go without it but symptoms are worsening. )    This 60-year-old  female with hypertension, hypertriglyceridemia, multinodular goiter s/p right thyroidectomy and isthmus, myotonic muscular dystrophy, left bundle branch block, and colon polyps presents accompanied by her significant other for a follow-up visit.  She was initially seen March 18, 2021 and referred for alternating bowel habits at that time.  She reported intermittent epigastric abdominal pain for the past 1-1/2 months described as aching, cramping, and nonradiating.  The pain was worsened with eating and she was unable to identify specific food triggers.  She denied relieving factors.  She had occasional acid reflux and pyrosis and denied taking anything for symptomatic relief.  Her appetite was good and her weight was stable.  She denied nocturnal symptoms, fever, chills, nausea, vomiting, odynophagia, dysphagia, or early satiety.  She reported fluctuating bowel habits between loose to watery stools 5-6 times per day for 2-3 consecutive days followed by constipation with frequent straining and occasional bright red rectal bleeding with bowel movements noted on the tissue after wiping for 3 to 5 days before the diarrhea started again.  She denied melena, fecal urgency, fecal incontinence, tenesmus, or pain with defecation.  She denied taking anything for treatment of constipation or diarrhea.     Laboratory results March 9, 2021 revealed fecal calprotectin 29, pancreatic elastase 371, and negative stool culture, ova and parasite, Giardia, fecal leukocyte, rotavirus, and Cryptosporidium.       Abdominal ultrasound April 7, 2021 revealed diffuse hepatic steatosis and normal gallbladder.       She underwent EGD and colonoscopy November 17, 2021.  EGD revealed  ectopic gastric mucosa at the cricopharyngeus, esophagogastric landmarks identified, erythematous mucosa in the pre-pyloric region of the stomach, and normal examined duodenum.  Pathology revealed mild chronic gastritis with no dysplasia identified and no Helicobacter pylori organisms identified.  Colonoscopy revealed hemorrhoids found on perianal exam, examined portion of ileum normal, multiple fragments of adenomatous polyps x5 in the ascending colon with polypectomy and no evidence of malignancy, multiple fragments of adenomatous polyps x2 in the transverse colon with polypectomy and no evidence of malignancy, multiple fragments of adenomatous polyps x2 in the descending colon with polypectomy and no evidence of malignancy, and adenomatous polyps x3 in the sigmoid colon with polypectomy and no evidence of malignancy.  She was recommended repeat colonoscopy in 3 years.     She was seen for a follow-up visit July 13, 2023 and reported minimal change in symptoms from initial office visit in March 2021.  She felt as though epigastric abdominal pain had worsened over the past few months and was described aching, cramping, and radiating down above her umbilical region.  She was unable to identify specific triggers or relieving factors.  She denied appetite changes, unintentional weight loss, fever, chills, nausea, vomiting, hematemesis, odynophagia, dysphagia, acid reflux, or pyrosis.  She had occasional early satiety.  Bowel habits continued to fluctuate between loose stools and formed to soft stools 3-5 times daily.  She denied taking anything for symptomatic relief as she would get constipated.  She denied melena, hematochezia, fecal urgency, fecal incontinence, or pain with defecation.     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  Pathology revealed  reactive gastritis/gastropathy, negative for H pylori organisms, negative for dysplasia.     She was seen for a follow-up visit June 20, 2024.  She continued to take omeprazole 40 mg daily.  She reported taking MiraLax 17 g daily with worsening constipation noted over the past 6 months.  She reported going up to 15 days without any bowel movement over the past few weeks and that her sister had to go to her home to administer an enema.  She did have some relief with enema administration.  She had difficulty passing stool with minimal urge to go to the restroom over the past 6 months.  She denied melena, hematochezia, fecal urgency, fecal incontinence, or pain with defecation.  Her appetite was fair and her weight had fluctuated.  She denied fever, chills, nausea, vomiting, hematemesis, odynophagia, dysphagia, acid reflux, pyrosis, early satiety, or abdominal pain.  She was advised to start Linzess 145 mcg daily with an okay for enema use 1-2 times per week as needed for rectal stimulation.     She was seen for a follow-up visit December 20, 2024.  She reported taking Linzess 145 mcg daily after last office visit June 20, 2024 with good relief of bowel movements and regular bowel movements for approximately 2-3 weeks.  She then began having more urgency and diarrhea with frequent loose stools and began taking the Linzess approximately 1-2 times weekly.  She stopped taking the Linzess altogether after having minimal relief and began having regular bowel movements on her own without having to take anything.  She then became constipated again and reported that her last full bowel movement was December 3, 2024.  She had associated gas, borborygmi, and generalized abdominal cramping.  She would have an urge to have a bowel movement but did not pass any stool.  She denied melena, hematochezia, fecal incontinence, or pain with defecation.  Her appetite was decreased and her weight had decreased over time.  She was 143 lb and  was 155 lb 2024.  She denied fever, chills, nausea, vomiting, hematemesis, odynophagia, dysphagia, acid reflux, pyrosis, or early satiety.  She was recommended to start Amitiza 24 mcg twice daily.    LFTs normal 2024.    She underwent colonoscopy 2025 with findings of the examined portion of the ileum was normal. One 3 mm polyp in the ascending colon, removed with a cold snare. Resected and retrieved. Two 1 to 2 mm polyps in the transverse colon, removed with a cold snare. Resected and retrieved. Internal hemorrhoids. No specimens collected. She was recommended repeat colonoscopy in 5 years. Pathology revealed the followin. Ascending colon polyp, Polypectomy:  - Tubular adenoma.  - No evidence of malignancy.     2. Transverse colon polyps x 2, Polypectomy:  - Tubular adenoma x 2.  - No evidence of malignancy.    Today, she presents for a follow-up visit.  Her weight is documented at 151 lb.  She is currently not taking anything for acid reflux as omeprazole 40 mg was not covered by her insurance since the beginning of the year.  She has frequent symptoms of acid regurgitation with burning at the back of her throat and reflux symptoms throughout the day and which awaken her almost nightly.  Her appetite is good and her weight has improved.  She denies fever, chills, nausea, vomiting, hematemesis, odynophagia, dysphagia, belching, bloating, early satiety, or abdominal pain.  She has formed to soft stools once daily with taking MiraLax every other day.  She feels completely evacuated with defecation.  She denies melena, hematochezia, fecal urgency, fecal incontinence, or pain with defecation.     She underwent colonoscopy 2017 with findings of tubulovillous adenomatous polyp at the cecum with no evidence of malignancy, tubular adenomatous polyp at 30 cm with no evidence of malignancy, tubular adenomatous polyp at 43 cm with no evidence of malignancy.  Three cecal  polyps were noted with the largest measuring 1.5 cm.  Biopsies were obtained of the 2 smaller polyps, and the 1.5 cm polyp was removed in its entirety by snare.  She was recommended repeat colonoscopy in 3 years.  She denies regular NSAID use or use of blood thinners.  She denies tobacco use and drinks 5-6 beers per day.  She is a former smoker.  She denies illicit drug use.  She denies a family history of IBD, colon polyps, or colon cancer.      Review of patient's allergies indicates:   Allergen Reactions    Phenytoin sodium extended Hives and Other (See Comments)     Past Medical History:   Diagnosis Date    COPD (chronic obstructive pulmonary disease)     Hypertension     Muscular dystrophy      Past Surgical History:   Procedure Laterality Date    ANGIOGRAM, CORONARY, WITH LEFT HEART CATHETERIZATION N/A 4/23/2025    Procedure: Angiogram, Coronary, with Left Heart Cath;  Surgeon: Braeden Aviles MD;  Location: [facility] CATH LAB;  Service: Cardiology;  Laterality: N/A;    CARPAL TUNNEL RELEASE Left     COLONOSCOPY, WITH POLYPECTOMY USING SNARE N/A 1/27/2025    Procedure: COLONOSCOPY, WITH POLYPECTOMY USING SNARE;  Surgeon: Lacy Miles MD;  Location: [facility] ENDOSCOPY;  Service: Gastroenterology;  Laterality: N/A;  Due November 2024    EGD, WITH CLOSED BIOPSY N/A 11/1/2023    Procedure: EGD, WITH CLOSED BIOPSY;  Surgeon: Lacy Miles MD;  Location: [facility] ENDOSCOPY;  Service: Gastroenterology;  Laterality: N/A;    HYSTERECTOMY      KNEE SURGERY Left     THYROIDECTOMY Right     TONSILLECTOMY AND ADENOIDECTOMY      TUBAL LIGATION       Family History:   family history includes Diabetes in her father; Heart attack (age of onset: 68) in her father; Heart disease in her father; Heart failure in her father; Hypertension in her father.    Social History:    reports that she quit smoking about 16 years ago. Her smoking use included cigarettes. She has never used smokeless tobacco. She reports current alcohol  use of about 3.0 standard drinks of alcohol per week. She reports that she does not use drugs.    Review of Systems  Negative except as noted in the HPI.      Objective:      Physical Exam  Constitutional:       Appearance: Normal appearance.      Comments: Ambulates with walker for assistance   HENT:      Head: Normocephalic.      Mouth/Throat:      Mouth: Mucous membranes are moist.   Eyes:      Extraocular Movements: Extraocular movements intact.      Conjunctiva/sclera: Conjunctivae normal.      Pupils: Pupils are equal, round, and reactive to light.   Cardiovascular:      Rate and Rhythm: Normal rate and regular rhythm.      Pulses: Normal pulses.      Heart sounds: Normal heart sounds.   Pulmonary:      Effort: Pulmonary effort is normal.      Breath sounds: Normal breath sounds.   Abdominal:      General: Bowel sounds are normal.      Palpations: Abdomen is soft.   Musculoskeletal:         General: Normal range of motion.      Cervical back: Normal range of motion and neck supple.   Skin:     General: Skin is warm and dry.   Neurological:      General: No focal deficit present.      Mental Status: She is alert and oriented to person, place, and time.   Psychiatric:         Mood and Affect: Mood normal.         Behavior: Behavior normal.         Thought Content: Thought content normal.         Judgment: Judgment normal.         Home Medications:     Current Outpatient Medications   Medication Sig    albuterol (PROVENTIL/VENTOLIN HFA) 90 mcg/actuation inhaler Inhale 1 puff into the lungs every 4 (four) hours as needed.    ANORO ELLIPTA 62.5-25 mcg/actuation DsDv Inhale 1 puff into the lungs once daily.    cholecalciferol, vitamin D3, 1,250 mcg (50,000 unit) capsule Take 1 capsule (50,000 Units total) by mouth every 7 days.    lisinopriL (PRINIVIL,ZESTRIL) 5 MG tablet Take 1 tablet (5 mg total) by mouth once daily.    metoprolol succinate (TOPROL-XL) 25 MG 24 hr tablet Take 1 tablet (25 mg total) by mouth once  daily.    mexiletine (MEXITIL) 200 MG Cap Take 200 mg by mouth every 8 (eight) hours.    nitroGLYCERIN (NITROSTAT) 0.4 MG SL tablet Place 1 tablet (0.4 mg total) under the tongue every 5 (five) minutes as needed for Chest pain.    omega-3 fatty acids/fish oil (FISH OIL-OMEGA-3 FATTY ACIDS) 300-1,000 mg capsule Take 3 capsules by mouth 2 (two) times a day.    paroxetine (PAXIL) 10 MG tablet Take 10 mg by mouth once daily.    polyethylene glycol (GLYCOLAX) 17 gram PwPk Take by mouth. (Patient taking differently: Take by mouth. Every other day)    traMADoL (ULTRAM) 50 mg tablet Take 50 mg by mouth every 6 (six) hours as needed.    omeprazole (PRILOSEC) 40 MG capsule Take 1 capsule (40 mg total) by mouth every morning.     Current Facility-Administered Medications   Medication Frequency    diazePAM tablet 5 mg On Call Procedure     Facility-Administered Medications Ordered in Other Visits   Medication Frequency    sodium chloride 0.9% flush 10 mL PRN     Laboratory Results:     Recent Results (from the past 40 weeks)   IN OFFICE EKG 12-LEAD (to [place])    Collection Time: 10/29/24  9:15 AM   Result Value Ref Range    QRS Duration 148 ms    [facility] QTC Calculation 477 ms   Echo    Collection Time: 12/20/24 11:33 AM   Result Value Ref Range    BSA 1.75 m2    Ojeda's Biplane MOD Ejection Fraction 67 %    A2C EF 69 %    A4C EF 63 %    LVOT stroke volume 47.1 cm3    LVIDd 3.8 3.5 - 6.0 cm    LV Systolic Volume 17.95 mL    LV Systolic Volume Index 10.3 mL/m2    LVIDs 2.3 2.1 - 4.0 cm    LV Diastolic Volume 60.05 mL    LV ESV A4C 16.71 mL    LV Diastolic Volume Index 34.31 mL/m2    LV EDV A2C [id number] mL    LV EDV A4C 37.16 mL    Left Ventricular End Systolic Volume by Teichholz Method 17.95 mL    Left Ventricular End Diastolic Volume by Teichholz Method 60.05 mL    IVS 1.1 0.6 - 1.1 cm    LVOT diameter 2.0 cm    LVOT area 3.1 cm2    FS 39.5 28 - 44 %    Left Ventricle Relative Wall Thickness 0.53 cm    PW 1.0 0.6 - 1.1  cm    LV mass 125.8 g    LV Mass Index 71.9 g/m2    MV Peak E Y[place] 0.47 m/s    TDI LATERAL 0.07 m/s    MV Peak A Y[place] 0.54 m/s    TR Max Yosi 2.42 m/s    E/A ratio 0.87     E wave deceleration time 135.61 msec    LV LATERAL E/E' RATIO 6.71 m/s    LVOT peak yosi 0.7 m/s    Left Ventricular Outflow Tract Mean Velocity 0.49 cm/s    Left Ventricular Outflow Tract Mean Gradient 1.12 mmHg    TAPSE 1.51 cm    LA size 3.20 cm    RA Major Axis 3.32 cm    AV mean gradient 3.3 mmHg    AV peak gradient 5.8 mmHg    Ao peak yosi 1.2 m/s    Ao VTI 31.6 cm    LVOT peak VTI 15.0 cm    AV valve area 1.5 cm²    AV Velocity Ratio 0.58     AV index (prosthetic) 0.47     TEJ by Velocity Ratio 1.8 cm²    Mr max yosi 2.93 m/s    MV mean gradient 1 mmHg    MV peak gradient 2 mmHg    MV valve area by continuity eq 3.71 cm2    MV VTI 12.7 cm    Triscuspid Valve Regurgitation Peak Gradient 23 mmHg    ZLVIDS -2.09     ZLVIDD -2.42     LA area A4C 8.99 cm2    Mitral Valve Heart Rate 68 bpm    TV resting pulmonary artery pressure 31 mmHg    RV TB RVSP 10 mmHg    Est. RA pres 8 mmHg    IVC diameter 1.3 cm   Comprehensive Metabolic Panel    Collection Time: 12/31/24  8:55 AM   Result Value Ref Range    Sodium 142 136 - 145 mmol/L    Potassium 4.6 3.5 - 5.1 mmol/L    Chloride 104 98 - 107 mmol/L    CO2 26 22 - 29 mmol/L    Glucose 115 (H) 74 - 100 mg/dL    Blood Urea Nitrogen 9.0 (L) 9.8 - 20.1 mg/dL    Creatinine 0.66 0.55 - 1.02 mg/dL    Calcium 9.2 8.4 - 10.2 mg/dL    Protein Total 6.6 6.4 - 8.3 gm/dL    Albumin 3.7 3.5 - 5.0 g/dL    Globulin 2.9 2.4 - 3.5 gm/dL    Albumin/Globulin Ratio 1.3 1.1 - 2.0 ratio    Bilirubin Total 0.6 <=1.5 mg/dL    ALP 74 40 - 150 unit/L    ALT 25 0 - 55 unit/L    AST 30 5 - 34 unit/L    eGFR >60 mL/min/1.73/m2    Anion Gap 12.0 mEq/L    BUN/Creatinine Ratio 14    Hemoglobin A1C    Collection Time: 12/31/24  8:55 AM   Result Value Ref Range    Hemoglobin A1c 5.1 <=7.0 %    Estimated Average Glucose 99.7 mg/dL   TSH     Collection Time: 12/31/24  8:55 AM   Result Value Ref Range    TSH 2.343 0.350 - 4.940 uIU/mL   Lipid Panel    Collection Time: 12/31/24  8:55 AM   Result Value Ref Range    Cholesterol Total 208 (H) <=200 mg/dL    HDL Cholesterol 69 (H) 35 - 60 mg/dL    Triglyceride 155 (H) 37 - 140 mg/dL    Cholesterol/HDL Ratio 3 0 - 5    Very Low Density Lipoprotein 31     LDL Cholesterol 108.00 50.00 - 140.00 mg/dL   Hepatitis C Antibody    Collection Time: 12/31/24  8:55 AM   Result Value Ref Range    Hep C Ab Interp Nonreactive Nonreactive   Vitamin D    Collection Time: 12/31/24  8:55 AM   Result Value Ref Range    Vitamin D 20 (L) 30 - 80 ng/mL   CBC with Differential    Collection Time: 12/31/24  8:55 AM   Result Value Ref Range    WBC 5.93 4.50 - 11.50 x10(3)/mcL    RBC 5.09 4.20 - 5.40 x10(6)/mcL    Hgb 16.2 (H) 12.0 - 16.0 g/dL    Hct 49.2 (H) 37.0 - 47.0 %    MCV 96.7 (H) 80.0 - 94.0 fL    MCH 31.8 (H) 27.0 - 31.0 pg    MCHC 32.9 (L) 33.0 - 36.0 g/dL    RDW 13.2 11.5 - 17.0 %    Platelet 262 130 - 400 x10(3)/mcL    MPV 10.2 7.4 - 10.4 fL    Neut % 59.0 %    Lymph % 29.7 %    Mono % 8.1 %    Eos % 2.2 %    Basophil % 0.5 %    Lymph # 1.76 0.6 - 4.6 x10(3)/mcL    Neut # 3.50 2.1 - 9.2 x10(3)/mcL    Mono # 0.48 0.1 - 1.3 x10(3)/mcL    Eos # 0.13 0 - 0.9 x10(3)/mcL    Baso # 0.03 <=0.2 x10(3)/mcL    Imm Gran # 0.03 0 - 0.04 x10(3)/mcL    Imm Grans % 0.5 %   Specimen to Pathology    Collection Time: 01/27/25  1:23 PM   Result Value Ref Range    Case Report       [facility] Surgical Pathology                            Case: [id number]                                 Authorizing Provider:  Lacy Miles MD   Collected:           01/27/2025 01:23 PM          Ordering Location:     [facility] -       Received:            01/28/2025 09:30 AM                                 Endoscopy                                                                    Pathologist:           Jadyn Guevara MD                            "                             Specimens:   1) - Large intestine, Ascending Colon, ascending colon polyp                                        2) - Large intestine, Colon, Transverse, transverse colon polyps x's 2                     Clinical Information       Procedure:  COLONOSCOPY, WITH POLYPECTOMY USING SNARE  Pre-op Diagnosis:  Z86.0100 - Personal history of colonic polyps [ICD-10-CM]  Post-op Diagnosis:  Z86.0100 - Personal history of colonic polyps [ICD-10-CM]      Final Diagnosis         1. Ascending colon polyp, Polypectomy:  - Tubular adenoma.  - No evidence of malignancy.    2. Transverse colon polyps x 2, Polypectomy:  - Tubular adenoma x 2.  - No evidence of malignancy.         Microscopic Description       A microscopic examination was performed and the diagnosis reflects the findings.          Gross Description       1. Large intestine, Ascending Colon: ascending colon polyp  Received in formalin are multiple fragments of tan soft tissue ranging from 1 to 4 mm. Entirely submitted in a single cassette.  2. Large intestine, Colon, Transverse: transverse colon polyps x's 2  Received in formalin are multiple fragments of tan soft tissue ranging from 1 to 5 mm. Entirely submitted in a single cassette.      Report Footnotes       Unless the case is a "gross only" or additional testing only, the final diagnosis for each specimen is based on a microscopic examination of appropriate tissue sections.     EKG 12-lead    Collection Time: 03/12/25  8:12 AM   Result Value Ref Range    QRS Duration 104 ms    [facility] QTC Calculation 501 ms   Nuclear Stress - Cardiology Interpreted    Collection Time: 03/12/25 10:37 AM   Result Value Ref Range    85% Max Predicted      Max Predicted      OHS CV CPX PATIENT IS MALE 0.0     OHS CV CPX PATIENT IS FEMALE 1.0     dose 31.9 mcg/kg/min    dose 10.8 mcg/kg/min    HR at rest 68 bpm    Systolic blood pressure 125 mmHg    Diastolic blood pressure 71 mmHg    RPP 8,500     " Exercise duration (min) 0 minutes    Exercise duration (sec) 41 seconds    Peak HR 96 bpm    Peak Systolic  mmHg    Peak Diatolic BP 67 mmHg    Peak RPP 12,288     Estimated METs 1     % Max HR Achieved 62     Nuc Rest EF 89     Nuc Stress EF 86 %   Basic metabolic panel    Collection Time: 04/01/25  1:00 PM   Result Value Ref Range    Sodium 141 136 - 145 mmol/L    Potassium 4.2 3.5 - 5.1 mmol/L    Chloride 108 (H) 98 - 107 mmol/L    CO2 31 (H) 22 - 29 mmol/L    Glucose 91 74 - 100 mg/dL    Blood Urea Nitrogen 13.3 9.8 - 20.1 mg/dL    Creatinine 0.65 0.55 - 1.02 mg/dL    BUN/Creatinine Ratio 20     Calcium 9.1 8.4 - 10.2 mg/dL    Anion Gap 2.0 mEq/L    eGFR >60 mL/min/1.73/m2   Protime-INR    Collection Time: 04/01/25  1:00 PM   Result Value Ref Range    PT 13.3 11.4 - 14.0 seconds    INR 1.0 <=1.3   APTT    Collection Time: 04/01/25  1:00 PM   Result Value Ref Range    PTT 29.2 23.2 - 33.7 seconds   CBC with Differential    Collection Time: 04/01/25  1:00 PM   Result Value Ref Range    WBC 7.33 4.50 - 11.50 x10(3)/mcL    RBC 4.13 (L) 4.20 - 5.40 x10(6)/mcL    Hgb 13.2 12.0 - 16.0 g/dL    Hct 39.8 37.0 - 47.0 %    MCV 96.4 (H) 80.0 - 94.0 fL    MCH 32.0 (H) 27.0 - 31.0 pg    MCHC 33.2 33.0 - 36.0 g/dL    RDW 14.3 11.5 - 17.0 %    Platelet 253 130 - 400 x10(3)/mcL    MPV 10.5 (H) 7.4 - 10.4 fL    Neut % 56.3 %    Lymph % 33.3 %    Mono % 7.4 %    Eos % 2.2 %    Basophil % 0.5 %    Imm Grans % 0.3 %    Neut # 4.13 2.1 - 9.2 x10(3)/mcL    Lymph # 2.44 0.6 - 4.6 x10(3)/mcL    Mono # 0.54 0.1 - 1.3 x10(3)/mcL    Eos # 0.16 0 - 0.9 x10(3)/mcL    Baso # 0.04 <=0.2 x10(3)/mcL    Imm Gran # 0.02 0.00 - 0.04 x10(3)/mcL    NRBC% 0.0 %     Assessment/Plan:     Problem List Items Addressed This Visit          GI    Reactive gastropathy - Primary    She underwent EGD November 1, 2023 with findings of ectopic gastric mucosa in the upper 3rd of the esophagus, esophagogastric landmarks identified, erythematous mucosa in the  antrum, normal examined duodenum.  Pathology revealed reactive gastritis/gastropathy, negative for H pylori organisms, negative for dysplasia.  GERD lifestyle modifications  Reflux precautions  Limit NSAID use  Recommend alcohol cessation and continued tobacco cessation  Continue omeprazole 40 mg as directed (refilled)  Call with updates   Follow-up clinic visit with NP in 6 months         Relevant Medications    omeprazole (PRILOSEC) 40 MG capsule    Chronic idiopathic constipation    Recommend soluble fiber supplementation  Avoid straining or sitting on the toilet for long periods of time  Continue MiraLax as directed         History of colonic polyps    Colonoscopy 2021 revealed hemorrhoids found on perianal exam, examined portion of ileum normal, multiple fragments of adenomatous polyps x5 in the ascending colon with polypectomy and no evidence of malignancy, multiple fragments of adenomatous polyps x2 in the transverse colon with polypectomy and no evidence of malignancy, multiple fragments of adenomatous polyps x2 in the descending colon with polypectomy and no evidence of malignancy, and adenomatous polyps x3 in the sigmoid colon with polypectomy and no evidence of malignancy.  She was recommended repeat colonoscopy in 3 years.   She underwent colonoscopy 2025 with findings of the examined portion of the ileum was normal. One 3 mm polyp in the ascending colon, removed with a cold snare. Resected and retrieved. Two 1 to 2 mm polyps in the transverse colon, removed with a cold snare. Resected and retrieved. Internal hemorrhoids. No specimens collected. She was recommended repeat colonoscopy in 5 years. Pathology revealed the followin. Ascending colon polyp, Polypectomy:  - Tubular adenoma.  - No evidence of malignancy.     2. Transverse colon polyps x 2, Polypectomy:  - Tubular adenoma x 2.  - No evidence of malignancy.         Hepatic steatosis    Abdominal ultrasound   2021 revealed diffuse hepatic steatosis and normal gallbladder.    LFTs normal December 31, 2024.  Lifestyle and dietary modifications provided  Recommend good control of comorbidities  CBC, CMP, PT/INR in 3 months   Abdominal ultrasound  FibroScan          Relevant Orders    CBC Auto Differential    Comprehensive Metabolic Panel    Protime-INR    US Abdomen Limited    US Elastography Liver w/imaging

## 2025-06-23 NOTE — ASSESSMENT & PLAN NOTE
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  Pathology revealed reactive gastritis/gastropathy, negative for H pylori organisms, negative for dysplasia.  GERD lifestyle modifications  Reflux precautions  Limit NSAID use  Recommend alcohol cessation and continued tobacco cessation  Continue omeprazole 40 mg as directed (refilled)  Call with updates   Follow-up clinic visit with NP in 6 months

## 2025-06-23 NOTE — ASSESSMENT & PLAN NOTE
Abdominal ultrasound April 7, 2021 revealed diffuse hepatic steatosis and normal gallbladder.    LFTs normal December 31, 2024.  Lifestyle and dietary modifications provided  Recommend good control of comorbidities  CBC, CMP, PT/INR in 3 months   Abdominal ultrasound  FibroScan

## 2025-06-30 ENCOUNTER — OFFICE VISIT (OUTPATIENT)
Dept: INTERNAL MEDICINE | Facility: CLINIC | Age: 60
End: 2025-06-30
Payer: MEDICAID

## 2025-06-30 VITALS
RESPIRATION RATE: 14 BRPM | OXYGEN SATURATION: 98 % | HEIGHT: 67 IN | DIASTOLIC BLOOD PRESSURE: 49 MMHG | WEIGHT: 149 LBS | SYSTOLIC BLOOD PRESSURE: 85 MMHG | HEART RATE: 56 BPM | BODY MASS INDEX: 23.39 KG/M2 | TEMPERATURE: 98 F

## 2025-06-30 DIAGNOSIS — H04.551 OBSTRUCTION OF RIGHT TEAR DUCT: ICD-10-CM

## 2025-06-30 DIAGNOSIS — F34.1 PERSISTENT DEPRESSIVE DISORDER: ICD-10-CM

## 2025-06-30 DIAGNOSIS — I10 HTN (HYPERTENSION), BENIGN: ICD-10-CM

## 2025-06-30 DIAGNOSIS — E04.2 MULTINODULAR GOITER: ICD-10-CM

## 2025-06-30 DIAGNOSIS — E55.9 VITAMIN D DEFICIENCY: ICD-10-CM

## 2025-06-30 DIAGNOSIS — D23.30 DERMOID CYST OF FACE: ICD-10-CM

## 2025-06-30 DIAGNOSIS — Z12.2 SCREENING FOR LUNG CANCER: Primary | ICD-10-CM

## 2025-06-30 DIAGNOSIS — Z01.419 WELL WOMAN EXAM: ICD-10-CM

## 2025-06-30 PROCEDURE — 99215 OFFICE O/P EST HI 40 MIN: CPT | Mod: PBBFAC

## 2025-06-30 RX ORDER — PAROXETINE 10 MG/1
10 TABLET, FILM COATED ORAL DAILY
Qty: 90 TABLET | Refills: 3 | Status: SHIPPED | OUTPATIENT
Start: 2025-06-30 | End: 2026-06-30

## 2025-06-30 RX ORDER — METOPROLOL SUCCINATE 25 MG/1
12.5 TABLET, EXTENDED RELEASE ORAL DAILY
Qty: 45 TABLET | Refills: 3 | Status: SHIPPED | OUTPATIENT
Start: 2025-06-30 | End: 2026-06-30

## 2025-06-30 RX ORDER — HYDROCODONE BITARTRATE AND ACETAMINOPHEN 7.5; 325 MG/1; MG/1
1 TABLET ORAL EVERY 6 HOURS PRN
COMMUNITY

## 2025-06-30 RX ORDER — UMECLIDINIUM BROMIDE AND VILANTEROL TRIFENATATE 62.5; 25 UG/1; UG/1
1 POWDER RESPIRATORY (INHALATION) DAILY
Status: CANCELLED | OUTPATIENT
Start: 2025-06-30

## 2025-06-30 RX ORDER — LISINOPRIL 5 MG/1
2.5 TABLET ORAL DAILY
Qty: 45 TABLET | Refills: 3 | Status: SHIPPED | OUTPATIENT
Start: 2025-06-30 | End: 2026-06-30

## 2025-06-30 RX ORDER — ALBUTEROL SULFATE 90 UG/1
1 INHALANT RESPIRATORY (INHALATION) EVERY 4 HOURS PRN
Qty: 18 G | Status: CANCELLED | OUTPATIENT
Start: 2025-06-30

## 2025-06-30 RX ORDER — IBUPROFEN 800 MG/1
800 TABLET, FILM COATED ORAL 3 TIMES DAILY PRN
COMMUNITY

## 2025-06-30 NOTE — PROGRESS NOTES
I have reviewed and agree with the resident's findings, including all diagnostic interpretations and plans as written.    Patient is here to establish care. She has a PMHx of Mytonic dystrophy,  HTN, HLD, LBBB, hepatic steatosis, colonic polyps, reactive gastropathy, recent LHC 4/10/2025 with normal coronaries, thyroid nodules s/p R hemithyroidectomy. Pt with L upper lip flesh colored lesion for the last year that is nonpainful--mostly like a cyst. Agree with referral to  procedure clinic for further evaluation. She has upcoming US Fibroscan and TTE next month. Referring to IR with hx of 2 L thyroid nodules that are classified as TR 4 on US in 12/2024. Remainder of care as documented per resident.     Denice Hernandez MD

## 2025-06-30 NOTE — ADDENDUM NOTE
Addended by: LEANNA JOHNSON on: 6/30/2025 03:31 PM     Modules accepted: Orders, Level of Service

## 2025-06-30 NOTE — PROGRESS NOTES
"[facility] INTERNAL MEDICINE CLINIC NOTE    Patient name: Cheryl Bauer  YOB: 1965   MRN: [id number]  Appointment date: 6/30/2025  Appointment time: 08:55 AM    Subjective     HPI    Cheryl Bauer is a 60 y.o.  American female with a past medical history of tobacco use disorder (quit 2013), chronic obstructive pulmonary disease, hypertension, hypertriglyceridemia, multinodular goiter s/p right hemithyroidectomy, left bundle branch block, Steinert myotonic muscular dystrophy type 1, who presented to  clinic today for establishment of care. Patient has not seen PCP in quite some time. Patient states she is overall doing well however has two issues she would like to discuss. She notes developing a right medial eye "bump" of 3 weeks duration that is causing her irritation such that it "itches" but otherwise has not been red, hot, or swollen. Denies red, itchy, watery eyes, and/or vision changes. Patient also reports a chronic, gradually enlarged left upper lip "bump." States she originally noticed the bump several years ago and was sent to [facility] minor procedural clinic for removal but after the wound healed still felt the "bump." The "bump" has continued to enlarge since that time and is painless unless manipulated. Denies dental disease or overlying skin changes. Denies fever, chills, sweats. Denies headache, lightheadedness/dizziness, tinnitus, syncope, seizure. Denies cough, hemoptysis, shortness of breath, chest pain. Denies abdominal pain, nausea, vomiting, hematemesis, constipation, diarrhea, hematochezia, melena. Denies increased or decreased urinary frequency, dysuria, hematuria. Denies any other acute complaints.     Interval History    N/A    Past Medical History:  Past Medical History:   Diagnosis Date    COPD (chronic obstructive pulmonary disease)     Hypertension     Muscular dystrophy      Past Surgical History:  Past Surgical History:   Procedure Laterality Date    ANGIOGRAM, " CORONARY, WITH LEFT HEART CATHETERIZATION N/A 2025    Procedure: Angiogram, Coronary, with Left Heart Cath;  Surgeon: Braeden Aviles MD;  Location: [facility] CATH LAB;  Service: Cardiology;  Laterality: N/A;    CARPAL TUNNEL RELEASE Left     COLONOSCOPY, WITH POLYPECTOMY USING SNARE N/A 2025    Procedure: COLONOSCOPY, WITH POLYPECTOMY USING SNARE;  Surgeon: Lacy Miles MD;  Location: [facility] ENDOSCOPY;  Service: Gastroenterology;  Laterality: N/A;  Due 2024    EGD, WITH CLOSED BIOPSY N/A 2023    Procedure: EGD, WITH CLOSED BIOPSY;  Surgeon: Lacy Miles MD;  Location: [facility] ENDOSCOPY;  Service: Gastroenterology;  Laterality: N/A;    HYSTERECTOMY      KNEE SURGERY Left     THYROIDECTOMY Right     TONSILLECTOMY AND ADENOIDECTOMY      TUBAL LIGATION       Family History:  Family History   Problem Relation Name Age of Onset    Hypertension Father      Heart failure Father      Heart disease Father      Heart attack Father  68    Diabetes Father       Social History:  Social History     Tobacco Use    Smoking status: Former     Current packs/day: 0.00     Types: Cigarettes     Quit date: 2009     Years since quittin.5    Smokeless tobacco: Never    Tobacco comments:     Quit 15 years ago   Substance Use Topics    Alcohol use: Yes     Alcohol/week: 3.0 standard drinks of alcohol     Types: 3 Cans of beer per week     Comment: daily (1 wk no alcohol 25)    Drug use: Never     Allergies:  Review of patient's allergies indicates:   Allergen Reactions    Phenytoin sodium extended Hives and Other (See Comments)     Home Medications:  Prior to Admission medications    Medication Sig Start Date End Date Taking? Authorizing Provider   albuterol (PROVENTIL/VENTOLIN HFA) 90 mcg/actuation inhaler Inhale 1 puff into the lungs every 4 (four) hours as needed. 10/19/22   Provider, Historical   ANORO ELLIPTA 62.5-25 mcg/actuation DsDv Inhale 1 puff into the lungs once daily. 22    Provider, Historical   cholecalciferol, vitamin D3, 1,250 mcg (50,000 unit) capsule Take 1 capsule (50,000 Units total) by mouth every 7 days. 1/3/25 7/2/25  Katiana Jimenez FNP   lisinopriL (PRINIVIL,ZESTRIL) 5 MG tablet Take 1 tablet (5 mg total) by mouth once daily. 3/14/25 3/14/26  Katiana Jimenez FNP   metoprolol succinate (TOPROL-XL) 25 MG 24 hr tablet Take 1 tablet (25 mg total) by mouth once daily. 3/13/25 3/13/26  Fabiola Montoya FNP   mexiletine (MEXITIL) 200 MG Cap Take 200 mg by mouth every 8 (eight) hours.    Provider, Historical   nitroGLYCERIN (NITROSTAT) 0.4 MG SL tablet Place 1 tablet (0.4 mg total) under the tongue every 5 (five) minutes as needed for Chest pain. 4/26/24 6/23/25  Fabiola Montoya FNP   omega-3 fatty acids/fish oil (FISH OIL-OMEGA-3 FATTY ACIDS) 300-1,000 mg capsule Take 3 capsules by mouth 2 (two) times a day.    Provider, Historical   omeprazole (PRILOSEC) 40 MG capsule Take 1 capsule (40 mg total) by mouth every morning. 6/23/25   Karma Wong FNP   paroxetine (PAXIL) 10 MG tablet Take 10 mg by mouth once daily.    Provider, Historical   polyethylene glycol (GLYCOLAX) 17 gram PwPk Take by mouth.  Patient taking differently: Take by mouth. Every other day    Provider, Historical   traMADoL (ULTRAM) 50 mg tablet Take 50 mg by mouth every 6 (six) hours as needed. 10/27/22   Provider, Historical     Review of Systems:  Review of Systems   Constitutional:  Negative for chills, diaphoresis, fatigue and fever.   HENT:  Negative for ear pain, hearing loss, rhinorrhea, sore throat, tinnitus and trouble swallowing.         Left lip bump   Eyes:  Positive for itching. Negative for pain, redness and visual disturbance.   Respiratory:  Negative for cough, chest tightness and shortness of breath.    Cardiovascular:  Negative for chest pain and palpitations.   Gastrointestinal:  Negative for abdominal pain, constipation, diarrhea, nausea and vomiting.   Genitourinary:   Negative for difficulty urinating, dysuria, frequency, hematuria and urgency.   Musculoskeletal:  Negative for arthralgias and myalgias.   Skin:  Negative for rash and wound.   Neurological:  Negative for dizziness, seizures, syncope, weakness, light-headedness, numbness and headaches.   Psychiatric/Behavioral:  Negative for confusion.        Objective     Vitals:   Vitals:    06/30/25 0945   BP: (!) 85/49   Pulse: (!) 56   Resp: 14   Temp: 97.9 °F (36.6 °C)       Physical Examination:   Physical Exam  Vitals reviewed.   Constitutional:       General: She is not in acute distress.     Appearance: Normal appearance. She is normal weight. She is not ill-appearing, toxic-appearing or diaphoretic.   HENT:      Head: Normocephalic and atraumatic.      Right Ear: External ear normal.      Left Ear: External ear normal.   Eyes:      General: No scleral icterus.        Right eye: No discharge.         Left eye: No discharge.      Extraocular Movements: Extraocular movements intact.      Conjunctiva/sclera: Conjunctivae normal.      Pupils: Pupils are equal, round, and reactive to light.   Cardiovascular:      Rate and Rhythm: Regular rhythm. Bradycardia present.      Pulses: Normal pulses.      Heart sounds: Normal heart sounds. No murmur heard.     No friction rub. No gallop.   Pulmonary:      Effort: Pulmonary effort is normal. No respiratory distress.      Breath sounds: Normal breath sounds. No wheezing, rhonchi or rales.   Abdominal:      General: Abdomen is flat. Bowel sounds are normal. There is no distension.      Palpations: Abdomen is soft.      Tenderness: There is no abdominal tenderness. There is no guarding.   Musculoskeletal:         General: No swelling, tenderness, deformity or signs of injury. Normal range of motion.      Right lower leg: No edema.      Left lower leg: No edema.   Skin:     General: Skin is warm and dry.      Capillary Refill: Capillary refill takes less than 2 seconds.      Coloration:  Skin is not jaundiced.      Findings: No bruising, erythema or rash.   Neurological:      Mental Status: She is alert.      Comments: AAO to person, place, time, and situation; CN II-XII grossly intact       PREVENTIVE CARE:  Screening:    Mammo: Mammogram (01/09/2025) showed scattered fibroglandular disease without suspicious masses, asymmetry, distortions, or calcifications; repeat mammogram 2027  DEXA (age>65 or FRAX > 9.3%): Not indicated due to age and/or FRAX score  Lung Ca (30PY smoker age 55-79 or quit in last 15y): LDCT (10/18/2022) negative (Lung RADS-1) per report; no repeat scanning indicated as it has been greater than 15 years since patient quit smoking  Colon Ca: (age 45-75-annual FOBT, 5y flex + FOBTq3 or 10y c-scope): Colonoscopy (01/27/2025) showed one 3 mm sessile polyp, tubular adenoma in ascending colon s/p polypectomy, two 1 to 2 mm sessile polyps s/p polypectomy, tubular adenoma in transverse colon, and internal hemorrhoids; will repeat colonoscopy in 2030  AAA (smoker 65-76): Not indicated due to assigned sex  PAP (<65y.o): Last Vulvar bxp (2015) - SOFYA 1; Last PAP (2015) - NEM; no repeat PAP indicated as patient does not have a cervix; referred to [facility] for annual well woman    ID Titers and Vaccinations:    Immunization History   Administered Date(s) Administered    COVID-19, MRNA, LN-S, PF (Pfizer) (Gray Cap) 06/21/2022    COVID-19, MRNA, LN-S, PF (Pfizer) (Purple Cap) 08/23/2021, 09/13/2021    COVID-19, mRNA, LNP-S, PF (Moderna) Ages 12+ 12/07/2023    Influenza 10/26/2010, 11/11/2011, 11/15/2012, 11/22/2013, 10/05/2015, 10/02/2017    Influenza - Quadrivalent 10/07/2016    Influenza - Quadrivalent - PF *Preferred* (6 months and older) 10/10/2014, 11/05/2018, 11/22/2020, 11/11/2021, 11/21/2022, 09/21/2023    Influenza - Trivalent - Deisi Fluzone MDV 10/26/2010, 11/11/2011, 11/15/2012, 11/22/2013, 10/05/2015    Influenza - Trivalent - Fluarix, Flulaval, Fluzone, Afluria - PF 10/02/2017  "   Pneumococcal Polysaccharide - 23 Valent 11/05/2018    Tdap 07/02/2018    Zoster Recombinant 03/03/2021, 05/06/2021     Influenza vaccination: Will discuss next visit  Covid vaccination: Received 4 doses of Moderna  Pneumococcal vaccination (Age 19-64 w/ chronic heart, lung, liver dz, DM, alcoholism, tobacco use): Received first dose; needs 2nd dose; will discuss next visit  Pneumococcal vaccination (Age > 65 or high risk): N/A  Tetanus vaccination: UTD  Zoster vaccination (immunocomp or >=49yo w 2 doses: 0, 2-6mo): UTD  HIV screening (once age 15-65):  No results found for: "HIV1X2", "[id number]"    Hepatitis screening:   Lab Results   Component Value Date    HEPCAB Nonreactive 12/31/2024      ASSESSMENT&PLAN:    Steinert myotonic muscular dystrophy type 1  -diagnosed decades ago  -on ibuprofen 800 mg tid prn and norco 7.5-325 mg q6h prn for pain management  -management per neurologist in [place], Dr. Haines    Tobacco use disorder (quit 2009)  Chronic obstructive pulmonary disease  -continue oxygen supplementation to maintain oxygen saturation 88-92%  -currently requiring 2L NC only at night  -continue anoro ellipta qd and albuterol p4h prn    Hypertension  BP 85/49  -patient asymptomatic  -overly strict control  -decreased metoprolol succinate from 25 to 12.5 mg qd and lisinopril from 5 to 2.5 mg qd    Left bundle branch block  Dyspnea on exertion  Hyperlipidemia  -follows with [facility]  -asymptomatic  -Lexiscan (03/12/2024) showed no evidence of myocardial ischemia or infarction  -repeat Lexiscan (03/12/2025) showed moderate-to-severe intensity, medium-sized, mostly reversible perfusion abnormality with some fixed areas in the basal to mid lateral wall in the typical distribution of the left circumflex territory  -s/p LHC (04/23/2025) which showed normal coronary arteries  -continue metoprolol succinate 12.5 mg qd and SL Nitro PRN CP  -statin and zetia contraindicated due to hx of muscular " dystrophy  -continue omega 3 fatty acids  -EKG/Echo every 6 months and stress test annually per patient's neurologist Dr. Haines    Multinodular goiter s/p right hemithyroidectomy  -US soft tissue head and neck (12/31/2024) reported 2.1 cm isoechoic nodule lower left thyroid lobe and a 1.6 cm a isoechoic nodule upper left thyroid lobe; recommend thyroid nodule biopsy  -will refer to [facility] IR for US guided biopsy    Metabolic associated steatotic liver disease  -follows with [facility] GI  -LFTs wnl  -abdominal ultrasound (04/07/2021) revealed diffuse hepatic steatosis and normal gallbladder  -FibroScan ordered but not complete to date  -lifestyle and dietary modifications  -control medical co-morbidities    Reactive gastropathy  -follows with [facility] GI  -EGD (11/01/2023) showed 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  Pathology revealed reactive gastritis/gastropathy, negative for H pylori organisms, negative for dysplasia.  -GERD lifestyle modifications  -reflux precautions  -limit NSAID use  -recommend alcohol cessation and continued tobacco cessation  -continue omeprazole 40 mg as directed (refilled)    Chronic idiopathic constipation  -recommend soluble fiber supplementation  -avoid straining or sitting on the toilet for long periods of time  -continue MiraLax as directed    Dermoid cyst of face  -referred to [facility] minor procedural clinic for removal  -may require referral to dermatology due to cyst being located on face    Right tear duct obstruction  -instructed patient to try warm compresses at home  -will refer to [facility] ophthalmology for annual eye exams and follow up if warm compresses are ineffective    Health Maintenance:  -lab work ordered  -initiated and/or refilled medications as above  -screenings ordered and/or UTD as above  -vaccinations as above    Future Appointments   Date Time Provider  Department Center   7/15/2025  8:00 AM FIBROSCAN, [facility] GASTROENTEROLOGY [facility] GASTRO [place]   7/15/2025  8:00 AM [facility] ECHO 01 [facility] ECHO [place]   7/15/2025  9:00 AM [facility] US1 [facility] US [place]   12/19/2025 10:45 AM Fabiola Montoya, CARMELOP [facility] CARD [place]   1/30/2026 10:30 AM Karma Wong FNP [facility] GASTRO [place]       Corey Block MD  [facility]

## 2025-07-15 ENCOUNTER — HOSPITAL ENCOUNTER (OUTPATIENT)
Dept: CARDIOLOGY | Facility: HOSPITAL | Age: 60
Discharge: HOME | End: 2025-07-15
Attending: NURSE PRACTITIONER
Payer: MEDICAID

## 2025-07-15 ENCOUNTER — HOSPITAL ENCOUNTER (OUTPATIENT)
Dept: RADIOLOGY | Facility: HOSPITAL | Age: 60
Discharge: HOME | End: 2025-07-15
Attending: NURSE PRACTITIONER
Payer: MEDICAID

## 2025-07-15 ENCOUNTER — PROCEDURE VISIT (OUTPATIENT)
Dept: GASTROENTEROLOGY | Facility: CLINIC | Age: 60
End: 2025-07-15
Attending: NURSE PRACTITIONER
Payer: MEDICAID

## 2025-07-15 DIAGNOSIS — R06.02 SHORTNESS OF BREATH: ICD-10-CM

## 2025-07-15 DIAGNOSIS — K76.0 HEPATIC STEATOSIS: ICD-10-CM

## 2025-07-15 DIAGNOSIS — K76.0 HEPATIC STEATOSIS: Primary | ICD-10-CM

## 2025-07-15 PROCEDURE — 91200 LIVER ELASTOGRAPHY: CPT | Mod: 26,S$PBB,, | Performed by: NURSE PRACTITIONER

## 2025-07-15 PROCEDURE — 76705 ECHO EXAM OF ABDOMEN: CPT | Mod: TC

## 2025-07-15 PROCEDURE — 91200 LIVER ELASTOGRAPHY: CPT | Mod: PBBFAC | Performed by: NURSE PRACTITIONER

## 2025-07-15 PROCEDURE — 93306 TTE W/DOPPLER COMPLETE: CPT

## 2025-07-15 NOTE — PROCEDURES
Fibroscan Procedure     Name: Cheryl Bauer  Date of Procedure : 7/15/2025  Interpreting Physician: Karma Wong NP  Diagnosis: MetALD    Probe: M    Fibroscan reading: 3.6 kPa    Fibrosis: F0     CAP readin dB/m    Steatosis: S3      Miscellaneous:       Repeat: 1 year

## 2025-07-15 NOTE — PROGRESS NOTES
Patient here for FibroScan visit. Reports NPO X3 hours and denies any implanted electronic devices. Position patient for the procedure to expose the right rib cage. Explained procedure to the patient. FibroScan exam complete and was tolerated without any problems.     patient

## 2025-07-16 ENCOUNTER — TELEPHONE (OUTPATIENT)
Dept: CARDIOLOGY | Facility: CLINIC | Age: 60
End: 2025-07-16
Payer: MEDICAID

## 2025-07-16 LAB
APICAL FOUR CHAMBER EJECTION FRACTION: 62 %
APICAL TWO CHAMBER EJECTION FRACTION: 60 %
AV INDEX (PROSTH): 0.84
AV MEAN GRADIENT: 2 MMHG
AV PEAK GRADIENT: 4 MMHG
AV VELOCITY RATIO: 0.8
CV ECHO LV RWT: 0.5 CM
DOP CALC AO PEAK VEL: 1 M/S
DOP CALC AO VTI: 24.3 CM
DOP CALC LVOT PEAK VEL: 0.8 M/S
DOP CALC MV VTI: 23.1 CM
DOP CALCLVOT PEAK VEL VTI: 20.3 CM
E WAVE DECELERATION TIME: 168 MSEC
E/A RATIO: 1.45
E/E' RATIO: 8 M/S
ECHO LV POSTERIOR WALL: 1.1 CM (ref 0.6–1.1)
FRACTIONAL SHORTENING: 29.5 % (ref 28–44)
HR MV ECHO: 56 BPM
INTERVENTRICULAR SEPTUM: 1.5 CM (ref 0.6–1.1)
LEFT ATRIUM SIZE: 4 CM
LEFT INTERNAL DIMENSION IN SYSTOLE: 3.1 CM (ref 2.1–4)
LEFT VENTRICLE DIASTOLIC VOLUME: 86 ML
LEFT VENTRICLE END DIASTOLIC VOLUME APICAL 2 CHAMBER: 86.65 ML
LEFT VENTRICLE END DIASTOLIC VOLUME APICAL 4 CHAMBER: 62.17 ML
LEFT VENTRICLE SYSTOLIC VOLUME: 38 ML
LEFT VENTRICULAR INTERNAL DIMENSION IN DIASTOLE: 4.4 CM (ref 3.5–6)
LEFT VENTRICULAR MASS: 215.1 G
LV LATERAL E/E' RATIO: 6.4 M/S
LV SEPTAL E/E' RATIO: 9.7 M/S
LVED V (TEICH): 85.89 ML
LVES V (TEICH): 38 ML
LVOT MG: 1.31 MMHG
LVOT MV: 0.51 CM/S
MV MEAN GRADIENT: 1 MMHG
MV PEAK A VEL: 0.4 M/S
MV PEAK E VEL: 0.58 M/S
MV PEAK GRADIENT: 3 MMHG
MV STENOSIS PRESSURE HALF TIME: 48.74 MS
MV VALVE AREA P 1/2 METHOD: 4.51 CM2
OHS CV RV/LV RATIO: 0.55 CM
OHS LV EJECTION FRACTION SIMPSONS BIPLANE MOD: 63 %
PISA TR MAX VEL: 2.2 M/S
RA PRESSURE ESTIMATED: 3 MMHG
RIGHT VENTRICLE DIASTOLIC BASEL DIMENSION: 2.4 CM
RIGHT VENTRICULAR END-DIASTOLIC DIMENSION: 2.42 CM
RV TB RVSP: 5 MMHG
TDI LATERAL: 0.09 M/S
TDI SEPTAL: 0.06 M/S
TDI: 0.08 M/S
TR MAX PG: 20 MMHG
TRICUSPID ANNULAR PLANE SYSTOLIC EXCURSION: 2.1 CM
TV REST PULMONARY ARTERY PRESSURE: 22 MMHG

## 2025-07-16 NOTE — TELEPHONE ENCOUNTER
Discussed results of Echocardiogram with patient's spouse. All questions answered.  Verbalizes understanding.

## 2025-09-04 ENCOUNTER — OFFICE VISIT (OUTPATIENT)
Dept: OPHTHALMOLOGY | Facility: CLINIC | Age: 60
End: 2025-09-04
Payer: COMMERCIAL

## 2025-09-04 VITALS — HEIGHT: 67 IN | BODY MASS INDEX: 23.54 KG/M2 | WEIGHT: 150 LBS

## 2025-09-04 DIAGNOSIS — I10 HTN (HYPERTENSION), BENIGN: ICD-10-CM

## 2025-09-04 DIAGNOSIS — H10.11 ALLERGIC CONJUNCTIVITIS OF RIGHT EYE: ICD-10-CM

## 2025-09-04 DIAGNOSIS — Z96.1 PSEUDOPHAKIA OF BOTH EYES: ICD-10-CM

## 2025-09-05 DIAGNOSIS — H10.11 ALLERGIC CONJUNCTIVITIS OF RIGHT EYE: Primary | ICD-10-CM

## 2025-09-05 RX ORDER — OLOPATADINE HYDROCHLORIDE 1 MG/ML
1 SOLUTION OPHTHALMIC 2 TIMES DAILY
Qty: 5 ML | Refills: 11 | Status: SHIPPED | OUTPATIENT
Start: 2025-09-05 | End: 2026-09-05

## (undated) DEVICE — SOL IRRI STRL WATER 1000ML

## (undated) DEVICE — KIT SURGICAL COLON .25 1.1OZ

## (undated) DEVICE — Device

## (undated) DEVICE — TRAP ETRAP POLYP 50 TRAY

## (undated) DEVICE — SNARE EXACTO COLD

## (undated) DEVICE — INTRODUCER TRNSRADIAL 5F 10CM

## (undated) DEVICE — COVER CIV-FLEX PROBE US 58IN

## (undated) DEVICE — CATH OPTITORQUE RADIAL 5FR

## (undated) DEVICE — DRSNG POLYSKIN TRNSPAR 4X4.75

## (undated) DEVICE — NDL BLUNT FILL 18G 1IN

## (undated) DEVICE — DRAPE RADIAL BRACHIAL 29X42IN

## (undated) DEVICE — PAD DEFIB CADENCE ADULT R2

## (undated) DEVICE — OMNIPAQUE 350MG 150ML VIAL

## (undated) DEVICE — MANIFOLD 4 PORT

## (undated) DEVICE — MOUTHPIECE ENDO 60FR

## (undated) DEVICE — GUIDEWIRE EMERALD 3MM 175X5CM

## (undated) DEVICE — INTRODUCER TRNSRADIAL 6F 10CM

## (undated) DEVICE — HEMOSTAT VASC BAND REG 24CM

## (undated) DEVICE — FORCEP ALLIGATOR 2.8MM W/NDL